# Patient Record
Sex: MALE | Race: WHITE | Employment: OTHER | URBAN - NONMETROPOLITAN AREA
[De-identification: names, ages, dates, MRNs, and addresses within clinical notes are randomized per-mention and may not be internally consistent; named-entity substitution may affect disease eponyms.]

---

## 2017-01-12 ENCOUNTER — OFFICE VISIT (OUTPATIENT)
Dept: FAMILY MEDICINE CLINIC | Age: 67
End: 2017-01-12

## 2017-01-12 VITALS
SYSTOLIC BLOOD PRESSURE: 134 MMHG | HEART RATE: 68 BPM | WEIGHT: 250 LBS | OXYGEN SATURATION: 95 % | DIASTOLIC BLOOD PRESSURE: 86 MMHG | BODY MASS INDEX: 35.79 KG/M2 | HEIGHT: 70 IN

## 2017-01-12 DIAGNOSIS — G47.33 OSA (OBSTRUCTIVE SLEEP APNEA): ICD-10-CM

## 2017-01-12 DIAGNOSIS — I10 ESSENTIAL HYPERTENSION: ICD-10-CM

## 2017-01-12 DIAGNOSIS — I25.10 ASHD (ARTERIOSCLEROTIC HEART DISEASE): ICD-10-CM

## 2017-01-12 DIAGNOSIS — E78.5 HYPERLIPIDEMIA WITH TARGET LDL LESS THAN 70: ICD-10-CM

## 2017-01-12 DIAGNOSIS — M1A.9XX0 CHRONIC GOUT WITHOUT TOPHUS, UNSPECIFIED CAUSE, UNSPECIFIED SITE: ICD-10-CM

## 2017-01-12 DIAGNOSIS — I48.20 CHRONIC ATRIAL FIBRILLATION (HCC): ICD-10-CM

## 2017-01-12 LAB
INR BLD: 3.06 (ref 0.85–1.16)
PROTHROMBIN TIME: 35.7 SEC (ref 9.8–13)

## 2017-01-12 PROCEDURE — 36415 COLL VENOUS BLD VENIPUNCTURE: CPT | Performed by: FAMILY MEDICINE

## 2017-01-12 PROCEDURE — 99214 OFFICE O/P EST MOD 30 MIN: CPT | Performed by: FAMILY MEDICINE

## 2017-01-12 RX ORDER — FOLIC ACID 1 MG/1
TABLET ORAL
Qty: 30 TABLET | Refills: 5 | Status: SHIPPED | OUTPATIENT
Start: 2017-01-12 | End: 2017-06-16 | Stop reason: SDUPTHER

## 2017-01-12 RX ORDER — BUSPIRONE HYDROCHLORIDE 15 MG/1
TABLET ORAL
Qty: 40 TABLET | Refills: 5 | Status: SHIPPED | OUTPATIENT
Start: 2017-01-12 | End: 2017-06-16 | Stop reason: SDUPTHER

## 2017-01-12 RX ORDER — ROSUVASTATIN CALCIUM 10 MG/1
TABLET, COATED ORAL
Qty: 30 TABLET | Refills: 5 | Status: SHIPPED | OUTPATIENT
Start: 2017-01-12 | End: 2017-06-16 | Stop reason: SDUPTHER

## 2017-01-12 RX ORDER — LISINOPRIL 40 MG/1
40 TABLET ORAL DAILY
Qty: 30 TABLET | Refills: 5 | Status: SHIPPED | OUTPATIENT
Start: 2017-01-12 | End: 2017-06-16 | Stop reason: SDUPTHER

## 2017-01-12 RX ORDER — CLONAZEPAM 2 MG/1
TABLET ORAL
Qty: 30 TABLET | Refills: 5 | Status: SHIPPED | OUTPATIENT
Start: 2017-01-12 | End: 2017-06-16 | Stop reason: SDUPTHER

## 2017-01-12 RX ORDER — EZETIMIBE 10 MG/1
TABLET ORAL
Qty: 30 TABLET | Refills: 5 | Status: SHIPPED | OUTPATIENT
Start: 2017-01-12 | End: 2017-01-23

## 2017-01-12 RX ORDER — METOPROLOL TARTRATE 100 MG/1
100 TABLET ORAL 2 TIMES DAILY
Qty: 60 TABLET | Refills: 5 | Status: SHIPPED | OUTPATIENT
Start: 2017-01-12 | End: 2017-06-16 | Stop reason: SDUPTHER

## 2017-01-12 RX ORDER — LAMOTRIGINE 100 MG/1
TABLET ORAL
Qty: 30 TABLET | Refills: 5 | Status: SHIPPED | OUTPATIENT
Start: 2017-01-12 | End: 2017-06-16 | Stop reason: SDUPTHER

## 2017-01-12 ASSESSMENT — ENCOUNTER SYMPTOMS
APNEA: 0
SHORTNESS OF BREATH: 0
ANAL BLEEDING: 0
BLOOD IN STOOL: 0

## 2017-01-20 ENCOUNTER — TELEPHONE (OUTPATIENT)
Dept: FAMILY MEDICINE CLINIC | Age: 67
End: 2017-01-20

## 2017-02-19 DIAGNOSIS — M1A.9XX0 CHRONIC GOUT WITHOUT TOPHUS, UNSPECIFIED CAUSE, UNSPECIFIED SITE: ICD-10-CM

## 2017-02-20 RX ORDER — ALLOPURINOL 300 MG/1
TABLET ORAL
Qty: 30 TABLET | Refills: 5 | Status: SHIPPED | OUTPATIENT
Start: 2017-02-20 | End: 2017-06-16 | Stop reason: SDUPTHER

## 2017-02-27 ENCOUNTER — OFFICE VISIT (OUTPATIENT)
Dept: DERMATOLOGY | Age: 67
End: 2017-02-27

## 2017-02-27 DIAGNOSIS — L85.3 XEROSIS CUTIS: ICD-10-CM

## 2017-02-27 DIAGNOSIS — Z12.83 SCREENING EXAM FOR SKIN CANCER: ICD-10-CM

## 2017-02-27 DIAGNOSIS — L57.0 ACTINIC KERATOSES: Primary | ICD-10-CM

## 2017-02-27 PROCEDURE — 99203 OFFICE O/P NEW LOW 30 MIN: CPT | Performed by: DERMATOLOGY

## 2017-02-27 PROCEDURE — G8598 ASA/ANTIPLAT THER USED: HCPCS | Performed by: DERMATOLOGY

## 2017-02-27 PROCEDURE — 3017F COLORECTAL CA SCREEN DOC REV: CPT | Performed by: DERMATOLOGY

## 2017-02-27 PROCEDURE — G8417 CALC BMI ABV UP PARAM F/U: HCPCS | Performed by: DERMATOLOGY

## 2017-02-27 PROCEDURE — 4004F PT TOBACCO SCREEN RCVD TLK: CPT | Performed by: DERMATOLOGY

## 2017-02-27 PROCEDURE — G8427 DOCREV CUR MEDS BY ELIG CLIN: HCPCS | Performed by: DERMATOLOGY

## 2017-02-27 PROCEDURE — 1123F ACP DISCUSS/DSCN MKR DOCD: CPT | Performed by: DERMATOLOGY

## 2017-02-27 PROCEDURE — G8484 FLU IMMUNIZE NO ADMIN: HCPCS | Performed by: DERMATOLOGY

## 2017-02-27 PROCEDURE — 4040F PNEUMOC VAC/ADMIN/RCVD: CPT | Performed by: DERMATOLOGY

## 2017-02-27 PROCEDURE — 17000 DESTRUCT PREMALG LESION: CPT | Performed by: DERMATOLOGY

## 2017-02-27 PROCEDURE — 17003 DESTRUCT PREMALG LES 2-14: CPT | Performed by: DERMATOLOGY

## 2017-02-27 RX ORDER — EZETIMIBE 10 MG/1
10 TABLET ORAL DAILY
COMMUNITY
End: 2017-06-16 | Stop reason: SDUPTHER

## 2017-06-16 ENCOUNTER — OFFICE VISIT (OUTPATIENT)
Dept: FAMILY MEDICINE CLINIC | Age: 67
End: 2017-06-16

## 2017-06-16 VITALS
SYSTOLIC BLOOD PRESSURE: 118 MMHG | DIASTOLIC BLOOD PRESSURE: 66 MMHG | OXYGEN SATURATION: 97 % | HEART RATE: 71 BPM | WEIGHT: 250 LBS | HEIGHT: 70 IN | BODY MASS INDEX: 35.79 KG/M2

## 2017-06-16 DIAGNOSIS — E78.5 HYPERLIPIDEMIA WITH TARGET LDL LESS THAN 70: ICD-10-CM

## 2017-06-16 DIAGNOSIS — F41.9 ANXIETY: ICD-10-CM

## 2017-06-16 DIAGNOSIS — I10 ESSENTIAL HYPERTENSION: Primary | ICD-10-CM

## 2017-06-16 DIAGNOSIS — G40.909 SEIZURE DISORDER (HCC): ICD-10-CM

## 2017-06-16 DIAGNOSIS — M1A.9XX0 CHRONIC GOUT WITHOUT TOPHUS, UNSPECIFIED CAUSE, UNSPECIFIED SITE: ICD-10-CM

## 2017-06-16 LAB
A/G RATIO: 1.3 (ref 1.1–2.2)
ALBUMIN SERPL-MCNC: 4.2 G/DL (ref 3.4–5)
ALP BLD-CCNC: 84 U/L (ref 40–129)
ALT SERPL-CCNC: 23 U/L (ref 10–40)
ANION GAP SERPL CALCULATED.3IONS-SCNC: 13 MMOL/L (ref 3–16)
AST SERPL-CCNC: 40 U/L (ref 15–37)
BILIRUB SERPL-MCNC: 1.1 MG/DL (ref 0–1)
BUN BLDV-MCNC: 13 MG/DL (ref 7–20)
CALCIUM SERPL-MCNC: 9.5 MG/DL (ref 8.3–10.6)
CHLORIDE BLD-SCNC: 99 MMOL/L (ref 99–110)
CHOLESTEROL, TOTAL: 128 MG/DL (ref 0–199)
CO2: 29 MMOL/L (ref 21–32)
CREAT SERPL-MCNC: 1 MG/DL (ref 0.8–1.3)
GFR AFRICAN AMERICAN: >60
GFR NON-AFRICAN AMERICAN: >60
GLOBULIN: 3.3 G/DL
GLUCOSE BLD-MCNC: 133 MG/DL (ref 70–99)
HCT VFR BLD CALC: 46.1 % (ref 40.5–52.5)
HDLC SERPL-MCNC: 54 MG/DL (ref 40–60)
HEMOGLOBIN: 14.9 G/DL (ref 13.5–17.5)
LDL CHOLESTEROL CALCULATED: 56 MG/DL
MCH RBC QN AUTO: 30.5 PG (ref 26–34)
MCHC RBC AUTO-ENTMCNC: 32.3 G/DL (ref 31–36)
MCV RBC AUTO: 94.3 FL (ref 80–100)
PDW BLD-RTO: 14.8 % (ref 12.4–15.4)
PLATELET # BLD: 129 K/UL (ref 135–450)
PMV BLD AUTO: 9 FL (ref 5–10.5)
POTASSIUM SERPL-SCNC: 4.2 MMOL/L (ref 3.5–5.1)
RBC # BLD: 4.89 M/UL (ref 4.2–5.9)
SODIUM BLD-SCNC: 141 MMOL/L (ref 136–145)
TOTAL PROTEIN: 7.5 G/DL (ref 6.4–8.2)
TRIGL SERPL-MCNC: 88 MG/DL (ref 0–150)
URIC ACID, SERUM: 3.4 MG/DL (ref 3.5–7.2)
VLDLC SERPL CALC-MCNC: 18 MG/DL
WBC # BLD: 5 K/UL (ref 4–11)

## 2017-06-16 PROCEDURE — 3017F COLORECTAL CA SCREEN DOC REV: CPT | Performed by: FAMILY MEDICINE

## 2017-06-16 PROCEDURE — G8417 CALC BMI ABV UP PARAM F/U: HCPCS | Performed by: FAMILY MEDICINE

## 2017-06-16 PROCEDURE — 4040F PNEUMOC VAC/ADMIN/RCVD: CPT | Performed by: FAMILY MEDICINE

## 2017-06-16 PROCEDURE — 4004F PT TOBACCO SCREEN RCVD TLK: CPT | Performed by: FAMILY MEDICINE

## 2017-06-16 PROCEDURE — 99214 OFFICE O/P EST MOD 30 MIN: CPT | Performed by: FAMILY MEDICINE

## 2017-06-16 PROCEDURE — G8427 DOCREV CUR MEDS BY ELIG CLIN: HCPCS | Performed by: FAMILY MEDICINE

## 2017-06-16 PROCEDURE — 36415 COLL VENOUS BLD VENIPUNCTURE: CPT | Performed by: FAMILY MEDICINE

## 2017-06-16 PROCEDURE — 1123F ACP DISCUSS/DSCN MKR DOCD: CPT | Performed by: FAMILY MEDICINE

## 2017-06-16 PROCEDURE — G8598 ASA/ANTIPLAT THER USED: HCPCS | Performed by: FAMILY MEDICINE

## 2017-06-16 RX ORDER — ALLOPURINOL 300 MG/1
TABLET ORAL
Qty: 30 TABLET | Refills: 5 | Status: SHIPPED | OUTPATIENT
Start: 2017-06-16 | End: 2018-10-04 | Stop reason: SDUPTHER

## 2017-06-16 RX ORDER — LAMOTRIGINE 100 MG/1
TABLET ORAL
Qty: 30 TABLET | Refills: 5 | Status: SHIPPED | OUTPATIENT
Start: 2017-06-16 | End: 2018-01-15 | Stop reason: ALTCHOICE

## 2017-06-16 RX ORDER — ROSUVASTATIN CALCIUM 10 MG/1
TABLET, COATED ORAL
Qty: 30 TABLET | Refills: 5 | Status: SHIPPED | OUTPATIENT
Start: 2017-06-16 | End: 2018-03-19 | Stop reason: SDUPTHER

## 2017-06-16 RX ORDER — FOLIC ACID 1 MG/1
TABLET ORAL
Qty: 30 TABLET | Refills: 5 | Status: SHIPPED | OUTPATIENT
Start: 2017-06-16 | End: 2018-01-17 | Stop reason: SDUPTHER

## 2017-06-16 RX ORDER — METOPROLOL TARTRATE 100 MG/1
100 TABLET ORAL 2 TIMES DAILY
Qty: 60 TABLET | Refills: 5 | Status: SHIPPED | OUTPATIENT
Start: 2017-06-16 | End: 2018-01-23 | Stop reason: SDUPTHER

## 2017-06-16 RX ORDER — CLONAZEPAM 2 MG/1
TABLET ORAL
Qty: 30 TABLET | Refills: 5 | Status: SHIPPED | OUTPATIENT
Start: 2017-06-16 | End: 2017-12-04 | Stop reason: SDUPTHER

## 2017-06-16 RX ORDER — LISINOPRIL 40 MG/1
40 TABLET ORAL DAILY
Qty: 30 TABLET | Refills: 5 | Status: SHIPPED | OUTPATIENT
Start: 2017-06-16 | End: 2017-12-21

## 2017-06-16 RX ORDER — EZETIMIBE 10 MG/1
10 TABLET ORAL DAILY
Qty: 30 TABLET | Refills: 5 | Status: SHIPPED | OUTPATIENT
Start: 2017-06-16 | End: 2018-02-03 | Stop reason: SDUPTHER

## 2017-06-16 RX ORDER — BUSPIRONE HYDROCHLORIDE 15 MG/1
TABLET ORAL
Qty: 40 TABLET | Refills: 5 | Status: SHIPPED | OUTPATIENT
Start: 2017-06-16 | End: 2017-12-21 | Stop reason: SDUPTHER

## 2017-06-16 ASSESSMENT — ENCOUNTER SYMPTOMS
BLOOD IN STOOL: 0
ANAL BLEEDING: 0

## 2017-08-28 ENCOUNTER — OFFICE VISIT (OUTPATIENT)
Dept: FAMILY MEDICINE CLINIC | Age: 67
End: 2017-08-28

## 2017-08-28 VITALS
HEART RATE: 90 BPM | HEIGHT: 70 IN | SYSTOLIC BLOOD PRESSURE: 110 MMHG | OXYGEN SATURATION: 97 % | DIASTOLIC BLOOD PRESSURE: 70 MMHG | BODY MASS INDEX: 35.93 KG/M2 | WEIGHT: 251 LBS

## 2017-08-28 DIAGNOSIS — K92.2 GASTROINTESTINAL HEMORRHAGE, UNSPECIFIED GASTROINTESTINAL HEMORRHAGE TYPE: ICD-10-CM

## 2017-08-28 DIAGNOSIS — I95.1 ORTHOSTASIS: Primary | ICD-10-CM

## 2017-08-28 LAB
A/G RATIO: 1.3 (ref 1.1–2.2)
ALBUMIN SERPL-MCNC: 4 G/DL (ref 3.4–5)
ALP BLD-CCNC: 85 U/L (ref 40–129)
ALT SERPL-CCNC: 22 U/L (ref 10–40)
ANION GAP SERPL CALCULATED.3IONS-SCNC: 10 MMOL/L (ref 3–16)
AST SERPL-CCNC: 39 U/L (ref 15–37)
BASOPHILS ABSOLUTE: 0 K/UL (ref 0–0.2)
BASOPHILS RELATIVE PERCENT: 0.7 %
BILIRUB SERPL-MCNC: 0.6 MG/DL (ref 0–1)
BUN BLDV-MCNC: 26 MG/DL (ref 7–20)
CALCIUM SERPL-MCNC: 9 MG/DL (ref 8.3–10.6)
CHLORIDE BLD-SCNC: 102 MMOL/L (ref 99–110)
CO2: 27 MMOL/L (ref 21–32)
CREAT SERPL-MCNC: 1.3 MG/DL (ref 0.8–1.3)
EOSINOPHILS ABSOLUTE: 0.2 K/UL (ref 0–0.6)
EOSINOPHILS RELATIVE PERCENT: 3.4 %
GFR AFRICAN AMERICAN: >60
GFR NON-AFRICAN AMERICAN: 55
GLOBULIN: 3.2 G/DL
GLUCOSE BLD-MCNC: 93 MG/DL (ref 70–99)
HCT VFR BLD CALC: 34.9 % (ref 40.5–52.5)
HEMOGLOBIN: 11.7 G/DL (ref 13.5–17.5)
INR BLD: 2.61 (ref 0.85–1.15)
LYMPHOCYTES ABSOLUTE: 1.8 K/UL (ref 1–5.1)
LYMPHOCYTES RELATIVE PERCENT: 28 %
MCH RBC QN AUTO: 31.3 PG (ref 26–34)
MCHC RBC AUTO-ENTMCNC: 33.6 G/DL (ref 31–36)
MCV RBC AUTO: 93.3 FL (ref 80–100)
MONOCYTES ABSOLUTE: 1.1 K/UL (ref 0–1.3)
MONOCYTES RELATIVE PERCENT: 16.7 %
NEUTROPHILS ABSOLUTE: 3.3 K/UL (ref 1.7–7.7)
NEUTROPHILS RELATIVE PERCENT: 51.2 %
PDW BLD-RTO: 16.2 % (ref 12.4–15.4)
PLATELET # BLD: 148 K/UL (ref 135–450)
PMV BLD AUTO: 9.9 FL (ref 5–10.5)
POTASSIUM SERPL-SCNC: 5.3 MMOL/L (ref 3.5–5.1)
PROTHROMBIN TIME: 29.5 SEC (ref 9.6–13)
RBC # BLD: 3.75 M/UL (ref 4.2–5.9)
SODIUM BLD-SCNC: 139 MMOL/L (ref 136–145)
TOTAL PROTEIN: 7.2 G/DL (ref 6.4–8.2)
WBC # BLD: 6.4 K/UL (ref 4–11)

## 2017-08-28 PROCEDURE — 3017F COLORECTAL CA SCREEN DOC REV: CPT | Performed by: FAMILY MEDICINE

## 2017-08-28 PROCEDURE — 99215 OFFICE O/P EST HI 40 MIN: CPT | Performed by: FAMILY MEDICINE

## 2017-08-28 PROCEDURE — G8417 CALC BMI ABV UP PARAM F/U: HCPCS | Performed by: FAMILY MEDICINE

## 2017-08-28 PROCEDURE — 36415 COLL VENOUS BLD VENIPUNCTURE: CPT | Performed by: FAMILY MEDICINE

## 2017-08-28 PROCEDURE — G8598 ASA/ANTIPLAT THER USED: HCPCS | Performed by: FAMILY MEDICINE

## 2017-08-28 PROCEDURE — 4040F PNEUMOC VAC/ADMIN/RCVD: CPT | Performed by: FAMILY MEDICINE

## 2017-08-28 PROCEDURE — 1123F ACP DISCUSS/DSCN MKR DOCD: CPT | Performed by: FAMILY MEDICINE

## 2017-08-28 PROCEDURE — 4004F PT TOBACCO SCREEN RCVD TLK: CPT | Performed by: FAMILY MEDICINE

## 2017-08-28 PROCEDURE — G8427 DOCREV CUR MEDS BY ELIG CLIN: HCPCS | Performed by: FAMILY MEDICINE

## 2017-08-28 ASSESSMENT — ENCOUNTER SYMPTOMS
CONSTIPATION: 0
DIARRHEA: 1
RESPIRATORY NEGATIVE: 1
BLOOD IN STOOL: 1
VOMITING: 0

## 2017-08-29 ENCOUNTER — TELEPHONE (OUTPATIENT)
Dept: FAMILY MEDICINE CLINIC | Age: 67
End: 2017-08-29

## 2017-08-29 DIAGNOSIS — K92.2 ACUTE GI BLEEDING: Primary | ICD-10-CM

## 2017-08-31 ENCOUNTER — OFFICE VISIT (OUTPATIENT)
Dept: FAMILY MEDICINE CLINIC | Age: 67
End: 2017-08-31

## 2017-08-31 DIAGNOSIS — I48.20 CHRONIC ATRIAL FIBRILLATION (HCC): ICD-10-CM

## 2017-08-31 DIAGNOSIS — K92.2 ACUTE GI BLEEDING: Primary | ICD-10-CM

## 2017-08-31 DIAGNOSIS — I10 ESSENTIAL HYPERTENSION: ICD-10-CM

## 2017-08-31 PROCEDURE — G8417 CALC BMI ABV UP PARAM F/U: HCPCS | Performed by: FAMILY MEDICINE

## 2017-08-31 PROCEDURE — 1123F ACP DISCUSS/DSCN MKR DOCD: CPT | Performed by: FAMILY MEDICINE

## 2017-08-31 PROCEDURE — 4040F PNEUMOC VAC/ADMIN/RCVD: CPT | Performed by: FAMILY MEDICINE

## 2017-08-31 PROCEDURE — G8598 ASA/ANTIPLAT THER USED: HCPCS | Performed by: FAMILY MEDICINE

## 2017-08-31 PROCEDURE — 99214 OFFICE O/P EST MOD 30 MIN: CPT | Performed by: FAMILY MEDICINE

## 2017-08-31 PROCEDURE — 3017F COLORECTAL CA SCREEN DOC REV: CPT | Performed by: FAMILY MEDICINE

## 2017-08-31 PROCEDURE — 4004F PT TOBACCO SCREEN RCVD TLK: CPT | Performed by: FAMILY MEDICINE

## 2017-08-31 PROCEDURE — G8427 DOCREV CUR MEDS BY ELIG CLIN: HCPCS | Performed by: FAMILY MEDICINE

## 2017-09-01 ENCOUNTER — TELEPHONE (OUTPATIENT)
Dept: FAMILY MEDICINE CLINIC | Age: 67
End: 2017-09-01

## 2017-09-01 VITALS
WEIGHT: 247 LBS | SYSTOLIC BLOOD PRESSURE: 156 MMHG | HEIGHT: 70 IN | HEART RATE: 109 BPM | OXYGEN SATURATION: 97 % | DIASTOLIC BLOOD PRESSURE: 70 MMHG | BODY MASS INDEX: 35.36 KG/M2

## 2017-09-01 ASSESSMENT — ENCOUNTER SYMPTOMS
RESPIRATORY NEGATIVE: 1
BLOOD IN STOOL: 0
ANAL BLEEDING: 0

## 2017-09-06 ENCOUNTER — TELEPHONE (OUTPATIENT)
Dept: FAMILY MEDICINE CLINIC | Age: 67
End: 2017-09-06

## 2017-09-13 ENCOUNTER — OFFICE VISIT (OUTPATIENT)
Dept: FAMILY MEDICINE CLINIC | Age: 67
End: 2017-09-13

## 2017-09-13 VITALS
TEMPERATURE: 99 F | DIASTOLIC BLOOD PRESSURE: 86 MMHG | WEIGHT: 249 LBS | HEART RATE: 82 BPM | SYSTOLIC BLOOD PRESSURE: 126 MMHG | OXYGEN SATURATION: 96 % | HEIGHT: 70 IN | BODY MASS INDEX: 35.65 KG/M2

## 2017-09-13 DIAGNOSIS — D50.0 IRON DEFICIENCY ANEMIA DUE TO CHRONIC BLOOD LOSS: Primary | ICD-10-CM

## 2017-09-13 DIAGNOSIS — Z23 NEED FOR INFLUENZA VACCINATION: ICD-10-CM

## 2017-09-13 LAB
HCT VFR BLD CALC: 34.2 % (ref 40.5–52.5)
HEMOGLOBIN: 11.2 G/DL (ref 13.5–17.5)
MCH RBC QN AUTO: 29.8 PG (ref 26–34)
MCHC RBC AUTO-ENTMCNC: 32.9 G/DL (ref 31–36)
MCV RBC AUTO: 90.6 FL (ref 80–100)
PDW BLD-RTO: 15.7 % (ref 12.4–15.4)
PLATELET # BLD: 132 K/UL (ref 135–450)
PMV BLD AUTO: 8.8 FL (ref 5–10.5)
RBC # BLD: 3.78 M/UL (ref 4.2–5.9)
WBC # BLD: 5.9 K/UL (ref 4–11)

## 2017-09-13 PROCEDURE — 4040F PNEUMOC VAC/ADMIN/RCVD: CPT | Performed by: FAMILY MEDICINE

## 2017-09-13 PROCEDURE — 99214 OFFICE O/P EST MOD 30 MIN: CPT | Performed by: FAMILY MEDICINE

## 2017-09-13 PROCEDURE — 3017F COLORECTAL CA SCREEN DOC REV: CPT | Performed by: FAMILY MEDICINE

## 2017-09-13 PROCEDURE — 1123F ACP DISCUSS/DSCN MKR DOCD: CPT | Performed by: FAMILY MEDICINE

## 2017-09-13 PROCEDURE — G0008 ADMIN INFLUENZA VIRUS VAC: HCPCS | Performed by: FAMILY MEDICINE

## 2017-09-13 PROCEDURE — G8598 ASA/ANTIPLAT THER USED: HCPCS | Performed by: FAMILY MEDICINE

## 2017-09-13 PROCEDURE — 36415 COLL VENOUS BLD VENIPUNCTURE: CPT | Performed by: FAMILY MEDICINE

## 2017-09-13 PROCEDURE — G8427 DOCREV CUR MEDS BY ELIG CLIN: HCPCS | Performed by: FAMILY MEDICINE

## 2017-09-13 PROCEDURE — 4004F PT TOBACCO SCREEN RCVD TLK: CPT | Performed by: FAMILY MEDICINE

## 2017-09-13 PROCEDURE — 90688 IIV4 VACCINE SPLT 0.5 ML IM: CPT | Performed by: FAMILY MEDICINE

## 2017-09-13 PROCEDURE — G8417 CALC BMI ABV UP PARAM F/U: HCPCS | Performed by: FAMILY MEDICINE

## 2017-09-13 RX ORDER — DABIGATRAN ETEXILATE 150 MG/1
150 CAPSULE, COATED PELLETS ORAL 2 TIMES DAILY
COMMUNITY
End: 2017-10-18 | Stop reason: SINTOL

## 2017-09-13 ASSESSMENT — ENCOUNTER SYMPTOMS
BLOOD IN STOOL: 0
SHORTNESS OF BREATH: 0
ANAL BLEEDING: 0

## 2017-10-18 ENCOUNTER — OFFICE VISIT (OUTPATIENT)
Dept: FAMILY MEDICINE CLINIC | Age: 67
End: 2017-10-18

## 2017-10-18 VITALS
HEART RATE: 84 BPM | SYSTOLIC BLOOD PRESSURE: 122 MMHG | BODY MASS INDEX: 36.36 KG/M2 | HEIGHT: 70 IN | DIASTOLIC BLOOD PRESSURE: 78 MMHG | OXYGEN SATURATION: 97 % | WEIGHT: 254 LBS

## 2017-10-18 DIAGNOSIS — D50.8 OTHER IRON DEFICIENCY ANEMIA: Primary | ICD-10-CM

## 2017-10-18 LAB
HCT VFR BLD CALC: 34.8 % (ref 40.5–52.5)
HEMOGLOBIN: 11.3 G/DL (ref 13.5–17.5)
MCH RBC QN AUTO: 27.9 PG (ref 26–34)
MCHC RBC AUTO-ENTMCNC: 32.3 G/DL (ref 31–36)
MCV RBC AUTO: 86.4 FL (ref 80–100)
PDW BLD-RTO: 18.2 % (ref 12.4–15.4)
PLATELET # BLD: 114 K/UL (ref 135–450)
PMV BLD AUTO: 9.2 FL (ref 5–10.5)
RBC # BLD: 4.03 M/UL (ref 4.2–5.9)
WBC # BLD: 4.9 K/UL (ref 4–11)

## 2017-10-18 PROCEDURE — 1123F ACP DISCUSS/DSCN MKR DOCD: CPT | Performed by: FAMILY MEDICINE

## 2017-10-18 PROCEDURE — 4040F PNEUMOC VAC/ADMIN/RCVD: CPT | Performed by: FAMILY MEDICINE

## 2017-10-18 PROCEDURE — G8427 DOCREV CUR MEDS BY ELIG CLIN: HCPCS | Performed by: FAMILY MEDICINE

## 2017-10-18 PROCEDURE — 36415 COLL VENOUS BLD VENIPUNCTURE: CPT | Performed by: FAMILY MEDICINE

## 2017-10-18 PROCEDURE — 99213 OFFICE O/P EST LOW 20 MIN: CPT | Performed by: FAMILY MEDICINE

## 2017-10-18 PROCEDURE — G8417 CALC BMI ABV UP PARAM F/U: HCPCS | Performed by: FAMILY MEDICINE

## 2017-10-18 PROCEDURE — 3017F COLORECTAL CA SCREEN DOC REV: CPT | Performed by: FAMILY MEDICINE

## 2017-10-18 PROCEDURE — G8484 FLU IMMUNIZE NO ADMIN: HCPCS | Performed by: FAMILY MEDICINE

## 2017-10-18 PROCEDURE — 4004F PT TOBACCO SCREEN RCVD TLK: CPT | Performed by: FAMILY MEDICINE

## 2017-10-18 PROCEDURE — G8598 ASA/ANTIPLAT THER USED: HCPCS | Performed by: FAMILY MEDICINE

## 2017-10-18 RX ORDER — ALBUTEROL SULFATE 90 UG/1
2 AEROSOL, METERED RESPIRATORY (INHALATION) EVERY 6 HOURS PRN
COMMUNITY
End: 2017-10-18 | Stop reason: SDUPTHER

## 2017-10-18 RX ORDER — ALBUTEROL SULFATE 90 UG/1
2 AEROSOL, METERED RESPIRATORY (INHALATION) EVERY 6 HOURS PRN
Qty: 1 INHALER | Refills: 5 | Status: SHIPPED | OUTPATIENT
Start: 2017-10-18 | End: 2018-11-23 | Stop reason: SDUPTHER

## 2017-10-18 NOTE — PROGRESS NOTES
Subjective:      Patient ID: Karlene Mixon is a 79 y.o. male. HPI  Chief Complaint   Patient presents with    Anemia     1 mo fu     Dizziness     states he has been having these again they started about 4 days ago ;less lightheadedness;    Shortness of Breath     whenever he does anything physical this happens ;spouse feels he is less sob; Chief complaint present illness: Patient presents in follow-up regarding anemia. On no iron. No black stool. Wife is here with him and corroborates. Not exactly clear to this examiner whether he is any worse or any better or whether he is return to baseline status before his GI bleed. Cardiologist is said not a candidate currently for watchman procedure. This examiner not completely in agreement with this given the recent rather large bleed. Review of Systems  neg  Objective:   Physical Exam  /78   Pulse 84   Ht 5' 10\" (1.778 m)   Wt 254 lb (115.2 kg)   SpO2 97%   BMI 36.45 kg/m²   Skin: No pallor  Assessment:      Opal Phillips was seen today for anemia, dizziness and shortness of breath. Diagnoses and all orders for this visit:    Other iron deficiency anemia  -     CBC    Other orders  -     albuterol sulfate HFA (PROAIR HFA) 108 (90 Base) MCG/ACT inhaler; Inhale 2 puffs into the lungs every 6 hours as needed for Wheezing           Plan:      No Follow-up on file. Patient Instructions   Will call with blood count. may need iron.

## 2017-10-19 DIAGNOSIS — N91.2 AMENIA: Primary | ICD-10-CM

## 2017-10-25 ENCOUNTER — NURSE ONLY (OUTPATIENT)
Dept: FAMILY MEDICINE CLINIC | Age: 67
End: 2017-10-25

## 2017-10-25 DIAGNOSIS — N91.2 AMENIA: ICD-10-CM

## 2017-10-25 LAB
FOLATE: >20 NG/ML (ref 4.78–24.2)
IRON SATURATION: 7 % (ref 20–50)
IRON: 31 UG/DL (ref 59–158)
TOTAL IRON BINDING CAPACITY: 414 UG/DL (ref 260–445)
VITAMIN B-12: 250 PG/ML (ref 211–911)

## 2017-10-25 PROCEDURE — 36415 COLL VENOUS BLD VENIPUNCTURE: CPT | Performed by: FAMILY MEDICINE

## 2017-10-25 RX ORDER — FERROUS SULFATE 325(65) MG
325 TABLET ORAL 2 TIMES DAILY
Qty: 60 TABLET | Refills: 2 | Status: SHIPPED | OUTPATIENT
Start: 2017-10-25 | End: 2018-01-29 | Stop reason: SDUPTHER

## 2017-10-25 NOTE — PROGRESS NOTES
Lab preformed in R arm and sent number of tubes below to be processed. 1 attempt made and stopped bleeding by applying band aide and guaze.      2 Red    0 purple    0 blue    0 Urine

## 2017-10-26 LAB
KAPPA, FREE LIGHT CHAINS, SERUM: 42.22 MG/L (ref 3.3–19.4)
KAPPA/LAMBDA RATIO: 1.88 (ref 0.26–1.65)
KAPPA/LAMBDA TEST COMMENT: ABNORMAL
LAMBDA, FREE LIGHT CHAINS, SERUM: 22.48 MG/L (ref 5.71–26.3)

## 2017-10-27 LAB
ALBUMIN SERPL-MCNC: 3.5 G/DL (ref 3.1–4.9)
ALPHA-1-GLOBULIN: 0.2 G/DL (ref 0.2–0.4)
ALPHA-2-GLOBULIN: 0.7 G/DL (ref 0.4–1.1)
BETA GLOBULIN: 1.1 G/DL (ref 0.9–1.6)
GAMMA GLOBULIN: 1.5 G/DL (ref 0.6–1.8)
SPE/IFE INTERPRETATION: NORMAL
TOTAL PROTEIN: 6.9 G/DL (ref 6.4–8.2)

## 2017-10-31 DIAGNOSIS — D50.8 OTHER IRON DEFICIENCY ANEMIA: Primary | ICD-10-CM

## 2017-10-31 PROBLEM — E78.5 HYPERLIPIDEMIA: Status: ACTIVE | Noted: 2017-10-31

## 2017-10-31 PROBLEM — I48.92 ATRIAL FLUTTER (HCC): Status: ACTIVE | Noted: 2017-10-05

## 2017-10-31 PROBLEM — I25.10 CORONARY ARTERY DISEASE: Status: ACTIVE | Noted: 2017-10-31

## 2017-10-31 PROBLEM — K21.9 GERD (GASTROESOPHAGEAL REFLUX DISEASE): Status: ACTIVE | Noted: 2017-10-31

## 2017-10-31 PROBLEM — J44.9 COPD (CHRONIC OBSTRUCTIVE PULMONARY DISEASE) (HCC): Status: ACTIVE | Noted: 2017-10-31

## 2017-10-31 PROBLEM — Z95.1 S/P CABG (CORONARY ARTERY BYPASS GRAFT): Status: ACTIVE | Noted: 2017-10-05

## 2017-10-31 PROBLEM — Z72.0 TOBACCO ABUSE: Status: ACTIVE | Noted: 2017-10-31

## 2017-10-31 RX ORDER — LANOLIN ALCOHOL/MO/W.PET/CERES
1000 CREAM (GRAM) TOPICAL DAILY
Qty: 90 TABLET | Refills: 1
Start: 2017-10-31

## 2017-11-17 ENCOUNTER — HOSPITAL ENCOUNTER (OUTPATIENT)
Dept: OTHER | Age: 67
Discharge: HOME | End: 2017-11-17
Attending: FAMILY MEDICINE | Admitting: FAMILY MEDICINE

## 2017-11-17 ENCOUNTER — OFFICE VISIT (OUTPATIENT)
Dept: FAMILY MEDICINE CLINIC | Age: 67
End: 2017-11-17

## 2017-11-17 DIAGNOSIS — I50.9 ACUTE CONGESTIVE HEART FAILURE, UNSPECIFIED CONGESTIVE HEART FAILURE TYPE: ICD-10-CM

## 2017-11-17 DIAGNOSIS — R06.01 ORTHOPNEA: ICD-10-CM

## 2017-11-17 DIAGNOSIS — I50.9 ACUTE CONGESTIVE HEART FAILURE, UNSPECIFIED CONGESTIVE HEART FAILURE TYPE: Primary | ICD-10-CM

## 2017-11-17 DIAGNOSIS — D50.8 OTHER IRON DEFICIENCY ANEMIA: ICD-10-CM

## 2017-11-17 DIAGNOSIS — J43.8 OTHER EMPHYSEMA (HCC): ICD-10-CM

## 2017-11-17 LAB
ALBUMIN SERPL-MCNC: 3.9 G/DL (ref 3.4–5)
ANION GAP SERPL CALCULATED.3IONS-SCNC: 13 MMOL/L (ref 3–16)
BUN BLDV-MCNC: 12 MG/DL (ref 7–20)
CALCIUM SERPL-MCNC: 9 MG/DL (ref 8.3–10.6)
CHLORIDE BLD-SCNC: 101 MMOL/L (ref 99–110)
CO2: 30 MMOL/L (ref 21–32)
CREAT SERPL-MCNC: 1.1 MG/DL (ref 0.8–1.3)
GFR AFRICAN AMERICAN: >60
GFR NON-AFRICAN AMERICAN: >60
GLUCOSE BLD-MCNC: 109 MG/DL (ref 70–99)
HCT VFR BLD CALC: 38.4 % (ref 40.5–52.5)
HEMOGLOBIN: 12.3 G/DL (ref 13.5–17.5)
MCH RBC QN AUTO: 28.1 PG (ref 26–34)
MCHC RBC AUTO-ENTMCNC: 32 G/DL (ref 31–36)
MCV RBC AUTO: 87.8 FL (ref 80–100)
PDW BLD-RTO: 23.6 % (ref 12.4–15.4)
PHOSPHORUS: 3.6 MG/DL (ref 2.5–4.9)
PLATELET # BLD: 118 K/UL (ref 135–450)
PLATELET SLIDE REVIEW: ABNORMAL
PMV BLD AUTO: 7.9 FL (ref 5–10.5)
POTASSIUM SERPL-SCNC: 4.4 MMOL/L (ref 3.5–5.1)
PRO-BNP: 512 PG/ML (ref 0–124)
RBC # BLD: 4.37 M/UL (ref 4.2–5.9)
SLIDE REVIEW: ABNORMAL
SODIUM BLD-SCNC: 144 MMOL/L (ref 136–145)
WBC # BLD: 6.3 K/UL (ref 4–11)

## 2017-11-17 PROCEDURE — G8926 SPIRO NO PERF OR DOC: HCPCS | Performed by: FAMILY MEDICINE

## 2017-11-17 PROCEDURE — G8417 CALC BMI ABV UP PARAM F/U: HCPCS | Performed by: FAMILY MEDICINE

## 2017-11-17 PROCEDURE — 1123F ACP DISCUSS/DSCN MKR DOCD: CPT | Performed by: FAMILY MEDICINE

## 2017-11-17 PROCEDURE — 3017F COLORECTAL CA SCREEN DOC REV: CPT | Performed by: FAMILY MEDICINE

## 2017-11-17 PROCEDURE — 99215 OFFICE O/P EST HI 40 MIN: CPT | Performed by: FAMILY MEDICINE

## 2017-11-17 PROCEDURE — 4040F PNEUMOC VAC/ADMIN/RCVD: CPT | Performed by: FAMILY MEDICINE

## 2017-11-17 PROCEDURE — G8484 FLU IMMUNIZE NO ADMIN: HCPCS | Performed by: FAMILY MEDICINE

## 2017-11-17 PROCEDURE — 3023F SPIROM DOC REV: CPT | Performed by: FAMILY MEDICINE

## 2017-11-17 PROCEDURE — G8598 ASA/ANTIPLAT THER USED: HCPCS | Performed by: FAMILY MEDICINE

## 2017-11-17 PROCEDURE — G8427 DOCREV CUR MEDS BY ELIG CLIN: HCPCS | Performed by: FAMILY MEDICINE

## 2017-11-17 PROCEDURE — 4004F PT TOBACCO SCREEN RCVD TLK: CPT | Performed by: FAMILY MEDICINE

## 2017-11-17 RX ORDER — FUROSEMIDE 20 MG/1
20 TABLET ORAL DAILY
Qty: 10 TABLET | Refills: 0 | Status: SHIPPED | OUTPATIENT
Start: 2017-11-17 | End: 2017-11-17 | Stop reason: SDUPTHER

## 2017-11-17 RX ORDER — FUROSEMIDE 20 MG/1
20 TABLET ORAL DAILY
Qty: 10 TABLET | Refills: 0 | Status: ON HOLD | OUTPATIENT
Start: 2017-11-17 | End: 2017-11-21 | Stop reason: HOSPADM

## 2017-11-17 RX ORDER — FUROSEMIDE 20 MG/1
20 TABLET ORAL DAILY
Qty: 10 TABLET | Refills: 0 | Status: ON HOLD | OUTPATIENT
Start: 2017-11-17 | End: 2017-11-21

## 2017-11-17 RX ORDER — FUROSEMIDE 20 MG/1
20 TABLET ORAL DAILY
COMMUNITY
End: 2017-11-17 | Stop reason: SDUPTHER

## 2017-11-17 NOTE — PROGRESS NOTES
Subjective:      Patient ID: Jesús Kirkland is a 79 y.o. male. HPI  Chief Complaint   Patient presents with    Cough     going on since Monday X 4 days    Shortness of Breath    COPD     hisotry of copd    Anemia     on fe bid;     Chief complaint and present illness: 59-year-old white male with chronic atrial fib on a BX of an with sudden onset of iron deficiency anemia presumed secondary to an undocumented GI bleed presents with a 4-5 day history of difficulty sleeping because when he lies down he feels like he can't breathe, has a coughing spasm of nonproductively and has to sit up in a recliner in order to sleep. If he lies down again, he then gets short of breath. There is some suggestion that he has had this before but it is much worse than usual.  Patient is on lisinopril. Patient denies any chest pain. He is never aware of palpitations though he is in atrial fibrillation. He does complain of dyspnea on exertion but he says it's not new. Has not noticed much in the way of dependent edema. Denies reflux symptoms. Denies sinus congestion with postnasal drip. Review of Systems   Constitutional: Positive for activity change. Negative for fatigue and fever. HENT: Negative. Negative for nosebleeds. Respiratory: Positive for cough and shortness of breath. Cardiovascular: Negative for chest pain, palpitations and leg swelling. Positive paroxysmal nocturnal dyspnea   Gastrointestinal: Negative. Negative for abdominal pain, anal bleeding and blood in stool (stool is dark because he is on iron but it is not bloody he says). Continues to drink to excess   Endocrine: Negative for polydipsia and polyuria. Genitourinary: Negative for hematuria. Hematological: Bruises/bleeds easily. Objective:   Physical Exam   Constitutional: He appears well-developed and well-nourished. No distress. HENT:   Head: Normocephalic.    Mouth/Throat: Oropharynx is clear and moist.   Eyes: Conjunctivae are normal. No scleral icterus. Neck: Neck supple. Hepatojugular reflux and JVD present. Carotid bruit is not present. No thyromegaly present. Cardiovascular: An irregularly irregular rhythm present. Tachycardia present. rate is roughly 100 apically; fairly regular but atrial fib   Pulmonary/Chest: Effort normal. He has no wheezes. He has no rales. Diminished breath sounds in the right base   Abdominal: Soft. There is hepatosplenomegaly and hepatomegaly (liver down about 2 finger breaths). There is no tenderness. Musculoskeletal: He exhibits edema (1-2+ pretibial edema). Lymphadenopathy:     He has no cervical adenopathy. Skin: Skin is warm. No bruising and no ecchymosis noted. No erythema. No pallor. Nursing note and vitals reviewed. BP (!) 98/50   Pulse 71   Temp 97.6 °F (36.4 °C) (Oral)   Ht 5' 10\" (1.778 m)   Wt 254 lb 12.8 oz (115.6 kg)   SpO2 95%   BMI 36.56 kg/m²     Assessment:      Candice Paget was seen today for cough, shortness of breath, copd and anemia. Diagnoses and all orders for this visit:    Acute congestive heart failure, unspecified congestive heart failure type ([facility] [address]  -     BRAIN NATRIURETIC PEPTIDE (BNP); Future  -     XR CHEST STANDARD (2 VW); Future  -     Renal Function Panel; Future    Other iron deficiency anemia  -     CBC; Future    Orthopnea    Other orders  -     apixaban (ELIQUIS) 5 MG TABS tablet; Take 1 tablet by mouth 2 times daily Lot [id number]  EXPIRES 1/2020  -     Discontinue: furosemide (LASIX) 20 MG tablet; Take 1 tablet by mouth daily Once a day for 10 days. Make an appointment next week           Plan:      Patient Instructions   You may need a diuretic   Return if symptoms worsen or fail to improve. Chest x-ray and blood work obtained from [facility]. O Rabquestionable pulmonary edema, BNP slightly elevated in the 500s. Anemia actually better.   Patient and wife called instructions given re: Lasix and follow-up appointment

## 2017-11-19 VITALS
HEART RATE: 71 BPM | OXYGEN SATURATION: 95 % | SYSTOLIC BLOOD PRESSURE: 98 MMHG | TEMPERATURE: 97.6 F | BODY MASS INDEX: 36.48 KG/M2 | DIASTOLIC BLOOD PRESSURE: 50 MMHG | WEIGHT: 254.8 LBS | HEIGHT: 70 IN

## 2017-11-19 ASSESSMENT — ENCOUNTER SYMPTOMS
COUGH: 1
SHORTNESS OF BREATH: 1
BLOOD IN STOOL: 0
GASTROINTESTINAL NEGATIVE: 1
ABDOMINAL PAIN: 0
ANAL BLEEDING: 0

## 2017-11-20 PROBLEM — Z87.891 FORMER SMOKER: Chronic | Status: ACTIVE | Noted: 2017-10-31

## 2017-11-20 PROBLEM — I48.92 ATRIAL FLUTTER (HCC): Status: RESOLVED | Noted: 2017-10-05 | Resolved: 2017-11-20

## 2017-11-20 PROBLEM — I25.10 CORONARY ARTERY DISEASE: Status: RESOLVED | Noted: 2017-10-31 | Resolved: 2017-11-20

## 2017-11-20 PROBLEM — R79.89 ELEVATED TROPONIN: Status: ACTIVE | Noted: 2017-11-20

## 2017-11-20 PROBLEM — E66.9 OBESITY (BMI 30-39.9): Chronic | Status: ACTIVE | Noted: 2017-11-20

## 2017-11-20 PROBLEM — J18.9 LLL PNEUMONIA: Status: ACTIVE | Noted: 2017-11-20

## 2017-11-20 PROBLEM — J44.1 COPD EXACERBATION (HCC): Chronic | Status: ACTIVE | Noted: 2017-10-31

## 2017-11-20 PROBLEM — K21.9 GERD (GASTROESOPHAGEAL REFLUX DISEASE): Chronic | Status: ACTIVE | Noted: 2017-10-31

## 2017-11-20 PROBLEM — F10.20 ALCOHOLISM (HCC): Chronic | Status: ACTIVE | Noted: 2017-11-20

## 2017-11-20 PROBLEM — Z95.1 S/P CABG (CORONARY ARTERY BYPASS GRAFT): Status: RESOLVED | Noted: 2017-10-05 | Resolved: 2017-11-20

## 2017-11-20 PROBLEM — E87.20 LACTIC ACIDOSIS: Status: ACTIVE | Noted: 2017-11-20

## 2017-11-20 PROBLEM — E78.5 HLD (HYPERLIPIDEMIA): Chronic | Status: ACTIVE | Noted: 2017-10-31

## 2017-11-20 PROBLEM — E83.42 HYPOMAGNESEMIA: Status: ACTIVE | Noted: 2017-11-20

## 2017-11-20 PROBLEM — Z79.01 ANTICOAGULATED: Chronic | Status: ACTIVE | Noted: 2017-11-20

## 2017-11-20 PROBLEM — J96.01 ACUTE RESPIRATORY FAILURE WITH HYPOXEMIA (HCC): Status: ACTIVE | Noted: 2017-11-20

## 2017-11-20 PROBLEM — F10.929 ACUTE ALCOHOL INTOXICATION (HCC): Status: ACTIVE | Noted: 2017-11-20

## 2017-11-20 PROBLEM — R77.8 ELEVATED TROPONIN: Status: ACTIVE | Noted: 2017-11-20

## 2017-11-20 PROBLEM — J44.9 COPD (CHRONIC OBSTRUCTIVE PULMONARY DISEASE) (HCC): Chronic | Status: ACTIVE | Noted: 2017-10-31

## 2017-11-21 ENCOUNTER — TELEPHONE (OUTPATIENT)
Dept: PULMONOLOGY | Age: 67
End: 2017-11-21

## 2017-11-21 DIAGNOSIS — J44.9 CHRONIC OBSTRUCTIVE PULMONARY DISEASE, UNSPECIFIED COPD TYPE (HCC): Primary | ICD-10-CM

## 2017-11-21 DIAGNOSIS — R06.02 SOB (SHORTNESS OF BREATH): ICD-10-CM

## 2017-11-22 ENCOUNTER — TELEPHONE (OUTPATIENT)
Dept: PHARMACY | Facility: CLINIC | Age: 67
End: 2017-11-22

## 2017-11-22 ENCOUNTER — CARE COORDINATION (OUTPATIENT)
Dept: CASE MANAGEMENT | Age: 67
End: 2017-11-22

## 2017-11-22 RX ORDER — SOLIFENACIN SUCCINATE 10 MG/1
10 TABLET, FILM COATED ORAL DAILY
COMMUNITY
End: 2019-08-20 | Stop reason: ALTCHOICE

## 2017-11-22 NOTE — TELEPHONE ENCOUNTER
Spoke with patient states would like office to call wife called # provided #[phone] was wrong # will try calling patient back on another day.

## 2017-11-22 NOTE — CARE COORDINATION
[facility] Transitions Initial Follow Up Call    Call within 2 business days of discharge: Yes    Patient: Rosie Mixon Patient : 1950   MRN: [id number]  Reason for Admission: COPD, resp fail, CHF  Discharge Date: 17 RARS: [facility] Risk Score: 14.75     Spoke with: Isi Alanis (patient's wife)    Facility: [facility]    Non-face-to-face services provided:  Obtained and reviewed discharge summary and/or continuity of care documents  Education of patient/family/caregiver/guardian to support self-management-CHF ed  Assessment and support for treatment adherence and medication management-Duonebs      Care Transitions 24 Hour Call    Schedule Follow Up Appointment with PCP:  Completed  Do you have any ongoing symptoms?:  No  Do you have a copy of your discharge instructions?:  Yes  Do you have all of your prescriptions and are they filled?:  No (Comment: did not get Duonebs yet but has requested add'l documentation needed for rx to be paid by [facility] from hospitalist and plans to get today and start)  Have you been contacted by a [facility] Pharmacist?:  No  Have you scheduled your follow up appointment?:  Yes  How are you going to get to your appointment?:  Car - drive self (Comment: 17 with PCP)  Were you discharged with any Home Care or Post Acute Services:  No  Patient DME:  Straight cane  Patient Home Equipment:  CPAP (Comment: doesn't use)  Do you have support at home?:  Partner/Spouse/SO  Do you feel like you have everything you need to keep you well at home?:  Yes  Are you an active caregiver in your home?:  No  Care Transitions Interventions  No Identified Needs       Unable to reach patient by phone. Message left stating purpose of call with contact information requesting return call. Called and spoke with wife on her mobile, she was at work and very brief. Reports patient had good night sleep.  Unable to get duonebs because additional documentation from prescriber was needed for Medicare to pay, but after several calls this morning she was able to get that information and plans to  after work today and have him start right away. States he knows how to use the nebulizer. She called PCP office and they told her 12/4 for follow up was okay. Educated her on same day appts if patient is feeling unwell at any time prior to that appt. Declines HC again. Very brief on phone since she was at work. Agreeable to f/up next week.      Future Appointments  Date Time Provider Talia Berry   12/4/2017 8:15 AM MD MARJ Meza [facility]   3/7/2018 8:00 AM MD MARJ Meza [facility]   3/12/2018 9:15 AM Ryan Coulter MD And Derm TEVIN Ramirez RN

## 2017-11-22 NOTE — TELEPHONE ENCOUNTER
CLINICAL PHARMACY NOTE  Post-Discharge Transitions of Care (MAGDI)    Non-face-to-face services provided:  Assessment and support for treatment adherence and medication management-medication reconciliation    - There are no outstanding medication issues at this time. Subjective/Objective:  Mason Madison is a 79 y.o. male. Patient was discharged from [facility] on 11/21/17 with a diagnosis of respiratory failure. Patient outreach to review discharge medications and provide medication review and management. Spoke with patient    Allergies   Allergen Reactions    Penicillins Hives    Sulfa Antibiotics Hives    Tetanus Toxoids Hives       Discharge Medications (as per discharging medication list found): There are NEW medications for you. START taking them after you leave the hospital   ipratropium-albuterol (DUONEB) 0.5-2.5 (3) MG/3ML SOLN nebulizer solution  · Pt will  today - received call from [employer] that it is ready. Pt confirms that he was provided a neb machine prior to discharge. Inhale 3 mLs into the lungs every 4 hours      predniSONE (DELTASONE) 10 MG tablet  · Taking, no issues to report. 4 tabs for 3 days 3 tabs for 3 days 2 tabs for 3 days 1 tabs for 3 days      levofloxacin (LEVAQUIN) 500 MG tablet  · Taking, no issues to report. Dose appropriate. Take 1 tablet by mouth daily for 7 days     You told us you were taking these medications at home, but the amount or how often you take this medication has CHANGED   solifenacin (VESICARE) 10 MG tablet  · Pt taking 10 mg daily PTA - no change in dose. Take 10 mg by mouth daily      furosemide (LASIX) 40 MG tablet  · Dose increased from 20 mg daily to 40 mg daily. Take 1 tablet by mouth daily One day for 10 days schedule an appointment for next week with Dr Nick Healy.      These are medications you told us you were taking at home, CONTINUE taking them after you leave the hospital   Medication Sig    vitamin B-12 (CYANOCOBALAMIN) 1000 MCG tablet Take 1 tablet by mouth daily    ferrous sulfate (JEFFERSON-NICHO) 325 (65 Fe) MG tablet Take 1 tablet by mouth 2 times daily    apixaban (ELIQUIS) 5 MG TABS tablet Take 5 mg by mouth 2 times daily    albuterol sulfate HFA (PROAIR HFA) 108 (90 Base) MCG/ACT inhaler Inhale 2 puffs into the lungs every 6 hours as needed for Wheezing    allopurinol (ZYLOPRIM) 300 MG tablet TAKE ONE TABLET BY MOUTH ONCE DAILY    busPIRone (BUSPAR) 15 MG tablet TAKE TWO-THIRDS TABLET BY MOUTH TWICE DAILY    clonazePAM (KLONOPIN) 2 MG tablet TAKE ONE TABLET BY MOUTH AT BEDTIME AS NEEDED FOR ANXIETY    ezetimibe (ZETIA) 10 MG tablet Take 1 tablet by mouth daily    folic acid (FOLVITE) 1 MG tablet TAKE ONE TABLET BY MOUTH ONCE DAILY    lamoTRIgine (LAMICTAL) 100 MG tablet TAKE ONE TABLET BY MOUTH ONCE DAILY    lisinopril (PRINIVIL;ZESTRIL) 40 MG tablet Take 1 tablet by mouth daily    metoprolol (LOPRESSOR) 100 MG tablet Take 1 tablet by mouth 2 times daily    rosuvastatin (CRESTOR) 10 MG tablet TAKE ONE TABLET BY MOUTH ONCE DAILY    omeprazole (PRILOSEC OTC) 20 MG tablet Take 20 mg by mouth daily. Meds marked as reviewed. Meds to Beds:No    Estimated Creatinine Clearance: 83 mL/min (based on SCr of 1.1 mg/dL). Assessment/Plan:  - Medication reconciliation completed. Number of medications reviewed: 23    - Pt is taking medications as directed by discharging physician. Number of discrepancies: 1. Instructions per discharge list provided except per below documentation. Identified medication discrepancies/issues:   · Category 1 (0)  · Category 2 (0)  · Category 3 (0)  · Category 4 (1):  1. Vesicare - patient states that he is prescribed and taking 10 mg daily. Confirmed with claims data. Updated home medication list to reflect the correct dose he is taking at home.      - [facility] active medication list updated:  · Medications Added (0)  · Medications Removed (0)  · Medications Changed (1): Vesicare    - Identified Potential Medication Interactions: No clinically significant interactions identified via Lexicomp Interaction Analysis as category D or higher.    - Renal Dosing: No renal adjustments necessary.    - Follow up appointment date (7 days for more severe illness, 14 days for others):    · Patient reminded of upcoming appointment with PCP on 12/4/17. Encouraged patient to call and see if this appointment could get moved up.      Thank you,    Jose Arthur, PharmD  [address]  Phone: [phone]    CLINICAL PHARMACY NOTE   POST-DISCHARGE TELEPHONE FOLLOW-UP ADDENDUM    For Pharmacy Admin Tracking Only    TCM Call Made?: Yes  [facility] Patient?: Yes  Total # of Interventions Recommended: 1 -   - Updated Order #: 1  Total # Interventions Accepted: 1  Intervention Severity:   - Level 1 Intervention Present?: No   - Level 2 #: 0   - Level 3 #: 0  Outreach Status: Review Complete  Care Coordinator Outreach to Patient?: No  Provider Contacted?: No  Time Spent (min): 20     Additional Documentation:

## 2017-11-28 ENCOUNTER — CARE COORDINATION (OUTPATIENT)
Dept: CASE MANAGEMENT | Age: 67
End: 2017-11-28

## 2017-11-28 NOTE — CARE COORDINATION
[facility] Transitions Follow Up Call    2017    Patient: Maxi Grullon  Patient : 1950   MRN: [id number]  Reason for Admission: resp failure, acute dCHF, COPD  Discharge Date: 17 RARS: Risk Score: 14.75       Spoke with: Star Person (the patient)    Non-face-to-face services provided:  Education of patient/family/caregiver/guardian to support self-management-CHF education      Care Transitions Subsequent and Final Call    Subsequent and Final Calls  Do you have any ongoing symptoms?:  No  Have your medications changed?:  No  Do you have any questions related to your medications?:  No  Do you currently have any active services?:  No  Do you have any needs or concerns that I can assist you with?:  No  Identified Barriers:  Impairment  Care Transitions Interventions     Other Services:  Declined (Comment: [facility])   Other Interventions:          Patient reports his breathing is 100% better. He no longer has cp/palpitations/dizziness/cough. His only c/o weakness and voice changes with increased HHN. His edema is almost gone, weight stable at 249#. Patient educated on CHF signs/symptoms, causes, daily weights protocol, low sodium diet, activity, and follow-up. Instructed patient to call the doctor if patient experiences shortness of breath, chest pain, swelling, cough, or weight gain of three pounds in a day/five pounds in a week. Also instructed patient to call the doctor with dizziness, increased fatigue, decreased or difficulty urinating. He declines therapy (home care or OP). Wants to f/up with Dr Shiela Hooks and see what his plan is. He is very complimentary to the hospital follow up calls and concern for him. Denies needs/concerns today. Plans to attend PCP appt on Monday and agreeable to f/up call 1-2 days after.      Future Appointments  Date Time Provider Talia Berry   2017 8:15 AM MD MARJ Urbano   3/7/2018 8:00 AM MD MARJ Urbano

## 2017-11-29 NOTE — TELEPHONE ENCOUNTER
Patient wife informed of the following appointments:     1/18/18 @ 11am - PFT w/ 6MWT  11/18/18 -@ 1pm Appointment.

## 2017-12-04 ENCOUNTER — HOSPITAL ENCOUNTER (OUTPATIENT)
Dept: OTHER | Age: 67
Discharge: HOME | End: 2017-12-04
Attending: FAMILY MEDICINE | Admitting: FAMILY MEDICINE

## 2017-12-04 ENCOUNTER — OFFICE VISIT (OUTPATIENT)
Dept: FAMILY MEDICINE CLINIC | Age: 67
End: 2017-12-04

## 2017-12-04 VITALS
DIASTOLIC BLOOD PRESSURE: 76 MMHG | OXYGEN SATURATION: 95 % | BODY MASS INDEX: 35.57 KG/M2 | SYSTOLIC BLOOD PRESSURE: 116 MMHG | HEART RATE: 104 BPM | WEIGHT: 255 LBS | TEMPERATURE: 98.5 F

## 2017-12-04 DIAGNOSIS — F41.9 ANXIETY: ICD-10-CM

## 2017-12-04 DIAGNOSIS — I50.9 CONGESTIVE HEART FAILURE, UNSPECIFIED CONGESTIVE HEART FAILURE CHRONICITY, UNSPECIFIED CONGESTIVE HEART FAILURE TYPE: ICD-10-CM

## 2017-12-04 DIAGNOSIS — F10.20 ALCOHOLISM (HCC): Chronic | ICD-10-CM

## 2017-12-04 DIAGNOSIS — G47.33 OSA (OBSTRUCTIVE SLEEP APNEA): Primary | Chronic | ICD-10-CM

## 2017-12-04 DIAGNOSIS — J43.8 OTHER EMPHYSEMA (HCC): ICD-10-CM

## 2017-12-04 LAB — PRO-BNP: 579 PG/ML (ref 0–124)

## 2017-12-04 PROCEDURE — G8510 SCR DEP NEG, NO PLAN REQD: HCPCS | Performed by: FAMILY MEDICINE

## 2017-12-04 PROCEDURE — G8926 SPIRO NO PERF OR DOC: HCPCS | Performed by: FAMILY MEDICINE

## 2017-12-04 PROCEDURE — 4040F PNEUMOC VAC/ADMIN/RCVD: CPT | Performed by: FAMILY MEDICINE

## 2017-12-04 PROCEDURE — 1111F DSCHRG MED/CURRENT MED MERGE: CPT | Performed by: FAMILY MEDICINE

## 2017-12-04 PROCEDURE — 3017F COLORECTAL CA SCREEN DOC REV: CPT | Performed by: FAMILY MEDICINE

## 2017-12-04 PROCEDURE — 99215 OFFICE O/P EST HI 40 MIN: CPT | Performed by: FAMILY MEDICINE

## 2017-12-04 PROCEDURE — 3023F SPIROM DOC REV: CPT | Performed by: FAMILY MEDICINE

## 2017-12-04 PROCEDURE — G8598 ASA/ANTIPLAT THER USED: HCPCS | Performed by: FAMILY MEDICINE

## 2017-12-04 PROCEDURE — G8484 FLU IMMUNIZE NO ADMIN: HCPCS | Performed by: FAMILY MEDICINE

## 2017-12-04 PROCEDURE — 4004F PT TOBACCO SCREEN RCVD TLK: CPT | Performed by: FAMILY MEDICINE

## 2017-12-04 PROCEDURE — G8417 CALC BMI ABV UP PARAM F/U: HCPCS | Performed by: FAMILY MEDICINE

## 2017-12-04 PROCEDURE — 1123F ACP DISCUSS/DSCN MKR DOCD: CPT | Performed by: FAMILY MEDICINE

## 2017-12-04 PROCEDURE — G8427 DOCREV CUR MEDS BY ELIG CLIN: HCPCS | Performed by: FAMILY MEDICINE

## 2017-12-04 RX ORDER — CLONAZEPAM 2 MG/1
TABLET ORAL
Qty: 30 TABLET | Refills: 0 | Status: SHIPPED | OUTPATIENT
Start: 2017-12-04 | End: 2018-01-01 | Stop reason: SDUPTHER

## 2017-12-04 ASSESSMENT — PATIENT HEALTH QUESTIONNAIRE - PHQ9
2. FEELING DOWN, DEPRESSED OR HOPELESS: 0
SUM OF ALL RESPONSES TO PHQ9 QUESTIONS 1 & 2: 0
SUM OF ALL RESPONSES TO PHQ QUESTIONS 1-9: 0
1. LITTLE INTEREST OR PLEASURE IN DOING THINGS: 0

## 2017-12-04 NOTE — PROGRESS NOTES
Subjective:      Patient ID: Karlene Mixon is a 79 y.o. male. HPI  Chief Complaint   Patient presents with    Discuss Labs-Taking iron without any difficulty. [facility] We'll keep him on the iron and just recheck his counts occasionally. Still on anticoagulant. 1 month follow up for Iron and B12    Follow-Up from [facility] hospital admission/er visit and all available records from that admission/encounter reviewed in detail with patient;  Patient and spouse relate some confusion about the diagnosis. Apparently pulmonologist and infectious disease and cardiologydid not see his own usual cardiologistargued about the issue. At least as the patient relates. What is on arguable is his chronic alcoholism which makes everything difficult. Patient and spouse and this examiner discussed at length the ramifications of his continued alcohol severity. Patient as usual fails to confront the issue. Was once in rehab 1990 quit for 2 months. He feels it is useless again to try. kartik Winchester admitted for SOB     Anxiety     10/16/17 OAARS      Chief complaint and present illness: 55-year-old white male with cardiac disease for which she sees Dr. Mala Maciel, and other medical problems as treated here presents in follow-up both to the emergency room and hospitalization on November 20 and for his chronic problems. Issues discussed at length. Patient's spouse states there was one drop of blood in his urine. He hasn't appointment to see Dr. Michelle Stuart on December 22. He can discussed that problem with the Dr. Temitope Starks at that time. Discussion re: obstructive sleep apnea. Has it. Poor adherence. Was able to adhere to it in the hospital.  Refer Dr. Inocente Billingsley. Discharge diagnosis COPD with acute flare. Has been referred to pulmonology. This examiner feels that a superfluous and can be managed in this office.   Patient and spouse so advised but of course free to follow their own preferences. Review of Systems   Constitutional: Positive for activity change (\"feels 100% better). Negative for unexpected weight change. HENT: Negative for nosebleeds. Respiratory: Negative for cough, chest tightness and shortness of breath. Cardiovascular: Negative for chest pain. Gastrointestinal: Negative. Negative for blood in stool. Genitourinary: Positive for hematuria. Hematological: Does not bruise/bleed easily. Psychiatric/Behavioral:        Self-destructive alcoholic       Objective:   Physical Exam   Constitutional: He appears well-developed and well-nourished. No distress. HENT:   Head: Normocephalic. Eyes: No scleral icterus. Neck: Neck supple. Cardiovascular: Normal rate and normal heart sounds. An irregularly irregular rhythm present. Pulmonary/Chest: Effort normal. He has no wheezes. He has no rales. Neurological: He is alert. Skin: Skin is warm. No pallor. Psychiatric: He has a normal mood and affect. Nursing note and vitals reviewed. /76 (Site: Right Arm, Position: Sitting, Cuff Size: Large Adult)   Pulse 104   Temp 98.5 °F (36.9 °C) (Oral)   Wt 255 lb (115.7 kg)   SpO2 95%   BMI 35.57 kg/m²     Assessment:      Olive Richards was seen today for discuss labs, follow-up from hospital and anxiety. Diagnoses and all orders for this visit:    LINSEY (non-compliant with home CPAP/BiPAP)  -     [address]    Anxiety  -     clonazePAM (KLONOPIN) 2 MG tablet; TAKE ONE TABLET BY MOUTH AT BEDTIME AS NEEDED FOR ANXIETY. Alcoholism ([facility] [address]    Other emphysema ([facility] [address]  -     PFT-lab; Future  -     6 MIN WALK TEST; Future    Congestive heart failure, unspecified congestive heart failure chronicity, unspecified congestive heart failure type (HCC)  -     BRAIN NATRIURETIC PEPTIDE (BNP); Future           Plan:      There are no Patient Instructions on file for this visit. Return in about 2 weeks (around 12/18/2017).              Post-Discharge  omeprazole (PRILOSEC OTC) 20 MG tablet Take 20 mg by mouth daily. Vitals:    12/04/17 0819   BP: 116/76   Site: Right Arm   Position: Sitting   Cuff Size: Large Adult   Pulse: 104   Temp: 98.5 °F (36.9 °C)   TempSrc: Oral   SpO2: 95%   Weight: 255 lb (115.7 kg)     Body mass index is 35.57 kg/m². Wt Readings from Last 3 Encounters:   12/04/17 255 lb (115.7 kg)   11/21/17 248 lb 14.4 oz (112.9 kg)   11/17/17 254 lb 12.8 oz (115.6 kg)     BP Readings from Last 3 Encounters:   12/04/17 116/76   11/21/17 128/70   11/19/17 (!) 98/50        Patient was [place] [facility]2 for acute shortness of breath. Inpatient course: Discharge summary reviewed- see chart. Current status: Improved    Review of Systems:  See above see above  Physical Exam:  See above    Initial post-discharge communication occurred between nurse care coordinator and patient on 11/22- see documentation in chart: telephone encounter. Assessment/Plan:  Caryn Leyden was seen today for discuss labs, follow-up from hospital and anxiety.     Diagnoses and all orders for this visit:    Anxiety          Diagnostic test results reviewed: inpatient labs and Discharge summary and consultations    Patient risk of morbidity and mortality: moderate    Medical Decision Making: moderate complexity

## 2017-12-05 ENCOUNTER — CARE COORDINATION (OUTPATIENT)
Dept: CASE MANAGEMENT | Age: 67
End: 2017-12-05

## 2017-12-05 ASSESSMENT — ENCOUNTER SYMPTOMS
SHORTNESS OF BREATH: 0
COUGH: 0
CHEST TIGHTNESS: 0
BLOOD IN STOOL: 0
GASTROINTESTINAL NEGATIVE: 1

## 2017-12-05 NOTE — CARE COORDINATION
[facility] Transitions Follow Up Call    2017    Patient: Carrol Rutherford  Patient : 1950   MRN: [id number]  Reason for Admission: resp failure, COPD, acute dCHF  Discharge Date: 17 RARS: Risk Score: 14.75       Spoke with: Samy Long (the patient)    Non-face-to-face services provided:  Patient educated on CHF signs/symptoms, causes, daily weights protocol, low sodium diet, activity, and follow-up. Instructed patient to call the doctor if patient experiences shortness of breath, chest pain, swelling, cough, or weight gain of three pounds in a day/five pounds in a week. Also instructed patient to call the doctor with dizziness, increased fatigue, decreased or difficulty urinating. Care Transitions Subsequent and Final Call    Subsequent and Final Calls  Do you have any ongoing symptoms?:  No  Have your medications changed?:  No  Do you have any questions related to your medications?:  No  Do you currently have any active services?:  No  Do you have any needs or concerns that I can assist you with?:  No  Identified Barriers:  None  Care Transitions Interventions  No Identified Needs     Other Services:  Declined (Comment: [facility])   Other Interventions:          Patient continues on diuretic for slight continued edema per PCP at [address] yesterday. Taking hhn q4h and \"I feel 110% better\". Reports weight has stabilized at 249-250#. Encouraged continued daily weights and s/s to report to MD. He was appreciative of follow up calls and declines needs today. Care transition complete.     Future Appointments  Date Time Provider Talia Berry   2017 8:00 AM MD MARJ Jang [facility]   2018 11:00 AM [facility] PULMONARY FUNCTION TESTING [facility] PFT None   2018 1:00 PM [facility]   2018 11:30 AM [facility]od Shirley [facility]   3/7/2018 8:00 AM MD MARJ Jang [facility]   3/12/2018 9:15 AM Hilario Tomlin MD [facility]       Cintia aCrter RN

## 2017-12-07 ENCOUNTER — HOSPITAL ENCOUNTER (OUTPATIENT)
Dept: OTHER | Age: 67
Discharge: HOME | End: 2017-12-07

## 2017-12-07 PROBLEM — J96.01 ACUTE RESPIRATORY FAILURE WITH HYPOXEMIA (HCC): Status: RESOLVED | Noted: 2017-11-20 | Resolved: 2017-12-07

## 2017-12-07 PROBLEM — F10.929 ACUTE ALCOHOL INTOXICATION (HCC): Status: RESOLVED | Noted: 2017-11-20 | Resolved: 2017-12-07

## 2017-12-07 LAB
ANION GAP SERPL CALCULATED.3IONS-SCNC: 14 MMOL/L (ref 3–16)
BUN BLDV-MCNC: 20 MG/DL (ref 7–20)
CALCIUM SERPL-MCNC: 10 MG/DL (ref 8.3–10.6)
CHLORIDE BLD-SCNC: 95 MMOL/L (ref 99–110)
CO2: 32 MMOL/L (ref 21–32)
CREAT SERPL-MCNC: 1.5 MG/DL (ref 0.8–1.3)
GFR AFRICAN AMERICAN: 56
GFR NON-AFRICAN AMERICAN: 47
GLUCOSE BLD-MCNC: 111 MG/DL (ref 70–99)
POTASSIUM SERPL-SCNC: 4.4 MMOL/L (ref 3.5–5.1)
SODIUM BLD-SCNC: 141 MMOL/L (ref 136–145)

## 2017-12-12 ENCOUNTER — CARE COORDINATION (OUTPATIENT)
Dept: CASE MANAGEMENT | Age: 67
End: 2017-12-12

## 2017-12-21 ENCOUNTER — OFFICE VISIT (OUTPATIENT)
Dept: FAMILY MEDICINE CLINIC | Age: 67
End: 2017-12-21

## 2017-12-21 VITALS
BODY MASS INDEX: 34.59 KG/M2 | HEART RATE: 94 BPM | OXYGEN SATURATION: 92 % | DIASTOLIC BLOOD PRESSURE: 63 MMHG | WEIGHT: 248 LBS | TEMPERATURE: 96 F | SYSTOLIC BLOOD PRESSURE: 97 MMHG

## 2017-12-21 DIAGNOSIS — F41.9 ANXIETY: ICD-10-CM

## 2017-12-21 DIAGNOSIS — M1A.9XX0 CHRONIC GOUT WITHOUT TOPHUS, UNSPECIFIED CAUSE, UNSPECIFIED SITE: ICD-10-CM

## 2017-12-21 PROCEDURE — 1123F ACP DISCUSS/DSCN MKR DOCD: CPT | Performed by: FAMILY MEDICINE

## 2017-12-21 PROCEDURE — 4040F PNEUMOC VAC/ADMIN/RCVD: CPT | Performed by: FAMILY MEDICINE

## 2017-12-21 PROCEDURE — G8484 FLU IMMUNIZE NO ADMIN: HCPCS | Performed by: FAMILY MEDICINE

## 2017-12-21 PROCEDURE — G8598 ASA/ANTIPLAT THER USED: HCPCS | Performed by: FAMILY MEDICINE

## 2017-12-21 PROCEDURE — 1111F DSCHRG MED/CURRENT MED MERGE: CPT | Performed by: FAMILY MEDICINE

## 2017-12-21 PROCEDURE — G8417 CALC BMI ABV UP PARAM F/U: HCPCS | Performed by: FAMILY MEDICINE

## 2017-12-21 PROCEDURE — 4004F PT TOBACCO SCREEN RCVD TLK: CPT | Performed by: FAMILY MEDICINE

## 2017-12-21 PROCEDURE — G8427 DOCREV CUR MEDS BY ELIG CLIN: HCPCS | Performed by: FAMILY MEDICINE

## 2017-12-21 PROCEDURE — 99213 OFFICE O/P EST LOW 20 MIN: CPT | Performed by: FAMILY MEDICINE

## 2017-12-21 PROCEDURE — 3017F COLORECTAL CA SCREEN DOC REV: CPT | Performed by: FAMILY MEDICINE

## 2017-12-21 RX ORDER — FAMOTIDINE 20 MG/1
20 TABLET, FILM COATED ORAL 2 TIMES DAILY
COMMUNITY
End: 2018-10-16

## 2017-12-21 RX ORDER — ALLOPURINOL 300 MG/1
TABLET ORAL
Qty: 30 TABLET | Refills: 5 | Status: CANCELLED | OUTPATIENT
Start: 2017-12-21

## 2017-12-21 NOTE — PROGRESS NOTES
Subjective:      Patient ID: Yoon Birmingham is a 79 y.o. male. HPI   Chief Complaint   Patient presents with    Follow-Up from [facility] hospital admission/er visit and all available records from that admission/encounter reviewed in detail with patient;     -  [facility] ED was transferred to [facility]    Allergic Reaction    Oral Swelling    Other     Alcoholism; sincerely believes he has quit; occasional beer? ?     Chief complaint and present illness: 59-year-old white male presents accompanied by spouse primarily in follow-up to ER visits first to  to [facility] and then down to [facility] for angioedemarelated lisinoprilnow off lisinopril. Reports no problems. From ERs at both [facility] and  accessed. He is grateful for having been told and forceful terms that his alcoholism was can kill him. Review of Systems   All other systems reviewed and are negative. Objective:   Physical Exam   Constitutional: He appears well-developed and well-nourished. HENT:   Mouth/Throat: Oropharynx is clear and moist.   Cardiovascular: Normal rate and regular rhythm. Pulmonary/Chest: Effort normal and breath sounds normal.   Nursing note and vitals reviewed. BP 97/63 (Site: Right Arm, Position: Sitting, Cuff Size: Large Adult)   Pulse 94   Temp 96 °F (35.6 °C) (Oral)   Wt 248 lb (112.5 kg)   SpO2 92% Comment: room air  BMI 34.59 kg/m²       Assessment:      Noel Briggs was seen today for follow-up from hospital, allergic reaction, oral swelling and other. Diagnoses and all orders for this visit:    Chronic gout without tophus, unspecified cause, unspecified site  -     allopurinol (ZYLOPRIM) 300 MG tablet; TAKE ONE TABLET BY MOUTH ONCE DAILY    Anxiety  -     busPIRone (BUSPAR) 15 MG tablet; TAKE TWO-THIRDS TABLET BY MOUTH TWICE DAILY           Plan:      No Follow-up on file. There are no Patient Instructions on file for this visit.

## 2017-12-24 RX ORDER — BUSPIRONE HYDROCHLORIDE 15 MG/1
TABLET ORAL
Qty: 40 TABLET | Refills: 5 | Status: SHIPPED | OUTPATIENT
Start: 2017-12-24 | End: 2018-02-22 | Stop reason: ALTCHOICE

## 2017-12-26 ENCOUNTER — HOSPITAL ENCOUNTER (OUTPATIENT)
Dept: OTHER | Age: 67
Discharge: HOME | End: 2017-12-26
Attending: FAMILY MEDICINE | Admitting: FAMILY MEDICINE

## 2017-12-26 ENCOUNTER — OFFICE VISIT (OUTPATIENT)
Dept: FAMILY MEDICINE CLINIC | Age: 67
End: 2017-12-26

## 2017-12-26 VITALS
SYSTOLIC BLOOD PRESSURE: 110 MMHG | TEMPERATURE: 98.8 F | BODY MASS INDEX: 34.87 KG/M2 | WEIGHT: 250 LBS | OXYGEN SATURATION: 95 % | HEART RATE: 92 BPM | DIASTOLIC BLOOD PRESSURE: 67 MMHG

## 2017-12-26 DIAGNOSIS — R06.01 ORTHOPNEA: ICD-10-CM

## 2017-12-26 DIAGNOSIS — J44.1 COPD EXACERBATION (HCC): Chronic | ICD-10-CM

## 2017-12-26 DIAGNOSIS — I50.21 ACUTE SYSTOLIC CONGESTIVE HEART FAILURE (HCC): Primary | ICD-10-CM

## 2017-12-26 LAB
HCT VFR BLD CALC: 38.7 % (ref 40.5–52.5)
HEMOGLOBIN: 12.8 G/DL (ref 13.5–17.5)
MCH RBC QN AUTO: 29.7 PG (ref 26–34)
MCHC RBC AUTO-ENTMCNC: 33.1 G/DL (ref 31–36)
MCV RBC AUTO: 89.7 FL (ref 80–100)
PDW BLD-RTO: 22.8 % (ref 12.4–15.4)
PLATELET # BLD: 164 K/UL (ref 135–450)
PMV BLD AUTO: 7.5 FL (ref 5–10.5)
PRO-BNP: 463 PG/ML (ref 0–124)
RBC # BLD: 4.31 M/UL (ref 4.2–5.9)
WBC # BLD: 8.9 K/UL (ref 4–11)

## 2017-12-26 PROCEDURE — 99214 OFFICE O/P EST MOD 30 MIN: CPT | Performed by: FAMILY MEDICINE

## 2017-12-26 PROCEDURE — G8926 SPIRO NO PERF OR DOC: HCPCS | Performed by: FAMILY MEDICINE

## 2017-12-26 PROCEDURE — 1123F ACP DISCUSS/DSCN MKR DOCD: CPT | Performed by: FAMILY MEDICINE

## 2017-12-26 PROCEDURE — G8417 CALC BMI ABV UP PARAM F/U: HCPCS | Performed by: FAMILY MEDICINE

## 2017-12-26 PROCEDURE — G8598 ASA/ANTIPLAT THER USED: HCPCS | Performed by: FAMILY MEDICINE

## 2017-12-26 PROCEDURE — 3023F SPIROM DOC REV: CPT | Performed by: FAMILY MEDICINE

## 2017-12-26 PROCEDURE — 4040F PNEUMOC VAC/ADMIN/RCVD: CPT | Performed by: FAMILY MEDICINE

## 2017-12-26 PROCEDURE — 4004F PT TOBACCO SCREEN RCVD TLK: CPT | Performed by: FAMILY MEDICINE

## 2017-12-26 PROCEDURE — G8427 DOCREV CUR MEDS BY ELIG CLIN: HCPCS | Performed by: FAMILY MEDICINE

## 2017-12-26 PROCEDURE — G8484 FLU IMMUNIZE NO ADMIN: HCPCS | Performed by: FAMILY MEDICINE

## 2017-12-26 PROCEDURE — 3017F COLORECTAL CA SCREEN DOC REV: CPT | Performed by: FAMILY MEDICINE

## 2017-12-26 RX ORDER — BUDESONIDE AND FORMOTEROL FUMARATE DIHYDRATE 160; 4.5 UG/1; UG/1
1 AEROSOL RESPIRATORY (INHALATION) 2 TIMES DAILY
Qty: 1 INHALER | Refills: 0 | COMMUNITY
Start: 2017-12-26 | End: 2017-12-26 | Stop reason: CLARIF

## 2017-12-26 RX ORDER — PREDNISONE 10 MG/1
TABLET ORAL
Qty: 20 TABLET | Refills: 0 | Status: SHIPPED
Start: 2017-12-26 | End: 2017-12-27 | Stop reason: SDUPTHER

## 2017-12-26 RX ORDER — PREDNISONE 10 MG/1
TABLET ORAL
Qty: 10 TABLET | Refills: 0 | Status: SHIPPED | OUTPATIENT
Start: 2017-12-26 | End: 2017-12-26 | Stop reason: DRUGHIGH

## 2017-12-26 RX ORDER — BUDESONIDE AND FORMOTEROL FUMARATE DIHYDRATE 160; 4.5 UG/1; UG/1
1 AEROSOL RESPIRATORY (INHALATION) 2 TIMES DAILY
Qty: 1 INHALER | Refills: 0 | COMMUNITY
Start: 2017-12-26 | End: 2018-01-15 | Stop reason: SDUPTHER

## 2017-12-27 DIAGNOSIS — J44.1 COPD EXACERBATION (HCC): Chronic | ICD-10-CM

## 2017-12-27 RX ORDER — PREDNISONE 10 MG/1
TABLET ORAL
Qty: 20 TABLET | Refills: 0 | Status: SHIPPED | OUTPATIENT
Start: 2017-12-27 | End: 2018-01-15 | Stop reason: ALTCHOICE

## 2017-12-27 ASSESSMENT — ENCOUNTER SYMPTOMS
GASTROINTESTINAL NEGATIVE: 1
SHORTNESS OF BREATH: 1
CHEST TIGHTNESS: 1
WHEEZING: 1

## 2018-01-01 DIAGNOSIS — F41.9 ANXIETY: ICD-10-CM

## 2018-01-03 RX ORDER — CLONAZEPAM 2 MG/1
TABLET ORAL
Qty: 30 TABLET | Refills: 2 | Status: SHIPPED | OUTPATIENT
Start: 2018-01-03 | End: 2018-06-15 | Stop reason: SDUPTHER

## 2018-01-15 ENCOUNTER — OFFICE VISIT (OUTPATIENT)
Dept: FAMILY MEDICINE CLINIC | Age: 68
End: 2018-01-15

## 2018-01-15 VITALS
TEMPERATURE: 97.3 F | HEART RATE: 77 BPM | OXYGEN SATURATION: 95 % | BODY MASS INDEX: 34.73 KG/M2 | WEIGHT: 249 LBS | SYSTOLIC BLOOD PRESSURE: 119 MMHG | DIASTOLIC BLOOD PRESSURE: 72 MMHG

## 2018-01-15 DIAGNOSIS — J44.9 CHRONIC OBSTRUCTIVE PULMONARY DISEASE, UNSPECIFIED COPD TYPE (HCC): ICD-10-CM

## 2018-01-15 DIAGNOSIS — I50.31 ACUTE DIASTOLIC CONGESTIVE HEART FAILURE (HCC): Primary | ICD-10-CM

## 2018-01-15 LAB
ALBUMIN SERPL-MCNC: 3.9 G/DL (ref 3.4–5)
ANION GAP SERPL CALCULATED.3IONS-SCNC: 13 MMOL/L (ref 3–16)
BUN BLDV-MCNC: 11 MG/DL (ref 7–20)
CALCIUM SERPL-MCNC: 9.6 MG/DL (ref 8.3–10.6)
CHLORIDE BLD-SCNC: 99 MMOL/L (ref 99–110)
CO2: 31 MMOL/L (ref 21–32)
CREAT SERPL-MCNC: 0.9 MG/DL (ref 0.8–1.3)
GFR AFRICAN AMERICAN: >60
GFR NON-AFRICAN AMERICAN: >60
GLUCOSE BLD-MCNC: 85 MG/DL (ref 70–99)
MAGNESIUM: 2 MG/DL (ref 1.8–2.4)
PHOSPHORUS: 3.8 MG/DL (ref 2.5–4.9)
POTASSIUM SERPL-SCNC: 4.1 MMOL/L (ref 3.5–5.1)
SODIUM BLD-SCNC: 143 MMOL/L (ref 136–145)

## 2018-01-15 PROCEDURE — 3017F COLORECTAL CA SCREEN DOC REV: CPT | Performed by: FAMILY MEDICINE

## 2018-01-15 PROCEDURE — G8598 ASA/ANTIPLAT THER USED: HCPCS | Performed by: FAMILY MEDICINE

## 2018-01-15 PROCEDURE — 99213 OFFICE O/P EST LOW 20 MIN: CPT | Performed by: FAMILY MEDICINE

## 2018-01-15 PROCEDURE — G8427 DOCREV CUR MEDS BY ELIG CLIN: HCPCS | Performed by: FAMILY MEDICINE

## 2018-01-15 PROCEDURE — 1123F ACP DISCUSS/DSCN MKR DOCD: CPT | Performed by: FAMILY MEDICINE

## 2018-01-15 PROCEDURE — 4040F PNEUMOC VAC/ADMIN/RCVD: CPT | Performed by: FAMILY MEDICINE

## 2018-01-15 PROCEDURE — 4004F PT TOBACCO SCREEN RCVD TLK: CPT | Performed by: FAMILY MEDICINE

## 2018-01-15 PROCEDURE — 3023F SPIROM DOC REV: CPT | Performed by: FAMILY MEDICINE

## 2018-01-15 PROCEDURE — G8484 FLU IMMUNIZE NO ADMIN: HCPCS | Performed by: FAMILY MEDICINE

## 2018-01-15 PROCEDURE — G8417 CALC BMI ABV UP PARAM F/U: HCPCS | Performed by: FAMILY MEDICINE

## 2018-01-15 PROCEDURE — G8926 SPIRO NO PERF OR DOC: HCPCS | Performed by: FAMILY MEDICINE

## 2018-01-15 PROCEDURE — 36415 COLL VENOUS BLD VENIPUNCTURE: CPT | Performed by: FAMILY MEDICINE

## 2018-01-15 RX ORDER — IPRATROPIUM BROMIDE AND ALBUTEROL SULFATE 2.5; .5 MG/3ML; MG/3ML
1 SOLUTION RESPIRATORY (INHALATION) EVERY 4 HOURS
Qty: 360 ML | Refills: 2 | Status: SHIPPED | OUTPATIENT
Start: 2018-01-15 | End: 2019-10-02 | Stop reason: SDUPTHER

## 2018-01-15 RX ORDER — FUROSEMIDE 40 MG/1
20 TABLET ORAL DAILY
Qty: 60 TABLET | Refills: 2 | Status: CANCELLED | OUTPATIENT
Start: 2018-01-15

## 2018-01-15 RX ORDER — BUDESONIDE AND FORMOTEROL FUMARATE DIHYDRATE 160; 4.5 UG/1; UG/1
1 AEROSOL RESPIRATORY (INHALATION) 2 TIMES DAILY
Qty: 1 INHALER | Refills: 2 | Status: SHIPPED | OUTPATIENT
Start: 2018-01-15 | End: 2019-01-12 | Stop reason: SDUPTHER

## 2018-01-15 RX ORDER — FUROSEMIDE 40 MG/1
40 TABLET ORAL DAILY
Qty: 30 TABLET | Refills: 3 | Status: SHIPPED | OUTPATIENT
Start: 2018-01-15 | End: 2018-06-13 | Stop reason: SDUPTHER

## 2018-01-15 NOTE — PROGRESS NOTES
Subjective:      Patient ID: Tran Lowe is a 79 y.o. male. HPI   Chief Complaint   Patient presents with    Congestive Heart Failure     follow up    Medication Problem     Lasix twice a day, but has been out of medicine for 3 days     Chief complaint and present illness: 80-year-old white male presents accompanied by spouse for follow-up on his shortness of breath on last visit. Luzmaria Acharya see above note. Patient says he feels better and is breathing more easily. He's been out of furosemide for a few days because the pharmacy was rather rigid about refilling his medicine and did not contact anybody to see if maybe the directions been changed. Also, maybe they didn't explain to the pharmacist that there was a change. In any event no furosemide she was taking twice a day for the last 3 days. Review of Systems   Constitutional: Positive for activity change (better). Negative for unexpected weight change. Respiratory: Negative for cough and shortness of breath. Cardiovascular: Negative for palpitations. neg  Objective:   Physical Exam   Cardiovascular: Normal rate, regular rhythm and normal heart sounds. Regular rhythmdoes not appear to be in atrial fib by auscultation   Pulmonary/Chest: Effort normal and breath sounds normal. He has no wheezes. He has no rales. Musculoskeletal: He exhibits edema (minimal trace edema bilaterally). Nursing note and vitals reviewed. /72 (Site: Left Arm, Position: Sitting, Cuff Size: Large Adult)   Pulse 77   Temp 97.3 °F (36.3 °C) (Oral)   Wt 249 lb (112.9 kg)   SpO2 95% Comment: room air  BMI 34.73 kg/m²       Assessment:      Shantanu Montgomery was seen today for congestive heart failure and medication problem. Diagnoses and all orders for this visit:    Acute diastolic congestive heart failure (HCC)  -     furosemide (LASIX) 40 MG tablet;  Take 1 tablet by mouth daily  -     Renal Function Panel  -     Magnesium    Chronic obstructive pulmonary disease,

## 2018-01-16 DIAGNOSIS — I50.31 ACUTE DIASTOLIC CONGESTIVE HEART FAILURE (HCC): Primary | ICD-10-CM

## 2018-01-16 ASSESSMENT — ENCOUNTER SYMPTOMS
SHORTNESS OF BREATH: 0
COUGH: 0

## 2018-01-18 ENCOUNTER — HOSPITAL ENCOUNTER (OUTPATIENT)
Dept: PULMONOLOGY | Age: 68
Discharge: HOME | End: 2018-01-18
Attending: FAMILY MEDICINE | Admitting: FAMILY MEDICINE

## 2018-01-18 ENCOUNTER — HOSPITAL ENCOUNTER (OUTPATIENT)
Dept: OTHER | Age: 68
Discharge: HOME | End: 2018-01-18
Attending: INTERNAL MEDICINE | Admitting: INTERNAL MEDICINE

## 2018-01-18 ENCOUNTER — OFFICE VISIT (OUTPATIENT)
Dept: PULMONOLOGY | Age: 68
End: 2018-01-18

## 2018-01-18 VITALS — OXYGEN SATURATION: 96 %

## 2018-01-18 VITALS
RESPIRATION RATE: 18 BRPM | OXYGEN SATURATION: 97 % | SYSTOLIC BLOOD PRESSURE: 128 MMHG | TEMPERATURE: 97.5 F | HEIGHT: 71 IN | WEIGHT: 251 LBS | DIASTOLIC BLOOD PRESSURE: 76 MMHG | BODY MASS INDEX: 35.14 KG/M2 | HEART RATE: 87 BPM

## 2018-01-18 DIAGNOSIS — J44.1 COPD EXACERBATION (HCC): Primary | ICD-10-CM

## 2018-01-18 DIAGNOSIS — J44.1 CHRONIC OBSTRUCTIVE PULMONARY DISEASE WITH ACUTE EXACERBATION (HCC): ICD-10-CM

## 2018-01-18 DIAGNOSIS — G47.33 OSA (OBSTRUCTIVE SLEEP APNEA): ICD-10-CM

## 2018-01-18 DIAGNOSIS — J98.4 RESTRICTIVE LUNG DISEASE: ICD-10-CM

## 2018-01-18 PROCEDURE — 4004F PT TOBACCO SCREEN RCVD TLK: CPT | Performed by: INTERNAL MEDICINE

## 2018-01-18 PROCEDURE — G8598 ASA/ANTIPLAT THER USED: HCPCS | Performed by: INTERNAL MEDICINE

## 2018-01-18 PROCEDURE — 4040F PNEUMOC VAC/ADMIN/RCVD: CPT | Performed by: INTERNAL MEDICINE

## 2018-01-18 PROCEDURE — G8484 FLU IMMUNIZE NO ADMIN: HCPCS | Performed by: INTERNAL MEDICINE

## 2018-01-18 PROCEDURE — G8417 CALC BMI ABV UP PARAM F/U: HCPCS | Performed by: INTERNAL MEDICINE

## 2018-01-18 PROCEDURE — 3023F SPIROM DOC REV: CPT | Performed by: INTERNAL MEDICINE

## 2018-01-18 PROCEDURE — 1123F ACP DISCUSS/DSCN MKR DOCD: CPT | Performed by: INTERNAL MEDICINE

## 2018-01-18 PROCEDURE — G8427 DOCREV CUR MEDS BY ELIG CLIN: HCPCS | Performed by: INTERNAL MEDICINE

## 2018-01-18 PROCEDURE — G8926 SPIRO NO PERF OR DOC: HCPCS | Performed by: INTERNAL MEDICINE

## 2018-01-18 PROCEDURE — 3017F COLORECTAL CA SCREEN DOC REV: CPT | Performed by: INTERNAL MEDICINE

## 2018-01-18 PROCEDURE — 99214 OFFICE O/P EST MOD 30 MIN: CPT | Performed by: INTERNAL MEDICINE

## 2018-01-18 RX ORDER — ALBUTEROL SULFATE 2.5 MG/3ML
2.5 SOLUTION RESPIRATORY (INHALATION) ONCE
Status: COMPLETED | OUTPATIENT
Start: 2018-01-18 | End: 2018-01-18

## 2018-01-18 RX ORDER — BUSPIRONE HYDROCHLORIDE 15 MG/1
TABLET ORAL
Qty: 40 TABLET | Refills: 5 | OUTPATIENT
Start: 2018-01-18

## 2018-01-18 RX ORDER — FOLIC ACID 1 MG/1
TABLET ORAL
Qty: 30 TABLET | Refills: 5 | Status: SHIPPED | OUTPATIENT
Start: 2018-01-18 | End: 2018-07-20 | Stop reason: SDUPTHER

## 2018-01-18 RX ADMIN — ALBUTEROL SULFATE 2.5 MG: 2.5 SOLUTION RESPIRATORY (INHALATION) at 10:32

## 2018-01-18 NOTE — PROGRESS NOTES
[facility] Pulmonary, Critical Care, and Sleep    Outpatient Follow Up Note    Chief Complaint: hospital follow up for COPD  Consulting provider: Billy Spencer MD    Interval History: 79 y.o. male Breathing improved since admission. Lasix and duonebs have been helpful. SOB most days on exertion. Wheezes intermittently. Still drinking. Initial HPI: from 11/2017 hospital admission:  h/o COPD, etoh abuse, CAD, LINSEY, CHF Afutter. He presented with etoh intoxication and was found to be hypoxic. His O2 was weaned to 4lpm O2. Elevated lactate. He has a c/o SOB and wheezing.        Current Outpatient Prescriptions:     folic acid (FOLVITE) 1 MG tablet, TAKE ONE TABLET BY MOUTH ONCE DAILY, Disp: 30 tablet, Rfl: 5    budesonide-formoterol (SYMBICORT) 160-4.5 MCG/ACT AERO, Inhale 1 puff into the lungs 2 times daily Lot # [id number] Expiration: 11/2018, Disp: 1 Inhaler, Rfl: 2    ipratropium-albuterol (DUONEB) 0.5-2.5 (3) MG/3ML SOLN nebulizer solution, Inhale 3 mLs into the lungs every 4 hours, Disp: 360 mL, Rfl: 2    furosemide (LASIX) 40 MG tablet, Take 1 tablet by mouth daily, Disp: 30 tablet, Rfl: 3    clonazePAM (KLONOPIN) 2 MG tablet, TAKE ONE TABLET BY MOUTH AT BEDTIME AS NEEDED FOR ANXIETY, Disp: 30 tablet, Rfl: 2    busPIRone (BUSPAR) 15 MG tablet, TAKE TWO-THIRDS TABLET BY MOUTH TWICE DAILY, Disp: 40 tablet, Rfl: 5    famotidine (PEPCID) 20 MG tablet, Take 20 mg by mouth 2 times daily OTC, Disp: , Rfl:     solifenacin (VESICARE) 10 MG tablet, Take 10 mg by mouth daily, Disp: , Rfl:     vitamin B-12 (CYANOCOBALAMIN) 1000 MCG tablet, Take 1 tablet by mouth daily, Disp: 90 tablet, Rfl: 1    ferrous sulfate (JEFFERSON-NICHO) 325 (65 Fe) MG tablet, Take 1 tablet by mouth 2 times daily, Disp: 60 tablet, Rfl: 2    apixaban (ELIQUIS) 5 MG TABS tablet, Take 5 mg by mouth 2 times daily, Disp: , Rfl:     albuterol sulfate HFA (PROAIR HFA) 108 (90 Base) MCG/ACT inhaler, Inhale 2 puffs into the lungs every 6 hours as

## 2018-01-19 NOTE — PROCEDURES
[address]                  [address] [address]                                PULMONARY FUNCTION    PATIENT NAME: Warren Prajapati                   :        1950  MED REC NO:   [id number]                          ROOM:  ACCOUNT NO:   [de-identified]                          ADMIT DATE: 2018  PROVIDER:     Virgie Lovett MD    DATE OF PROCEDURE:  2018    INDICATION:  Shortness of breath. FINDINGS:  1. Spirometry: The FEV1 is 1.96 L, which is 56% predicted. Forced vital  capacity is 2.74 L, which is 58% predicted. The FEV1/FVC ratio is normal.   Inhaled bronchodilators given. There is no significant improvement. 2.  Lung volumes: Total lung capacity is slightly reduced at 5.71 L or 78%  predicted. ERV is 25%. 3.  Diffusion capacity:  DLCO is slightly reduced at 20.94 mL/min/mmHg,  which is 68% predicted. 4.  Flow volume loop does not show obstruction. 5.  Six-minute walk test per [place]. Baseline oxygen saturation  is 96%. Lowest oxygen saturation is 94%. The patient ambulated 1000 feet. IMPRESSION:  1. No obstructive lung disease. 2.  Mild restrictive lung defect. 3.  Mild reduction in diffusion capacity. 4.  No significant oxyhemoglobin desaturation on six-minute walk testing.         Oswald Ayers MD    D: 2018 9:16:53       T: 2018 9:42:26     [email or web address]  Job#: [id number]     Doc#: [id number]    CC:

## 2018-01-22 RX ORDER — BUSPIRONE HYDROCHLORIDE 15 MG/1
TABLET ORAL
Qty: 40 TABLET | Refills: 5 | Status: SHIPPED | OUTPATIENT
Start: 2018-01-22 | End: 2019-08-20

## 2018-01-29 RX ORDER — FERROUS SULFATE 325(65) MG
TABLET ORAL
Qty: 60 TABLET | Refills: 2 | Status: SHIPPED | OUTPATIENT
Start: 2018-01-29 | End: 2018-04-17 | Stop reason: ALTCHOICE

## 2018-01-29 NOTE — TELEPHONE ENCOUNTER
Refill Request for ferrous sulfate (JEFFERSON-NICHO) 325 (65 Fe) MG tablet     Last visit- 1/15/18    Told to follow up- 3 months    Pending appointment- 4/16/18

## 2018-02-03 DIAGNOSIS — E78.5 HYPERLIPIDEMIA WITH TARGET LDL LESS THAN 70: ICD-10-CM

## 2018-02-05 RX ORDER — EZETIMIBE 10 MG/1
TABLET ORAL
Qty: 30 TABLET | Refills: 3 | Status: SHIPPED | OUTPATIENT
Start: 2018-02-05 | End: 2018-06-04 | Stop reason: SDUPTHER

## 2018-02-12 ENCOUNTER — TELEPHONE (OUTPATIENT)
Dept: PULMONOLOGY | Age: 68
End: 2018-02-12

## 2018-02-19 DIAGNOSIS — M1A.9XX0 CHRONIC GOUT WITHOUT TOPHUS, UNSPECIFIED CAUSE, UNSPECIFIED SITE: ICD-10-CM

## 2018-02-19 RX ORDER — ALLOPURINOL 300 MG/1
TABLET ORAL
Qty: 30 TABLET | Refills: 5 | Status: SHIPPED | OUTPATIENT
Start: 2018-02-19 | End: 2018-02-22 | Stop reason: SDUPTHER

## 2018-02-26 ENCOUNTER — NURSE ONLY (OUTPATIENT)
Dept: FAMILY MEDICINE CLINIC | Age: 68
End: 2018-02-26

## 2018-02-26 DIAGNOSIS — I50.9 ACUTE CONGESTIVE HEART FAILURE, UNSPECIFIED CONGESTIVE HEART FAILURE TYPE: ICD-10-CM

## 2018-02-26 LAB — MAGNESIUM: 1.6 MG/DL (ref 1.8–2.4)

## 2018-02-26 PROCEDURE — 36415 COLL VENOUS BLD VENIPUNCTURE: CPT | Performed by: FAMILY MEDICINE

## 2018-02-26 NOTE — PROGRESS NOTES
Blood drawn per order. Needle size: 21g  Site: R Antecubital.  First attempt successful Yes    Pressure applied until bleeding stopped. Cotton ball/bandaid. applied. Patient informed to call office or return if bleeding reoccurs and unable to stop.     Tubes drawn: 0 purple     2 red  Blue top tube drawn

## 2018-02-28 ENCOUNTER — TELEPHONE (OUTPATIENT)
Dept: FAMILY MEDICINE CLINIC | Age: 68
End: 2018-02-28

## 2018-02-28 DIAGNOSIS — R79.0 LOW MAGNESIUM LEVELS: Primary | ICD-10-CM

## 2018-02-28 LAB
ALBUMIN SERPL-MCNC: 4 G/DL (ref 3.4–5)
ANION GAP SERPL CALCULATED.3IONS-SCNC: 21 MMOL/L (ref 3–16)
BUN BLDV-MCNC: 8 MG/DL (ref 7–20)
CALCIUM SERPL-MCNC: 8.5 MG/DL (ref 8.3–10.6)
CHLORIDE BLD-SCNC: 98 MMOL/L (ref 99–110)
CO2: 25 MMOL/L (ref 21–32)
CREAT SERPL-MCNC: 0.9 MG/DL (ref 0.8–1.3)
GFR AFRICAN AMERICAN: >60
GFR NON-AFRICAN AMERICAN: >60
GLUCOSE BLD-MCNC: 83 MG/DL (ref 70–99)
PHOSPHORUS: 3.7 MG/DL (ref 2.5–4.9)
POTASSIUM SERPL-SCNC: 3.8 MMOL/L (ref 3.5–5.1)
SODIUM BLD-SCNC: 144 MMOL/L (ref 136–145)

## 2018-02-28 RX ORDER — CALCIUM CARBONATE 300MG(750)
TABLET,CHEWABLE ORAL
Qty: 30 TABLET | Refills: 2 | Status: SHIPPED | OUTPATIENT
Start: 2018-02-28 | End: 2018-05-08 | Stop reason: SDUPTHER

## 2018-02-28 NOTE — TELEPHONE ENCOUNTER
I faxed the request for this to be added with his magnesium. It appears this \"in process\" so the lab is doing it right now.

## 2018-03-05 ENCOUNTER — TELEPHONE (OUTPATIENT)
Dept: FAMILY MEDICINE CLINIC | Age: 68
End: 2018-03-05

## 2018-03-12 ENCOUNTER — OFFICE VISIT (OUTPATIENT)
Dept: DERMATOLOGY | Age: 68
End: 2018-03-12

## 2018-03-12 DIAGNOSIS — L57.0 ACTINIC KERATOSES: Primary | ICD-10-CM

## 2018-03-12 DIAGNOSIS — L29.9 PRURITUS: ICD-10-CM

## 2018-03-12 DIAGNOSIS — L85.3 XEROSIS CUTIS: ICD-10-CM

## 2018-03-12 DIAGNOSIS — Z12.83 SCREENING EXAM FOR SKIN CANCER: ICD-10-CM

## 2018-03-12 PROCEDURE — 4004F PT TOBACCO SCREEN RCVD TLK: CPT | Performed by: DERMATOLOGY

## 2018-03-12 PROCEDURE — 99213 OFFICE O/P EST LOW 20 MIN: CPT | Performed by: DERMATOLOGY

## 2018-03-12 PROCEDURE — G8427 DOCREV CUR MEDS BY ELIG CLIN: HCPCS | Performed by: DERMATOLOGY

## 2018-03-12 PROCEDURE — 4040F PNEUMOC VAC/ADMIN/RCVD: CPT | Performed by: DERMATOLOGY

## 2018-03-12 PROCEDURE — 17000 DESTRUCT PREMALG LESION: CPT | Performed by: DERMATOLOGY

## 2018-03-12 PROCEDURE — 3017F COLORECTAL CA SCREEN DOC REV: CPT | Performed by: DERMATOLOGY

## 2018-03-12 PROCEDURE — G8417 CALC BMI ABV UP PARAM F/U: HCPCS | Performed by: DERMATOLOGY

## 2018-03-12 PROCEDURE — 17003 DESTRUCT PREMALG LES 2-14: CPT | Performed by: DERMATOLOGY

## 2018-03-12 PROCEDURE — 1123F ACP DISCUSS/DSCN MKR DOCD: CPT | Performed by: DERMATOLOGY

## 2018-03-12 PROCEDURE — G8482 FLU IMMUNIZE ORDER/ADMIN: HCPCS | Performed by: DERMATOLOGY

## 2018-03-12 PROCEDURE — G8598 ASA/ANTIPLAT THER USED: HCPCS | Performed by: DERMATOLOGY

## 2018-03-12 RX ORDER — NICOTINE POLACRILEX 4 MG/1
20 GUM, CHEWING ORAL 2 TIMES DAILY
COMMUNITY
End: 2022-04-20

## 2018-03-12 NOTE — PATIENT INSTRUCTIONS
rubbing alcohol or hydrogen peroxide.  Continue this regimen until the area is pink and healed. Depending on the size and location of your cryosurgery site, healing may take 2 to 4 weeks.  The area may continue to be pink for several weeks, and over the next few months may become darker or lighter than the surrounding skin. This may be a permanent change. DRY SKIN CARE    1. Do not take more than 1 shower or bath each day. Try to spend 10 minutes or less in the shower/bath. 2. Use a moisturizing soap such as Dove, Oil of Olay or Cetaphil. Antibacterial soaps can be too drying. 3. Use soap only on limited areas (face, underarms, groin). Try to avoid using soap on the arms, legs, trunk and back. 4. After showering, pat dry with a towel and generously apply a thick moisturizer all over. 5. If you are able, apply the moisturizer a second time during the day as well. 6. If a prescription cream or ointment has been ordered for you, apply the prescription medication to affected areas after your bath/shower while the skin is still damp, then apply the moisturizer as above. 7. When it comes to moisturizers, the thicker the better. Ointments (such as vaseline) are thicker than creams, and creams are thicker than lotions.  Suggested over-the-counter moisturizers include:    · CeraVe Cream  · Cetaphil Cream  · Lubriderm Cream  · Vaseline (petroleum jelly)  · Aquaphor  · Eucerin Cream  · Neutrogena Moisturizing Cream  · Neutrogena Hand Cream  · Aveeno Moisturizing Cream or Lotion  · Curel Cream       Sarna lotion for cooling effect

## 2018-03-12 NOTE — PROGRESS NOTES
[facility]) Dermatology  Camila Holder MD  [phone]      Patric Benitez  1950    79 y.o. male     Date of Visit: 3/12/2018    Last Visit: 1yr    Chief Complaint: Skin check    History of Present Illness:  1. Here for skin check. History of actinic keratoses s/p cryotherapy, efudex (scalp 2014). Reports rough lesions on cheeks. -Does not spend much time in sun. Wears hat and T shirt regularly. 2. Complains of severe pruritus of back. No associated skin eruption.   -Showers daily w/ Dove to limited areas. Curel lotion nearly daily. Review of Systems:  Constitutional: Reports general sense of well-being. Skin: No interval severe sunburns. Allergies: Reviewed and updated. Past Medical History, Surgical History, Medications and Allergies reviewed. Past Medical History:   Diagnosis Date    Acid reflux     Acute alcohol intoxication ([facility] [address] 11/20/2017    Acute respiratory failure with hypoxemia ([facility]) 11/20/2017    Alcohol withdrawal ([facility] [address] 11/14/2009    Anxiety     CAD (coronary artery disease)     COPD (chronic obstructive pulmonary disease) ([facility])     Hyperlipidemia     Hypertension     LINSEY (obstructive sleep apnea)     PTSD (post-traumatic stress disorder)      Past Surgical History:   Procedure Laterality Date    CARDIAC SURGERY      cabg    CATARACT REMOVAL WITH IMPLANT Left 08/02/2016    PHACO EMULSIFICATION OF CATARACT WITH INTRAOCULAR LENS    CATARACT REMOVAL WITH IMPLANT Right 08/22/2016    COLONOSCOPY  2011    normal    COLONOSCOPY  12/15/2016    EYE SURGERY      cataract bilateral    HERNIA REPAIR      umbilical    KNEE ARTHROSCOPY         Allergies   Allergen Reactions    Lisinopril Other (See Comments)     angioedema is what he had.   See 12/ 2017 ER visit and [facility] hospitalization    Penicillins Hives    Sulfa Antibiotics Hives    Tetanus Toxoids Hives     Outpatient Prescriptions Marked as Taking for the 3/12/18 encounter (Office Visit) with

## 2018-03-19 DIAGNOSIS — E78.5 HYPERLIPIDEMIA WITH TARGET LDL LESS THAN 70: ICD-10-CM

## 2018-03-20 RX ORDER — ROSUVASTATIN CALCIUM 10 MG/1
TABLET, COATED ORAL
Qty: 90 TABLET | Refills: 0 | Status: SHIPPED | OUTPATIENT
Start: 2018-03-20 | End: 2018-10-04 | Stop reason: CLARIF

## 2018-04-12 PROBLEM — R79.89 ELEVATED TROPONIN: Status: RESOLVED | Noted: 2017-11-20 | Resolved: 2018-04-12

## 2018-04-12 PROBLEM — R77.8 ELEVATED TROPONIN: Status: RESOLVED | Noted: 2017-11-20 | Resolved: 2018-04-12

## 2018-04-16 ENCOUNTER — OFFICE VISIT (OUTPATIENT)
Dept: FAMILY MEDICINE CLINIC | Age: 68
End: 2018-04-16

## 2018-04-16 VITALS
DIASTOLIC BLOOD PRESSURE: 82 MMHG | BODY MASS INDEX: 34.58 KG/M2 | OXYGEN SATURATION: 96 % | SYSTOLIC BLOOD PRESSURE: 124 MMHG | HEART RATE: 83 BPM | WEIGHT: 247 LBS | HEIGHT: 71 IN

## 2018-04-16 DIAGNOSIS — I10 ESSENTIAL HYPERTENSION: Primary | ICD-10-CM

## 2018-04-16 DIAGNOSIS — M1A.9XX0 CHRONIC GOUT WITHOUT TOPHUS, UNSPECIFIED CAUSE, UNSPECIFIED SITE: ICD-10-CM

## 2018-04-16 DIAGNOSIS — J44.9 CHRONIC OBSTRUCTIVE PULMONARY DISEASE, UNSPECIFIED COPD TYPE (HCC): ICD-10-CM

## 2018-04-16 DIAGNOSIS — E78.5 HYPERLIPIDEMIA WITH TARGET LDL LESS THAN 70: ICD-10-CM

## 2018-04-16 DIAGNOSIS — F41.9 ANXIETY: ICD-10-CM

## 2018-04-16 DIAGNOSIS — I50.31 ACUTE DIASTOLIC CONGESTIVE HEART FAILURE (HCC): ICD-10-CM

## 2018-04-16 DIAGNOSIS — D50.0 IRON DEFICIENCY ANEMIA DUE TO CHRONIC BLOOD LOSS: ICD-10-CM

## 2018-04-16 LAB
ALBUMIN SERPL-MCNC: 4.4 G/DL (ref 3.4–5)
ANION GAP SERPL CALCULATED.3IONS-SCNC: 17 MMOL/L (ref 3–16)
BUN BLDV-MCNC: 11 MG/DL (ref 7–20)
CALCIUM SERPL-MCNC: 9 MG/DL (ref 8.3–10.6)
CHLORIDE BLD-SCNC: 94 MMOL/L (ref 99–110)
CO2: 31 MMOL/L (ref 21–32)
CREAT SERPL-MCNC: 0.9 MG/DL (ref 0.8–1.3)
GFR AFRICAN AMERICAN: >60
GFR NON-AFRICAN AMERICAN: >60
GLUCOSE BLD-MCNC: 96 MG/DL (ref 70–99)
HCT VFR BLD CALC: 48.6 % (ref 40.5–52.5)
HEMOGLOBIN: 16.6 G/DL (ref 13.5–17.5)
MAGNESIUM: 1.6 MG/DL (ref 1.8–2.4)
MCH RBC QN AUTO: 31.9 PG (ref 26–34)
MCHC RBC AUTO-ENTMCNC: 34.2 G/DL (ref 31–36)
MCV RBC AUTO: 93.3 FL (ref 80–100)
PDW BLD-RTO: 14.6 % (ref 12.4–15.4)
PHOSPHORUS: 3.3 MG/DL (ref 2.5–4.9)
PLATELET # BLD: 109 K/UL (ref 135–450)
PMV BLD AUTO: 9.5 FL (ref 5–10.5)
POTASSIUM SERPL-SCNC: 3.6 MMOL/L (ref 3.5–5.1)
RBC # BLD: 5.21 M/UL (ref 4.2–5.9)
SODIUM BLD-SCNC: 142 MMOL/L (ref 136–145)
WBC # BLD: 5.8 K/UL (ref 4–11)

## 2018-04-16 PROCEDURE — 36415 COLL VENOUS BLD VENIPUNCTURE: CPT | Performed by: FAMILY MEDICINE

## 2018-04-16 PROCEDURE — G8598 ASA/ANTIPLAT THER USED: HCPCS | Performed by: FAMILY MEDICINE

## 2018-04-16 PROCEDURE — G8417 CALC BMI ABV UP PARAM F/U: HCPCS | Performed by: FAMILY MEDICINE

## 2018-04-16 PROCEDURE — 3023F SPIROM DOC REV: CPT | Performed by: FAMILY MEDICINE

## 2018-04-16 PROCEDURE — G8926 SPIRO NO PERF OR DOC: HCPCS | Performed by: FAMILY MEDICINE

## 2018-04-16 PROCEDURE — 4040F PNEUMOC VAC/ADMIN/RCVD: CPT | Performed by: FAMILY MEDICINE

## 2018-04-16 PROCEDURE — 99214 OFFICE O/P EST MOD 30 MIN: CPT | Performed by: FAMILY MEDICINE

## 2018-04-16 PROCEDURE — 3017F COLORECTAL CA SCREEN DOC REV: CPT | Performed by: FAMILY MEDICINE

## 2018-04-16 PROCEDURE — 4004F PT TOBACCO SCREEN RCVD TLK: CPT | Performed by: FAMILY MEDICINE

## 2018-04-16 PROCEDURE — G8427 DOCREV CUR MEDS BY ELIG CLIN: HCPCS | Performed by: FAMILY MEDICINE

## 2018-04-16 PROCEDURE — 1123F ACP DISCUSS/DSCN MKR DOCD: CPT | Performed by: FAMILY MEDICINE

## 2018-04-16 RX ORDER — ALLOPURINOL 300 MG/1
TABLET ORAL
Qty: 30 TABLET | Refills: 5 | Status: CANCELLED | OUTPATIENT
Start: 2018-04-16

## 2018-04-16 RX ORDER — FUROSEMIDE 40 MG/1
40 TABLET ORAL DAILY
Qty: 30 TABLET | Refills: 3 | Status: CANCELLED | OUTPATIENT
Start: 2018-04-16

## 2018-04-16 RX ORDER — ALBUTEROL SULFATE 90 UG/1
2 AEROSOL, METERED RESPIRATORY (INHALATION) EVERY 6 HOURS PRN
Qty: 1 INHALER | Refills: 5 | Status: CANCELLED | OUTPATIENT
Start: 2018-04-16

## 2018-04-16 RX ORDER — EZETIMIBE 10 MG/1
TABLET ORAL
Qty: 30 TABLET | Refills: 3 | Status: CANCELLED | OUTPATIENT
Start: 2018-04-16

## 2018-04-16 RX ORDER — ROSUVASTATIN CALCIUM 10 MG/1
TABLET, COATED ORAL
Qty: 90 TABLET | Refills: 0 | Status: CANCELLED | OUTPATIENT
Start: 2018-04-16

## 2018-04-16 RX ORDER — SOLIFENACIN SUCCINATE 10 MG/1
10 TABLET, FILM COATED ORAL DAILY
Qty: 30 TABLET | Refills: 3 | Status: CANCELLED | OUTPATIENT
Start: 2018-04-16

## 2018-04-16 RX ORDER — NICOTINE POLACRILEX 4 MG/1
20 GUM, CHEWING ORAL DAILY
Qty: 30 TABLET | Refills: 3 | Status: CANCELLED | OUTPATIENT
Start: 2018-04-16

## 2018-04-16 RX ORDER — METOPROLOL TARTRATE 100 MG/1
TABLET ORAL
Qty: 60 TABLET | Refills: 3 | Status: CANCELLED | OUTPATIENT
Start: 2018-04-16

## 2018-04-16 ASSESSMENT — ENCOUNTER SYMPTOMS
ANAL BLEEDING: 0
RESPIRATORY NEGATIVE: 1
BLOOD IN STOOL: 0

## 2018-05-08 DIAGNOSIS — R79.0 LOW MAGNESIUM LEVELS: ICD-10-CM

## 2018-05-08 RX ORDER — CALCIUM CARBONATE 300MG(750)
TABLET,CHEWABLE ORAL
Qty: 30 TABLET | Refills: 2 | Status: SHIPPED | OUTPATIENT
Start: 2018-05-08 | End: 2018-10-04

## 2018-05-18 DIAGNOSIS — Z12.11 SCREENING FOR COLON CANCER: Primary | ICD-10-CM

## 2018-05-26 DIAGNOSIS — I10 ESSENTIAL HYPERTENSION: ICD-10-CM

## 2018-05-29 RX ORDER — METOPROLOL TARTRATE 100 MG/1
TABLET ORAL
Qty: 60 TABLET | Refills: 3 | Status: SHIPPED | OUTPATIENT
Start: 2018-05-29 | End: 2018-09-25 | Stop reason: SDUPTHER

## 2018-06-04 DIAGNOSIS — E78.5 HYPERLIPIDEMIA WITH TARGET LDL LESS THAN 70: ICD-10-CM

## 2018-06-05 RX ORDER — EZETIMIBE 10 MG/1
TABLET ORAL
Qty: 30 TABLET | Refills: 3 | Status: SHIPPED | OUTPATIENT
Start: 2018-06-05 | End: 2018-10-04 | Stop reason: SDUPTHER

## 2018-06-15 DIAGNOSIS — F41.9 ANXIETY: ICD-10-CM

## 2018-06-15 RX ORDER — CLONAZEPAM 2 MG/1
TABLET ORAL
Qty: 30 TABLET | Refills: 3 | Status: SHIPPED | OUTPATIENT
Start: 2018-06-15 | End: 2018-10-04 | Stop reason: SDUPTHER

## 2018-07-10 ENCOUNTER — NURSE ONLY (OUTPATIENT)
Dept: FAMILY MEDICINE CLINIC | Age: 68
End: 2018-07-10

## 2018-07-10 DIAGNOSIS — I50.31 ACUTE DIASTOLIC CONGESTIVE HEART FAILURE (HCC): ICD-10-CM

## 2018-07-10 DIAGNOSIS — D50.8 OTHER IRON DEFICIENCY ANEMIA: ICD-10-CM

## 2018-07-10 LAB
BASOPHILS ABSOLUTE: 0 K/UL (ref 0–0.2)
BASOPHILS RELATIVE PERCENT: 0.9 %
EOSINOPHILS ABSOLUTE: 0.2 K/UL (ref 0–0.6)
EOSINOPHILS RELATIVE PERCENT: 3.6 %
HCT VFR BLD CALC: 44.3 % (ref 40.5–52.5)
HEMOGLOBIN: 15.2 G/DL (ref 13.5–17.5)
LYMPHOCYTES ABSOLUTE: 1.4 K/UL (ref 1–5.1)
LYMPHOCYTES RELATIVE PERCENT: 28 %
MAGNESIUM: 1.6 MG/DL (ref 1.8–2.4)
MCH RBC QN AUTO: 32.8 PG (ref 26–34)
MCHC RBC AUTO-ENTMCNC: 34.2 G/DL (ref 31–36)
MCV RBC AUTO: 95.9 FL (ref 80–100)
MONOCYTES ABSOLUTE: 0.6 K/UL (ref 0–1.3)
MONOCYTES RELATIVE PERCENT: 12.6 %
NEUTROPHILS ABSOLUTE: 2.7 K/UL (ref 1.7–7.7)
NEUTROPHILS RELATIVE PERCENT: 54.9 %
PDW BLD-RTO: 15.4 % (ref 12.4–15.4)
PLATELET # BLD: 105 K/UL (ref 135–450)
PMV BLD AUTO: 9.5 FL (ref 5–10.5)
RBC # BLD: 4.62 M/UL (ref 4.2–5.9)
WBC # BLD: 5 K/UL (ref 4–11)

## 2018-07-10 PROCEDURE — 36415 COLL VENOUS BLD VENIPUNCTURE: CPT | Performed by: FAMILY MEDICINE

## 2018-07-10 NOTE — PROGRESS NOTES
Blood drawn per order. Needle size: 21 g  Site: R Antecubital.  First attempt successful Yes    Second attempt no    Pressure applied until bleeding stopped. Cotton ball and bandaid applied. Patient informed to call office or return if bleeding reoccurs and unable to stop.     Tubes drawn: 1 purple     1 red

## 2018-07-19 DIAGNOSIS — E78.5 HYPERLIPIDEMIA WITH TARGET LDL LESS THAN 70: ICD-10-CM

## 2018-07-19 RX ORDER — ROSUVASTATIN CALCIUM 10 MG/1
TABLET, COATED ORAL
Qty: 30 TABLET | Refills: 3 | Status: SHIPPED | OUTPATIENT
Start: 2018-07-19 | End: 2018-11-16 | Stop reason: SDUPTHER

## 2018-07-19 NOTE — TELEPHONE ENCOUNTER
Refill Request for rosuvastatin (CRESTOR) 10 MG tablet     Last visit- 4/16/18    Told to follow up- 6 months    Pending appointment- 10/4/18    Additional Comments -

## 2018-07-20 RX ORDER — FOLIC ACID 1 MG/1
TABLET ORAL
Qty: 30 TABLET | Refills: 5 | Status: SHIPPED | OUTPATIENT
Start: 2018-07-20 | End: 2019-01-21 | Stop reason: SDUPTHER

## 2018-08-20 DIAGNOSIS — M1A.9XX0 CHRONIC GOUT WITHOUT TOPHUS, UNSPECIFIED CAUSE, UNSPECIFIED SITE: ICD-10-CM

## 2018-08-20 RX ORDER — ALLOPURINOL 300 MG/1
TABLET ORAL
Qty: 90 TABLET | Refills: 0 | Status: SHIPPED | OUTPATIENT
Start: 2018-08-20 | End: 2018-10-04

## 2018-09-25 DIAGNOSIS — I10 ESSENTIAL HYPERTENSION: ICD-10-CM

## 2018-09-26 RX ORDER — METOPROLOL TARTRATE 100 MG/1
TABLET ORAL
Qty: 60 TABLET | Refills: 0 | Status: SHIPPED | OUTPATIENT
Start: 2018-09-26 | End: 2018-10-04 | Stop reason: SDUPTHER

## 2018-10-04 ENCOUNTER — OFFICE VISIT (OUTPATIENT)
Dept: FAMILY MEDICINE CLINIC | Age: 68
End: 2018-10-04
Payer: MEDICARE

## 2018-10-04 VITALS
HEART RATE: 81 BPM | HEIGHT: 71 IN | BODY MASS INDEX: 33.6 KG/M2 | OXYGEN SATURATION: 97 % | DIASTOLIC BLOOD PRESSURE: 60 MMHG | SYSTOLIC BLOOD PRESSURE: 120 MMHG | TEMPERATURE: 97.3 F | WEIGHT: 240 LBS

## 2018-10-04 DIAGNOSIS — Z23 NEED FOR INFLUENZA VACCINATION: ICD-10-CM

## 2018-10-04 DIAGNOSIS — L29.9 PRURITUS: ICD-10-CM

## 2018-10-04 DIAGNOSIS — D64.9 ANEMIA, UNSPECIFIED TYPE: ICD-10-CM

## 2018-10-04 DIAGNOSIS — F41.9 ANXIETY: ICD-10-CM

## 2018-10-04 DIAGNOSIS — E78.5 HYPERLIPIDEMIA WITH TARGET LDL LESS THAN 70: ICD-10-CM

## 2018-10-04 DIAGNOSIS — I10 ESSENTIAL HYPERTENSION: Primary | ICD-10-CM

## 2018-10-04 DIAGNOSIS — Z23 NEED FOR PROPHYLACTIC VACCINATION AGAINST STREPTOCOCCUS PNEUMONIAE (PNEUMOCOCCUS): ICD-10-CM

## 2018-10-04 DIAGNOSIS — M1A.9XX0 CHRONIC GOUT WITHOUT TOPHUS, UNSPECIFIED CAUSE, UNSPECIFIED SITE: ICD-10-CM

## 2018-10-04 LAB
A/G RATIO: 1.3 (ref 1.1–2.2)
ALBUMIN SERPL-MCNC: 4.1 G/DL (ref 3.4–5)
ALP BLD-CCNC: 90 U/L (ref 40–129)
ALT SERPL-CCNC: 24 U/L (ref 10–40)
ANION GAP SERPL CALCULATED.3IONS-SCNC: 13 MMOL/L (ref 3–16)
AST SERPL-CCNC: 48 U/L (ref 15–37)
BILIRUB SERPL-MCNC: 1.3 MG/DL (ref 0–1)
BUN BLDV-MCNC: 10 MG/DL (ref 7–20)
CALCIUM SERPL-MCNC: 9.7 MG/DL (ref 8.3–10.6)
CHLORIDE BLD-SCNC: 101 MMOL/L (ref 99–110)
CHOLESTEROL, TOTAL: 125 MG/DL (ref 0–199)
CO2: 27 MMOL/L (ref 21–32)
CREAT SERPL-MCNC: 1 MG/DL (ref 0.8–1.3)
GFR AFRICAN AMERICAN: >60
GFR NON-AFRICAN AMERICAN: >60
GLOBULIN: 3.1 G/DL
GLUCOSE BLD-MCNC: 102 MG/DL (ref 70–99)
HCT VFR BLD CALC: 46.6 % (ref 40.5–52.5)
HDLC SERPL-MCNC: 61 MG/DL (ref 40–60)
HEMOGLOBIN: 15.3 G/DL (ref 13.5–17.5)
LDL CHOLESTEROL CALCULATED: 50 MG/DL
MAGNESIUM: 1.8 MG/DL (ref 1.8–2.4)
MCH RBC QN AUTO: 30.6 PG (ref 26–34)
MCHC RBC AUTO-ENTMCNC: 32.8 G/DL (ref 31–36)
MCV RBC AUTO: 93.4 FL (ref 80–100)
PDW BLD-RTO: 14.3 % (ref 12.4–15.4)
PLATELET # BLD: 140 K/UL (ref 135–450)
PMV BLD AUTO: 10.1 FL (ref 5–10.5)
POTASSIUM SERPL-SCNC: 4.8 MMOL/L (ref 3.5–5.1)
RBC # BLD: 4.99 M/UL (ref 4.2–5.9)
SODIUM BLD-SCNC: 141 MMOL/L (ref 136–145)
TOTAL PROTEIN: 7.2 G/DL (ref 6.4–8.2)
TRIGL SERPL-MCNC: 71 MG/DL (ref 0–150)
URIC ACID, SERUM: 3.7 MG/DL (ref 3.5–7.2)
VLDLC SERPL CALC-MCNC: 14 MG/DL
WBC # BLD: 5.4 K/UL (ref 4–11)

## 2018-10-04 PROCEDURE — 3017F COLORECTAL CA SCREEN DOC REV: CPT | Performed by: FAMILY MEDICINE

## 2018-10-04 PROCEDURE — G8510 SCR DEP NEG, NO PLAN REQD: HCPCS | Performed by: FAMILY MEDICINE

## 2018-10-04 PROCEDURE — 1101F PT FALLS ASSESS-DOCD LE1/YR: CPT | Performed by: FAMILY MEDICINE

## 2018-10-04 PROCEDURE — G8417 CALC BMI ABV UP PARAM F/U: HCPCS | Performed by: FAMILY MEDICINE

## 2018-10-04 PROCEDURE — 36415 COLL VENOUS BLD VENIPUNCTURE: CPT | Performed by: FAMILY MEDICINE

## 2018-10-04 PROCEDURE — G8427 DOCREV CUR MEDS BY ELIG CLIN: HCPCS | Performed by: FAMILY MEDICINE

## 2018-10-04 PROCEDURE — 4040F PNEUMOC VAC/ADMIN/RCVD: CPT | Performed by: FAMILY MEDICINE

## 2018-10-04 PROCEDURE — 99214 OFFICE O/P EST MOD 30 MIN: CPT | Performed by: FAMILY MEDICINE

## 2018-10-04 PROCEDURE — G8482 FLU IMMUNIZE ORDER/ADMIN: HCPCS | Performed by: FAMILY MEDICINE

## 2018-10-04 PROCEDURE — G0008 ADMIN INFLUENZA VIRUS VAC: HCPCS | Performed by: FAMILY MEDICINE

## 2018-10-04 PROCEDURE — 4004F PT TOBACCO SCREEN RCVD TLK: CPT | Performed by: FAMILY MEDICINE

## 2018-10-04 PROCEDURE — 1123F ACP DISCUSS/DSCN MKR DOCD: CPT | Performed by: FAMILY MEDICINE

## 2018-10-04 PROCEDURE — 90662 IIV NO PRSV INCREASED AG IM: CPT | Performed by: FAMILY MEDICINE

## 2018-10-04 PROCEDURE — 3288F FALL RISK ASSESSMENT DOCD: CPT | Performed by: FAMILY MEDICINE

## 2018-10-04 PROCEDURE — G8598 ASA/ANTIPLAT THER USED: HCPCS | Performed by: FAMILY MEDICINE

## 2018-10-04 RX ORDER — ALLOPURINOL 300 MG/1
TABLET ORAL
Qty: 30 TABLET | Refills: 5 | Status: SHIPPED | OUTPATIENT
Start: 2018-10-04 | End: 2019-05-27 | Stop reason: SDUPTHER

## 2018-10-04 RX ORDER — METOPROLOL TARTRATE 100 MG/1
TABLET ORAL
Qty: 60 TABLET | Refills: 5 | Status: SHIPPED | OUTPATIENT
Start: 2018-10-04 | End: 2019-04-28 | Stop reason: SDUPTHER

## 2018-10-04 RX ORDER — EZETIMIBE 10 MG/1
TABLET ORAL
Qty: 30 TABLET | Refills: 5 | Status: SHIPPED | OUTPATIENT
Start: 2018-10-04 | End: 2018-10-07 | Stop reason: ALTCHOICE

## 2018-10-04 RX ORDER — CLONAZEPAM 2 MG/1
TABLET ORAL
Qty: 30 TABLET | Refills: 5 | Status: SHIPPED | OUTPATIENT
Start: 2018-10-04 | End: 2019-04-08 | Stop reason: SDUPTHER

## 2018-10-04 ASSESSMENT — PATIENT HEALTH QUESTIONNAIRE - PHQ9
SUM OF ALL RESPONSES TO PHQ QUESTIONS 1-9: 0
2. FEELING DOWN, DEPRESSED OR HOPELESS: 0
1. LITTLE INTEREST OR PLEASURE IN DOING THINGS: 0
SUM OF ALL RESPONSES TO PHQ9 QUESTIONS 1 & 2: 0
SUM OF ALL RESPONSES TO PHQ QUESTIONS 1-9: 0

## 2018-10-04 ASSESSMENT — ENCOUNTER SYMPTOMS: SHORTNESS OF BREATH: 0

## 2018-10-04 NOTE — PROGRESS NOTES
Subjective:      Patient ID: Junior Hudson is a 76 y.o. male. HPI  Chief Complaint   Patient presents with    Hyperlipidemia     fasting     Hypertension     Without cardiovascular, CNS or peripheral vascular complaints;    Anxiety     Controlled    COPD     Symbicort 1 puff once a day; no HHN;    Gastroesophageal Reflux     No progressive symptoms; no Tevin or dysphagia    Coronary Artery Disease     Cc Joselito; note reviewedtaken off furosemide    Flu Vaccine     No record of PPS 50;    Alcohol Problem     Drinking but patient believes he is drinking less. Cold total abstinence. Unfortunately my goal not his    Other     Goutcontrolled; serum magnesium low last timetaking 3 once a day; anemiano increased bleedingL a course. Chief complaint present was: 55-year-old white male presents unaccompanied but wife called because his memory is bad no some questions about his medicines. Review of Systems   Constitutional: Positive for activity change (walking regularly and feels great; less short of breath). Respiratory: Negative for shortness of breath (improved). All other systems reviewed and are negative. background/entire past medical,social and family history obtained and reviewed/updated today   Objective:   Physical Exam   Constitutional: He appears well-developed and well-nourished. Neck: Neck supple. Cardiovascular: Normal rate, regular rhythm and normal heart sounds. Pulse is 70 and regularsinus rhythm? Pulmonary/Chest: Effort normal and breath sounds normal. He has no wheezes. He has no rales. Musculoskeletal: He exhibits no edema. Lymphadenopathy:     He has no cervical adenopathy. Neurological: He is alert. Skin: Skin is warm. No pallor. Psychiatric: He has a normal mood and affect. Nursing note and vitals reviewed.     /62   Pulse 81   Temp 97.3 °F (36.3 °C) (Oral)   Ht 5' 11\" (1.803 m)   Wt 240 lb (108.9 kg)   SpO2 97%   BMI 33.47 kg/m² Assessment:      Aracely was seen today for hyperlipidemia, hypertension, anxiety, copd, gastroesophageal reflux, coronary artery disease, flu vaccine, alcohol problem and other. Diagnoses and all orders for this visit:    Essential hypertension  -     metoprolol (LOPRESSOR) 100 MG tablet; TAKE 1 TABLET BY MOUTH TWICE DAILY  -     Comprehensive Metabolic Panel  -     Magnesium    Need for influenza vaccination    Hyperlipidemia with target LDL less than 70  -     ezetimibe (ZETIA) 10 MG tablet; TAKE ONE TABLET BY MOUTH ONCE DAILY  -     Comprehensive Metabolic Panel  -     Lipid Panel    Anxiety  -     clonazePAM (KLONOPIN) 2 MG tablet; TAKE ONE TABLET BY MOUTH AT BEDTIME AS NEEDED FOR ANXIETY. Chronic gout without tophus, unspecified cause, unspecified site  -     allopurinol (ZYLOPRIM) 300 MG tablet; TAKE ONE TABLET BY MOUTH ONCE DAILY  -     Uric Acid    Need for prophylactic vaccination against Streptococcus pneumoniae (pneumococcus)  -     pneumococcal polyvalent (PNEUMOVAX 23) 25 MCG/0.5ML inj; Inject 0.5 mLs into the muscle once for 1 dose    Pruritus  -     Comprehensive Metabolic Panel    Anemia, unspecified type  -     CBC    Other orders  -     INFLUENZA, HIGH DOSE, 65 YRS +, IM, PF, PREFILL SYR, 0.5ML (FLUZONE HD)  -     Cancel: magnesium oxide (MAG-OX) 400 (241.3 Mg) MG TABS tablet; TAKE 1 TABLET BY MOUTH ONCE DAILY           Plan:      Return in about 6 months (around 4/4/2019).    Patient Instructions   We will call about the magnesiumwhether you need to continue it and how much           Figueroa Monroy MA

## 2018-10-05 ENCOUNTER — TELEPHONE (OUTPATIENT)
Dept: FAMILY MEDICINE CLINIC | Age: 68
End: 2018-10-05

## 2018-12-05 NOTE — OP NOTE
Arsenio Schwab    OPERATIVE NOTE    Preoperative Diagnosis: Posterior Capsular Opacification the left eye    Postoperative Diagnosis: Posterior Capsular Opacification the left eye    Procedure: YAG posterior capsulotomy the left eye    Surgeon: Abad Campbell M.D., Ph.D. Anesthesia: Topical    Complications: none    Specimens: none    Indications for procedure: The patient is a 76y.o. year old who is status post cataract extraction with intraocular lens implantation who complained of increasing glare and blurry vision. Visually significant posterior capsular opacification was noted on examination. Risks, benefits, and alternatives to surgery were discussed with the patient and the patient elected to proceed with YAG laser posterior capsulotomy of the the left eye. Details of the procedure: The patient was brought the laser room. The patient had topical proparacaine drops instilled. The patient was positioned at the YAG laser where 48 shots were administered at 3.5 millijoules for a total power of 160 millijoules in a cruciate pattern. The patient tolerated the procedure well. One drop of prednisolone acetate 1% was placed on the eye. The patient will follow up as an outpatient as scheduled.     Abad Campbell MD, PhD

## 2018-12-11 ENCOUNTER — HOSPITAL ENCOUNTER (OUTPATIENT)
Dept: OPERATING ROOM | Age: 68
Setting detail: OUTPATIENT SURGERY
Discharge: HOME | End: 2018-12-11
Payer: MEDICARE

## 2018-12-11 VITALS
OXYGEN SATURATION: 96 % | HEART RATE: 74 BPM | RESPIRATION RATE: 14 BRPM | SYSTOLIC BLOOD PRESSURE: 128 MMHG | DIASTOLIC BLOOD PRESSURE: 82 MMHG

## 2018-12-11 PROCEDURE — 6370000000 HC RX 637 (ALT 250 FOR IP): Performed by: OPHTHALMOLOGY

## 2018-12-11 PROCEDURE — 66821 AFTER CATARACT LASER SURGERY: CPT

## 2018-12-11 RX ORDER — TROPICAMIDE 10 MG/ML
1 SOLUTION/ DROPS OPHTHALMIC
Status: COMPLETED | OUTPATIENT
Start: 2018-12-11 | End: 2018-12-11

## 2018-12-11 RX ORDER — PROPARACAINE HYDROCHLORIDE 5 MG/ML
1 SOLUTION/ DROPS OPHTHALMIC
Status: ACTIVE | OUTPATIENT
Start: 2018-12-11 | End: 2018-12-11

## 2018-12-11 RX ADMIN — TROPICAMIDE 1 DROP: 10 SOLUTION/ DROPS OPHTHALMIC at 07:18

## 2018-12-11 ASSESSMENT — PAIN - FUNCTIONAL ASSESSMENT: PAIN_FUNCTIONAL_ASSESSMENT: 0-10

## 2019-01-08 ENCOUNTER — HOSPITAL ENCOUNTER (OUTPATIENT)
Dept: OPERATING ROOM | Age: 69
Setting detail: OUTPATIENT SURGERY
Discharge: HOME | End: 2019-01-08
Payer: MEDICARE

## 2019-01-08 VITALS — DIASTOLIC BLOOD PRESSURE: 65 MMHG | SYSTOLIC BLOOD PRESSURE: 126 MMHG | HEART RATE: 79 BPM

## 2019-01-08 PROCEDURE — 66821 AFTER CATARACT LASER SURGERY: CPT

## 2019-01-08 PROCEDURE — 6370000000 HC RX 637 (ALT 250 FOR IP): Performed by: OPHTHALMOLOGY

## 2019-01-08 RX ORDER — TROPICAMIDE 10 MG/ML
1 SOLUTION/ DROPS OPHTHALMIC ONCE
Status: COMPLETED | OUTPATIENT
Start: 2019-01-08 | End: 2019-01-08

## 2019-01-08 RX ORDER — PROPARACAINE HYDROCHLORIDE 5 MG/ML
1 SOLUTION/ DROPS OPHTHALMIC
Status: ACTIVE | OUTPATIENT
Start: 2019-01-08 | End: 2019-01-08

## 2019-01-08 RX ORDER — FUROSEMIDE 40 MG/1
40 TABLET ORAL DAILY
COMMUNITY
End: 2019-03-15 | Stop reason: SDUPTHER

## 2019-01-08 RX ADMIN — TROPICAMIDE 1 DROP: 10 SOLUTION/ DROPS OPHTHALMIC at 07:46

## 2019-01-12 DIAGNOSIS — J44.9 CHRONIC OBSTRUCTIVE PULMONARY DISEASE, UNSPECIFIED COPD TYPE (HCC): ICD-10-CM

## 2019-01-14 RX ORDER — BUDESONIDE AND FORMOTEROL FUMARATE DIHYDRATE 160; 4.5 UG/1; UG/1
AEROSOL RESPIRATORY (INHALATION)
Qty: 1 INHALER | Refills: 2 | Status: SHIPPED | OUTPATIENT
Start: 2019-01-14 | End: 2019-08-08 | Stop reason: SDUPTHER

## 2019-01-15 DIAGNOSIS — F41.9 ANXIETY: ICD-10-CM

## 2019-01-15 RX ORDER — BUSPIRONE HYDROCHLORIDE 15 MG/1
TABLET ORAL
Qty: 40 TABLET | Refills: 1 | Status: SHIPPED | OUTPATIENT
Start: 2019-01-15 | End: 2019-04-01 | Stop reason: SDUPTHER

## 2019-01-21 RX ORDER — FOLIC ACID 1 MG/1
TABLET ORAL
Qty: 30 TABLET | Refills: 5 | Status: SHIPPED | OUTPATIENT
Start: 2019-01-21 | End: 2019-07-31 | Stop reason: SDUPTHER

## 2019-03-14 ENCOUNTER — OFFICE VISIT (OUTPATIENT)
Dept: FAMILY MEDICINE CLINIC | Age: 69
End: 2019-03-14
Payer: MEDICARE

## 2019-03-14 VITALS
HEIGHT: 71 IN | WEIGHT: 251 LBS | DIASTOLIC BLOOD PRESSURE: 78 MMHG | SYSTOLIC BLOOD PRESSURE: 122 MMHG | HEART RATE: 70 BPM | OXYGEN SATURATION: 97 % | BODY MASS INDEX: 35.14 KG/M2

## 2019-03-14 DIAGNOSIS — R06.02 SHORTNESS OF BREATH: Primary | ICD-10-CM

## 2019-03-14 DIAGNOSIS — J43.8 OTHER EMPHYSEMA (HCC): ICD-10-CM

## 2019-03-14 DIAGNOSIS — R16.0 HEPATOMEGALY: ICD-10-CM

## 2019-03-14 DIAGNOSIS — I48.20 CHRONIC A-FIB (HCC): ICD-10-CM

## 2019-03-14 DIAGNOSIS — F10.20 ALCOHOLISM (HCC): ICD-10-CM

## 2019-03-14 DIAGNOSIS — I50.9 CONGESTIVE HEART FAILURE, UNSPECIFIED HF CHRONICITY, UNSPECIFIED HEART FAILURE TYPE (HCC): ICD-10-CM

## 2019-03-14 DIAGNOSIS — I48.19 PERSISTENT ATRIAL FIBRILLATION (HCC): ICD-10-CM

## 2019-03-14 LAB
A/G RATIO: 1.3 (ref 1.1–2.2)
ALBUMIN SERPL-MCNC: 4.5 G/DL (ref 3.4–5)
ALP BLD-CCNC: 95 U/L (ref 40–129)
ALT SERPL-CCNC: 35 U/L (ref 10–40)
ANION GAP SERPL CALCULATED.3IONS-SCNC: 16 MMOL/L (ref 3–16)
AST SERPL-CCNC: 76 U/L (ref 15–37)
BILIRUB SERPL-MCNC: 1.5 MG/DL (ref 0–1)
BUN BLDV-MCNC: 11 MG/DL (ref 7–20)
CALCIUM SERPL-MCNC: 9.2 MG/DL (ref 8.3–10.6)
CHLORIDE BLD-SCNC: 92 MMOL/L (ref 99–110)
CO2: 33 MMOL/L (ref 21–32)
CREAT SERPL-MCNC: 0.9 MG/DL (ref 0.8–1.3)
GFR AFRICAN AMERICAN: >60
GFR NON-AFRICAN AMERICAN: >60
GLOBULIN: 3.4 G/DL
GLUCOSE BLD-MCNC: 171 MG/DL (ref 70–99)
POTASSIUM SERPL-SCNC: 3.3 MMOL/L (ref 3.5–5.1)
SODIUM BLD-SCNC: 141 MMOL/L (ref 136–145)
TOTAL PROTEIN: 7.9 G/DL (ref 6.4–8.2)

## 2019-03-14 PROCEDURE — G8598 ASA/ANTIPLAT THER USED: HCPCS | Performed by: FAMILY MEDICINE

## 2019-03-14 PROCEDURE — G8482 FLU IMMUNIZE ORDER/ADMIN: HCPCS | Performed by: FAMILY MEDICINE

## 2019-03-14 PROCEDURE — 99214 OFFICE O/P EST MOD 30 MIN: CPT | Performed by: FAMILY MEDICINE

## 2019-03-14 PROCEDURE — 1123F ACP DISCUSS/DSCN MKR DOCD: CPT | Performed by: FAMILY MEDICINE

## 2019-03-14 PROCEDURE — 4004F PT TOBACCO SCREEN RCVD TLK: CPT | Performed by: FAMILY MEDICINE

## 2019-03-14 PROCEDURE — 36415 COLL VENOUS BLD VENIPUNCTURE: CPT | Performed by: FAMILY MEDICINE

## 2019-03-14 PROCEDURE — 1101F PT FALLS ASSESS-DOCD LE1/YR: CPT | Performed by: FAMILY MEDICINE

## 2019-03-14 PROCEDURE — G8417 CALC BMI ABV UP PARAM F/U: HCPCS | Performed by: FAMILY MEDICINE

## 2019-03-14 PROCEDURE — 4040F PNEUMOC VAC/ADMIN/RCVD: CPT | Performed by: FAMILY MEDICINE

## 2019-03-14 PROCEDURE — 3017F COLORECTAL CA SCREEN DOC REV: CPT | Performed by: FAMILY MEDICINE

## 2019-03-14 PROCEDURE — G8427 DOCREV CUR MEDS BY ELIG CLIN: HCPCS | Performed by: FAMILY MEDICINE

## 2019-03-14 PROCEDURE — G8926 SPIRO NO PERF OR DOC: HCPCS | Performed by: FAMILY MEDICINE

## 2019-03-14 PROCEDURE — 3023F SPIROM DOC REV: CPT | Performed by: FAMILY MEDICINE

## 2019-03-15 RX ORDER — POTASSIUM CHLORIDE 20 MEQ/1
20 TABLET, EXTENDED RELEASE ORAL 2 TIMES DAILY
Qty: 60 TABLET | Refills: 1 | Status: SHIPPED | OUTPATIENT
Start: 2019-03-15 | End: 2019-05-14 | Stop reason: SDUPTHER

## 2019-03-15 RX ORDER — FUROSEMIDE 40 MG/1
40 TABLET ORAL 2 TIMES DAILY
Qty: 60 TABLET | Refills: 0 | Status: SHIPPED | OUTPATIENT
Start: 2019-03-15 | End: 2019-04-14 | Stop reason: SDUPTHER

## 2019-03-15 ASSESSMENT — ENCOUNTER SYMPTOMS
APNEA: 1
ABDOMINAL DISTENTION: 1
CHEST TIGHTNESS: 0
COUGH: 0
SHORTNESS OF BREATH: 1

## 2019-03-18 ENCOUNTER — TELEPHONE (OUTPATIENT)
Dept: FAMILY MEDICINE CLINIC | Age: 69
End: 2019-03-18

## 2019-03-19 ENCOUNTER — OFFICE VISIT (OUTPATIENT)
Dept: FAMILY MEDICINE CLINIC | Age: 69
End: 2019-03-19
Payer: MEDICARE

## 2019-03-19 VITALS
DIASTOLIC BLOOD PRESSURE: 60 MMHG | BODY MASS INDEX: 34.58 KG/M2 | HEIGHT: 71 IN | WEIGHT: 247 LBS | OXYGEN SATURATION: 97 % | SYSTOLIC BLOOD PRESSURE: 108 MMHG | HEART RATE: 75 BPM

## 2019-03-19 DIAGNOSIS — I50.9 CONGESTIVE HEART FAILURE, UNSPECIFIED HF CHRONICITY, UNSPECIFIED HEART FAILURE TYPE (HCC): Primary | ICD-10-CM

## 2019-03-19 PROCEDURE — 1101F PT FALLS ASSESS-DOCD LE1/YR: CPT | Performed by: FAMILY MEDICINE

## 2019-03-19 PROCEDURE — G8427 DOCREV CUR MEDS BY ELIG CLIN: HCPCS | Performed by: FAMILY MEDICINE

## 2019-03-19 PROCEDURE — 1123F ACP DISCUSS/DSCN MKR DOCD: CPT | Performed by: FAMILY MEDICINE

## 2019-03-19 PROCEDURE — 4004F PT TOBACCO SCREEN RCVD TLK: CPT | Performed by: FAMILY MEDICINE

## 2019-03-19 PROCEDURE — 4040F PNEUMOC VAC/ADMIN/RCVD: CPT | Performed by: FAMILY MEDICINE

## 2019-03-19 PROCEDURE — G8598 ASA/ANTIPLAT THER USED: HCPCS | Performed by: FAMILY MEDICINE

## 2019-03-19 PROCEDURE — 3017F COLORECTAL CA SCREEN DOC REV: CPT | Performed by: FAMILY MEDICINE

## 2019-03-19 PROCEDURE — 99213 OFFICE O/P EST LOW 20 MIN: CPT | Performed by: FAMILY MEDICINE

## 2019-03-19 PROCEDURE — G8417 CALC BMI ABV UP PARAM F/U: HCPCS | Performed by: FAMILY MEDICINE

## 2019-03-19 PROCEDURE — G8482 FLU IMMUNIZE ORDER/ADMIN: HCPCS | Performed by: FAMILY MEDICINE

## 2019-03-21 ASSESSMENT — ENCOUNTER SYMPTOMS: SHORTNESS OF BREATH: 1

## 2019-04-01 ENCOUNTER — OFFICE VISIT (OUTPATIENT)
Dept: DERMATOLOGY | Age: 69
End: 2019-04-01
Payer: MEDICARE

## 2019-04-01 DIAGNOSIS — L85.3 XEROSIS CUTIS: ICD-10-CM

## 2019-04-01 DIAGNOSIS — Z12.83 SCREENING EXAM FOR SKIN CANCER: ICD-10-CM

## 2019-04-01 DIAGNOSIS — L57.0 ACTINIC KERATOSES: Primary | ICD-10-CM

## 2019-04-01 DIAGNOSIS — F41.9 ANXIETY: ICD-10-CM

## 2019-04-01 DIAGNOSIS — L29.9 PRURITUS: ICD-10-CM

## 2019-04-01 PROCEDURE — G8598 ASA/ANTIPLAT THER USED: HCPCS | Performed by: DERMATOLOGY

## 2019-04-01 PROCEDURE — G8427 DOCREV CUR MEDS BY ELIG CLIN: HCPCS | Performed by: DERMATOLOGY

## 2019-04-01 PROCEDURE — 1123F ACP DISCUSS/DSCN MKR DOCD: CPT | Performed by: DERMATOLOGY

## 2019-04-01 PROCEDURE — 17000 DESTRUCT PREMALG LESION: CPT | Performed by: DERMATOLOGY

## 2019-04-01 PROCEDURE — 3017F COLORECTAL CA SCREEN DOC REV: CPT | Performed by: DERMATOLOGY

## 2019-04-01 PROCEDURE — 99213 OFFICE O/P EST LOW 20 MIN: CPT | Performed by: DERMATOLOGY

## 2019-04-01 PROCEDURE — 17003 DESTRUCT PREMALG LES 2-14: CPT | Performed by: DERMATOLOGY

## 2019-04-01 PROCEDURE — 4040F PNEUMOC VAC/ADMIN/RCVD: CPT | Performed by: DERMATOLOGY

## 2019-04-01 PROCEDURE — G8417 CALC BMI ABV UP PARAM F/U: HCPCS | Performed by: DERMATOLOGY

## 2019-04-01 PROCEDURE — 4004F PT TOBACCO SCREEN RCVD TLK: CPT | Performed by: DERMATOLOGY

## 2019-04-01 NOTE — PROGRESS NOTES
[facility]) Dermatology  Walter Mehta MD  [phone]      Irina Fisher  1950    76 y.o. male     Date of Visit: 4/1/2019    Last Visit: 1yr    Chief Complaint: Skin check    History of Present Illness:  1. Here for skin check. History of actinic keratoses s/p cryotherapy, efudex (scalp 2014). Unsure of new lesions.   -Does not spend much time in sun. Wears hat and T shirt regularly. 2. F/u pruritus 2/2 xerosis. Reports no improvement w/ Dove to limited areas, Curel lotion daily. Review of Systems:  Constitutional: Reports general sense of well-being. Skin: No interval severe sunburns. Allergies: Reviewed and updated. Past Medical History, Surgical History, Medications and Allergies reviewed. Past Medical History:   Diagnosis Date    Acid reflux     Acute alcohol intoxication ([facility] [address] 11/20/2017    Acute respiratory failure with hypoxemia ([facility]) 11/20/2017    Alcohol withdrawal ([facility] [address] 11/14/2009    Anxiety     CAD (coronary artery disease)     COPD (chronic obstructive pulmonary disease) ([facility])     Hyperlipidemia     Hypertension     LINSEY (obstructive sleep apnea)     PTSD (post-traumatic stress disorder)      Past Surgical History:   Procedure Laterality Date    CARDIAC SURGERY      cabg    CATARACT REMOVAL WITH IMPLANT Left 08/02/2016    PHACO EMULSIFICATION OF CATARACT WITH INTRAOCULAR LENS    CATARACT REMOVAL WITH IMPLANT Right 08/22/2016    COLONOSCOPY  2011    normal    COLONOSCOPY  12/15/2016    EYE SURGERY      cataract bilateral    HERNIA REPAIR      umbilical    KNEE ARTHROSCOPY         Allergies   Allergen Reactions    Lisinopril Other (See Comments)     angioedema is what he had.   See 12/ 2017 ER visit and [facility] hospitalization    Penicillins Hives    Sulfa Antibiotics Hives    Tetanus Toxoids Hives     Outpatient Medications Marked as Taking for the 4/1/19 encounter (Office Visit) with Walter Mehta MD   Medication Sig Dispense Refill   [address] [place] [address] furosemide (LASIX) 40 MG tablet Take 1 tablet by mouth 2 times daily 60 tablet 0    potassium chloride (KLOR-CON M) 20 MEQ extended release tablet Take 1 tablet by mouth 2 times daily 60 tablet 1    folic acid (FOLVITE) 1 MG tablet TAKE 1 TABLET BY MOUTH ONCE DAILY 30 tablet 5    busPIRone (BUSPAR) 15 MG tablet TAKE TWO-THIRDS TABLET BY MOUTH TWICE DAILY 40 tablet 1    SYMBICORT 160-4.5 MCG/ACT AERO INHALE ONE PUFF BY MOUTH TWICE DAILY 1 Inhaler 2    VENTOLIN  (90 Base) MCG/ACT inhaler INHALE TWO PUFFS BY MOUTH EVERY 6 HOURS AS NEEDED FOR WHEEZING 1 Inhaler 5    rosuvastatin (CRESTOR) 10 MG tablet TAKE 1 TABLET BY MOUTH ONCE DAILY 30 tablet 4    clonazePAM (KLONOPIN) 2 MG tablet TAKE ONE TABLET BY MOUTH AT BEDTIME AS NEEDED FOR ANXIETY. 30 tablet 5    allopurinol (ZYLOPRIM) 300 MG tablet TAKE ONE TABLET BY MOUTH ONCE DAILY 30 tablet 5    metoprolol (LOPRESSOR) 100 MG tablet TAKE 1 TABLET BY MOUTH TWICE DAILY 60 tablet 5    omeprazole 20 MG EC tablet Take 20 mg by mouth daily      busPIRone (BUSPAR) 15 MG tablet TAKE TWO-THIRDS OF A TABLET BY MOUTH TWICE DAILY 40 tablet 5    ipratropium-albuterol (DUONEB) 0.5-2.5 (3) MG/3ML SOLN nebulizer solution Inhale 3 mLs into the lungs every 4 hours 360 mL 2    solifenacin (VESICARE) 10 MG tablet Take 10 mg by mouth daily      vitamin B-12 (CYANOCOBALAMIN) 1000 MCG tablet Take 1 tablet by mouth daily 90 tablet 1    apixaban (ELIQUIS) 5 MG TABS tablet Take 5 mg by mouth 2 times daily       Social History: Retired electric company     Physical Examination     The following were examined and determined to be normal: Psych/Neuro, Scalp/hair, Conjunctivae/eyelids, Gums/teeth/lips, Neck, Breast/axilla/chest, Abdomen, Nails/digits and Genitalia/groin/buttocks. The following were examined and determined to be abnormal: Head/face, Back, RUE, LUE, RLE and LLE. -General: Well-appearing, NAD  1.  R 1 and L 1 helices, forehead 2, L forearm 1, R upper lip 1 -

## 2019-04-02 RX ORDER — BUSPIRONE HYDROCHLORIDE 15 MG/1
TABLET ORAL
Qty: 40 TABLET | Refills: 0 | Status: SHIPPED | OUTPATIENT
Start: 2019-04-02 | End: 2019-05-01 | Stop reason: SDUPTHER

## 2019-04-08 ENCOUNTER — OFFICE VISIT (OUTPATIENT)
Dept: FAMILY MEDICINE CLINIC | Age: 69
End: 2019-04-08
Payer: MEDICARE

## 2019-04-08 VITALS
BODY MASS INDEX: 34.59 KG/M2 | DIASTOLIC BLOOD PRESSURE: 76 MMHG | HEART RATE: 71 BPM | OXYGEN SATURATION: 96 % | WEIGHT: 248 LBS | SYSTOLIC BLOOD PRESSURE: 122 MMHG

## 2019-04-08 DIAGNOSIS — I50.9 CONGESTIVE HEART FAILURE, UNSPECIFIED HF CHRONICITY, UNSPECIFIED HEART FAILURE TYPE (HCC): Primary | ICD-10-CM

## 2019-04-08 DIAGNOSIS — F41.9 ANXIETY: ICD-10-CM

## 2019-04-08 LAB
ALBUMIN SERPL-MCNC: 4 G/DL (ref 3.4–5)
ANION GAP SERPL CALCULATED.3IONS-SCNC: 13 MMOL/L (ref 3–16)
BUN BLDV-MCNC: 10 MG/DL (ref 7–20)
CALCIUM SERPL-MCNC: 8.6 MG/DL (ref 8.3–10.6)
CHLORIDE BLD-SCNC: 97 MMOL/L (ref 99–110)
CO2: 31 MMOL/L (ref 21–32)
CREAT SERPL-MCNC: 0.8 MG/DL (ref 0.8–1.3)
GFR AFRICAN AMERICAN: >60
GFR NON-AFRICAN AMERICAN: >60
GLUCOSE BLD-MCNC: 102 MG/DL (ref 70–99)
PHOSPHORUS: 3.7 MG/DL (ref 2.5–4.9)
POTASSIUM SERPL-SCNC: 3.9 MMOL/L (ref 3.5–5.1)
SODIUM BLD-SCNC: 141 MMOL/L (ref 136–145)

## 2019-04-08 PROCEDURE — 99214 OFFICE O/P EST MOD 30 MIN: CPT | Performed by: FAMILY MEDICINE

## 2019-04-08 PROCEDURE — G8598 ASA/ANTIPLAT THER USED: HCPCS | Performed by: FAMILY MEDICINE

## 2019-04-08 PROCEDURE — G8417 CALC BMI ABV UP PARAM F/U: HCPCS | Performed by: FAMILY MEDICINE

## 2019-04-08 PROCEDURE — 4004F PT TOBACCO SCREEN RCVD TLK: CPT | Performed by: FAMILY MEDICINE

## 2019-04-08 PROCEDURE — 3017F COLORECTAL CA SCREEN DOC REV: CPT | Performed by: FAMILY MEDICINE

## 2019-04-08 PROCEDURE — G8427 DOCREV CUR MEDS BY ELIG CLIN: HCPCS | Performed by: FAMILY MEDICINE

## 2019-04-08 PROCEDURE — 1123F ACP DISCUSS/DSCN MKR DOCD: CPT | Performed by: FAMILY MEDICINE

## 2019-04-08 PROCEDURE — 36415 COLL VENOUS BLD VENIPUNCTURE: CPT | Performed by: FAMILY MEDICINE

## 2019-04-08 PROCEDURE — 4040F PNEUMOC VAC/ADMIN/RCVD: CPT | Performed by: FAMILY MEDICINE

## 2019-04-08 RX ORDER — CLONAZEPAM 2 MG/1
TABLET ORAL
Qty: 30 TABLET | Refills: 5 | Status: SHIPPED | OUTPATIENT
Start: 2019-04-08 | End: 2019-10-02 | Stop reason: SDUPTHER

## 2019-04-08 ASSESSMENT — PATIENT HEALTH QUESTIONNAIRE - PHQ9
SUM OF ALL RESPONSES TO PHQ9 QUESTIONS 1 & 2: 0
SUM OF ALL RESPONSES TO PHQ QUESTIONS 1-9: 0
2. FEELING DOWN, DEPRESSED OR HOPELESS: 0
SUM OF ALL RESPONSES TO PHQ QUESTIONS 1-9: 0
1. LITTLE INTEREST OR PLEASURE IN DOING THINGS: 0

## 2019-04-08 NOTE — PROGRESS NOTES
Blood drawn per order. Needle size: 23 g butterfly  Site: R Basilic. First attempt successful Yes    Second attempt na    Pressure applied until bleeding stopped. Cotton ball and band aid  applied. Patient informed to call office or return if bleeding reoccurs and unable to stop.     Tubes drawn: 0 purple     1 red
hypertension, hyperlipidemia, anxiety, copd, gastroesophageal reflux, gout and coronary artery disease. Diagnoses and all orders for this visit:    Congestive heart failure, unspecified HF chronicity, unspecified [facility]  -     Renal Function Panel    Anxiety  -     clonazePAM (KLONOPIN) 2 MG tablet; TAKE ONE TABLET BY MOUTH AT BEDTIME AS NEEDED FOR ANXIETY           Plan:      There are no Patient Instructions on file for this visit. Return in about 6 months (around 10/8/2019).          Guido Emanuel MA

## 2019-04-28 DIAGNOSIS — I10 ESSENTIAL HYPERTENSION: ICD-10-CM

## 2019-04-29 RX ORDER — METOPROLOL TARTRATE 100 MG/1
TABLET ORAL
Qty: 180 TABLET | Refills: 1 | Status: SHIPPED | OUTPATIENT
Start: 2019-04-29 | End: 2019-10-02 | Stop reason: SDUPTHER

## 2019-05-01 DIAGNOSIS — F41.9 ANXIETY: ICD-10-CM

## 2019-05-01 RX ORDER — BUSPIRONE HYDROCHLORIDE 15 MG/1
TABLET ORAL
Qty: 40 TABLET | Refills: 5 | Status: SHIPPED | OUTPATIENT
Start: 2019-05-01 | End: 2019-10-02 | Stop reason: SDUPTHER

## 2019-05-14 RX ORDER — POTASSIUM CHLORIDE 20 MEQ/1
TABLET, EXTENDED RELEASE ORAL
Qty: 60 TABLET | Refills: 1 | Status: SHIPPED | OUTPATIENT
Start: 2019-05-14 | End: 2019-07-16 | Stop reason: SDUPTHER

## 2019-05-27 DIAGNOSIS — M1A.9XX0 CHRONIC GOUT WITHOUT TOPHUS, UNSPECIFIED CAUSE, UNSPECIFIED SITE: ICD-10-CM

## 2019-05-28 RX ORDER — ALLOPURINOL 300 MG/1
TABLET ORAL
Qty: 90 TABLET | Refills: 1 | Status: SHIPPED | OUTPATIENT
Start: 2019-05-28 | End: 2019-10-02 | Stop reason: SDUPTHER

## 2019-05-28 NOTE — TELEPHONE ENCOUNTER
Refill Request for Allopurinol 300 MG    Last visit- 4/8/19    Told to follow up- 6 months    Pending appointment- 10/2/19    Additional Comments -

## 2019-07-16 RX ORDER — POTASSIUM CHLORIDE 20 MEQ/1
TABLET, EXTENDED RELEASE ORAL
Qty: 60 TABLET | Refills: 3 | Status: SHIPPED | OUTPATIENT
Start: 2019-07-16 | End: 2019-10-02 | Stop reason: SDUPTHER

## 2019-07-31 RX ORDER — FOLIC ACID 1 MG/1
TABLET ORAL
Qty: 90 TABLET | Refills: 1 | Status: SHIPPED | OUTPATIENT
Start: 2019-07-31 | End: 2019-10-02 | Stop reason: SDUPTHER

## 2019-07-31 NOTE — TELEPHONE ENCOUNTER
Refill Request for folic acid (FOLVITE) 1 MG tablet     Last visit- 4/8/19    Told to follow up- 6 months    Pending appointment- 10/2/19    Additional Comments -

## 2019-08-08 DIAGNOSIS — J44.9 CHRONIC OBSTRUCTIVE PULMONARY DISEASE, UNSPECIFIED COPD TYPE (HCC): ICD-10-CM

## 2019-08-08 RX ORDER — BUDESONIDE AND FORMOTEROL FUMARATE DIHYDRATE 160; 4.5 UG/1; UG/1
AEROSOL RESPIRATORY (INHALATION)
Qty: 11 INHALER | Refills: 2 | Status: SHIPPED | OUTPATIENT
Start: 2019-08-08 | End: 2019-10-02 | Stop reason: SDUPTHER

## 2019-08-20 ENCOUNTER — OFFICE VISIT (OUTPATIENT)
Dept: FAMILY MEDICINE CLINIC | Age: 69
End: 2019-08-20
Payer: MEDICARE

## 2019-08-20 ENCOUNTER — TELEPHONE (OUTPATIENT)
Dept: FAMILY MEDICINE CLINIC | Age: 69
End: 2019-08-20

## 2019-08-20 VITALS
BODY MASS INDEX: 35.29 KG/M2 | HEART RATE: 76 BPM | SYSTOLIC BLOOD PRESSURE: 116 MMHG | WEIGHT: 253 LBS | OXYGEN SATURATION: 97 % | DIASTOLIC BLOOD PRESSURE: 68 MMHG

## 2019-08-20 DIAGNOSIS — Z23 NEED FOR PNEUMOCOCCAL VACCINE: Primary | ICD-10-CM

## 2019-08-20 DIAGNOSIS — Z00.00 ROUTINE GENERAL MEDICAL EXAMINATION AT A HEALTH CARE FACILITY: ICD-10-CM

## 2019-08-20 PROCEDURE — G0009 ADMIN PNEUMOCOCCAL VACCINE: HCPCS | Performed by: NURSE PRACTITIONER

## 2019-08-20 PROCEDURE — G8598 ASA/ANTIPLAT THER USED: HCPCS | Performed by: NURSE PRACTITIONER

## 2019-08-20 PROCEDURE — G0438 PPPS, INITIAL VISIT: HCPCS | Performed by: NURSE PRACTITIONER

## 2019-08-20 PROCEDURE — 3017F COLORECTAL CA SCREEN DOC REV: CPT | Performed by: NURSE PRACTITIONER

## 2019-08-20 PROCEDURE — 90732 PPSV23 VACC 2 YRS+ SUBQ/IM: CPT | Performed by: NURSE PRACTITIONER

## 2019-08-20 PROCEDURE — 4040F PNEUMOC VAC/ADMIN/RCVD: CPT | Performed by: NURSE PRACTITIONER

## 2019-08-20 PROCEDURE — 1123F ACP DISCUSS/DSCN MKR DOCD: CPT | Performed by: NURSE PRACTITIONER

## 2019-08-20 RX ORDER — MIRABEGRON 50 MG/1
50 TABLET, FILM COATED, EXTENDED RELEASE ORAL DAILY
COMMUNITY
Start: 2019-07-31

## 2019-08-20 RX ORDER — FESOTERODINE FUMARATE 8 MG/1
1 TABLET, FILM COATED, EXTENDED RELEASE ORAL DAILY
Refills: 11 | COMMUNITY
Start: 2019-06-13 | End: 2020-03-16 | Stop reason: SDUPTHER

## 2019-08-20 ASSESSMENT — LIFESTYLE VARIABLES
HOW OFTEN DO YOU HAVE A DRINK CONTAINING ALCOHOL: 4
HOW OFTEN DURING THE LAST YEAR HAVE YOU HAD A FEELING OF GUILT OR REMORSE AFTER DRINKING: 0
HAVE YOU OR SOMEONE ELSE BEEN INJURED AS A RESULT OF YOUR DRINKING: 0
AUDIT TOTAL SCORE: 11
HAS A RELATIVE, FRIEND, DOCTOR, OR ANOTHER HEALTH PROFESSIONAL EXPRESSED CONCERN ABOUT YOUR DRINKING OR SUGGESTED YOU CUT DOWN: 2
HOW OFTEN DURING THE LAST YEAR HAVE YOU FAILED TO DO WHAT WAS NORMALLY EXPECTED FROM YOU BECAUSE OF DRINKING: 0
HOW OFTEN DURING THE LAST YEAR HAVE YOU FOUND THAT YOU WERE NOT ABLE TO STOP DRINKING ONCE YOU HAD STARTED: 0
HOW MANY STANDARD DRINKS CONTAINING ALCOHOL DO YOU HAVE ON A TYPICAL DAY: 2
HOW OFTEN DURING THE LAST YEAR HAVE YOU BEEN UNABLE TO REMEMBER WHAT HAPPENED THE NIGHT BEFORE BECAUSE YOU HAD BEEN DRINKING: 0
HOW OFTEN DO YOU HAVE SIX OR MORE DRINKS ON ONE OCCASION: 3
HOW OFTEN DURING THE LAST YEAR HAVE YOU NEEDED AN ALCOHOLIC DRINK FIRST THING IN THE MORNING TO GET YOURSELF GOING AFTER A NIGHT OF HEAVY DRINKING: 0
AUDIT-C TOTAL SCORE: 9

## 2019-08-20 ASSESSMENT — PATIENT HEALTH QUESTIONNAIRE - PHQ9: SUM OF ALL RESPONSES TO PHQ QUESTIONS 1-9: 9

## 2019-08-20 NOTE — PATIENT INSTRUCTIONS
calcium requirement with diet alone, but a vitamin D supplement is usually necessary to meet this goal.  · When exposed to the sun, use a sunscreen that protects against both UVA and UVB radiation with an SPF of 30 or greater. Reapply every 2 to 3 hours or after sweating, drying off with a towel, or swimming. · Always wear a seat belt when traveling in a car. Always wear a helmet when riding a bicycle or motorcycle. Patient Education        Well Visit, Over 72: Care Instructions  Your Care Instructions    Physical exams can help you stay healthy. Your doctor has checked your overall health and may have suggested ways to take good care of yourself. He or she also may have recommended tests. At home, you can help prevent illness with healthy eating, regular exercise, and other steps. Follow-up care is a key part of your treatment and safety. Be sure to make and go to all appointments, and call your doctor if you are having problems. It's also a good idea to know your test results and keep a list of the medicines you take. How can you care for yourself at home? Reach and stay at a healthy weight. This will lower your risk for many problems, such as obesity, diabetes, heart disease, and high blood pressure. Get at least 30 minutes of exercise on most days of the week. Walking is a good choice. You also may want to do other activities, such as running, swimming, cycling, or playing tennis or team sports. Do not smoke. Smoking can make health problems worse. If you need help quitting, talk to your doctor about stop-smoking programs and medicines. These can increase your chances of quitting for good. Protect your skin from too much sun. When you're outdoors from 10 a.m. to 4 p.m., stay in the shade or cover up with clothing and a hat with a wide brim. Wear sunglasses that block UV rays. Even when it's cloudy, put broad-spectrum sunscreen (SPF 30 or higher) on any exposed skin.   See a dentist one or two times a year

## 2019-08-22 ENCOUNTER — HOSPITAL ENCOUNTER (EMERGENCY)
Age: 69
Discharge: HOME | End: 2019-08-22
Payer: MEDICARE

## 2019-08-22 VITALS
TEMPERATURE: 98.4 F | SYSTOLIC BLOOD PRESSURE: 125 MMHG | RESPIRATION RATE: 16 BRPM | DIASTOLIC BLOOD PRESSURE: 79 MMHG | HEART RATE: 89 BPM | OXYGEN SATURATION: 95 % | WEIGHT: 250 LBS | BODY MASS INDEX: 35 KG/M2 | HEIGHT: 71 IN

## 2019-08-22 DIAGNOSIS — L03.90 CELLULITIS OF SKIN: Primary | ICD-10-CM

## 2019-08-22 PROCEDURE — 99282 EMERGENCY DEPT VISIT SF MDM: CPT

## 2019-08-22 PROCEDURE — 6370000000 HC RX 637 (ALT 250 FOR IP): Performed by: PHYSICIAN ASSISTANT

## 2019-08-22 RX ORDER — DOXYCYCLINE HYCLATE 100 MG
100 TABLET ORAL 2 TIMES DAILY
Qty: 20 TABLET | Refills: 0 | Status: SHIPPED | OUTPATIENT
Start: 2019-08-22 | End: 2019-09-01

## 2019-08-22 RX ORDER — DOXYCYCLINE HYCLATE 100 MG
100 TABLET ORAL ONCE
Status: COMPLETED | OUTPATIENT
Start: 2019-08-22 | End: 2019-08-22

## 2019-08-22 RX ADMIN — DOXYCYCLINE HYCLATE 100 MG: 100 TABLET, COATED ORAL at 13:55

## 2019-08-22 ASSESSMENT — PAIN DESCRIPTION - ORIENTATION: ORIENTATION: LEFT;UPPER

## 2019-08-22 ASSESSMENT — ENCOUNTER SYMPTOMS
VOICE CHANGE: 0
VOMITING: 0
SORE THROAT: 0
NAUSEA: 0
COLOR CHANGE: 1
TROUBLE SWALLOWING: 0
ABDOMINAL PAIN: 0

## 2019-08-22 ASSESSMENT — PAIN DESCRIPTION - LOCATION: LOCATION: ARM

## 2019-08-22 ASSESSMENT — PAIN DESCRIPTION - DESCRIPTORS: DESCRIPTORS: ACHING

## 2019-08-22 ASSESSMENT — PAIN - FUNCTIONAL ASSESSMENT: PAIN_FUNCTIONAL_ASSESSMENT: PREVENTS OR INTERFERES SOME ACTIVE ACTIVITIES AND ADLS

## 2019-08-22 ASSESSMENT — PAIN SCALES - GENERAL: PAINLEVEL_OUTOF10: 6

## 2019-08-22 ASSESSMENT — PAIN DESCRIPTION - ONSET: ONSET: SUDDEN

## 2019-08-22 ASSESSMENT — PAIN DESCRIPTION - FREQUENCY: FREQUENCY: INTERMITTENT

## 2019-08-22 ASSESSMENT — PAIN DESCRIPTION - PAIN TYPE: TYPE: ACUTE PAIN

## 2019-08-22 ASSESSMENT — PAIN DESCRIPTION - PROGRESSION: CLINICAL_PROGRESSION: NOT CHANGED

## 2019-08-22 NOTE — ED PROVIDER NOTES
[facility]        Pt Name: Estela Savage  MRN: [id number]  [place] 1950  Date of evaluation: 8/22/2019  Provider: Talib Wong PA-C  PCP: Sarita Barcenas MD    This patient was not seen and evaluated by the attending physician       [address] [place] [address]       Chief Complaint   Patient presents with    Rash     Patient has left bicep pain and redness with intermitent chills since recieving his prevnar shot on tuesday. Patient is concerned he is having a reaction to his vaccine       HISTORY OF PRESENT ILLNESS   (Location/Symptom, Timing/Onset, Context/Setting, Quality, Duration, Modifying Factors, Severity)  Note limiting factors. Estela Savage is a 71 y.o. male who presents to the emergency department for evaluation of redness at his left arm for the past 2 days. He had received pneumonia vaccine Tuesday morning at his left upper arm concern for possible reaction from the vaccine. States his arm is sore when he lays on it. Denies any fever but states has felt chilled and decreased appetite. He is eating and drinking, states he ate a piece of cake and 2 glasses of milk today so far. No nausea or vomiting. No abdominal pain. No chest pain. Denies any new trouble breathing, states he always has a little shortness of breath from his COPD, no change. Symptoms were gradual onset and worsening over the past 2 days spreading redness. Wife states they were going to go into the primary care office today but by the time they had spoke with them the doctor had already left for the day so was advised to come to the ER for evaluation. Nursing Notes were reviewed     REVIEW OF SYSTEMS    (2-9 systems for level 4, 10 or more for level 5)     Review of Systems   Constitutional: Positive for chills. Negative for fever. HENT: Negative for sore throat, trouble swallowing and voice change. Cardiovascular: Negative for chest pain. Non-plain film images such as CT, Ultrasound and MRI are read by the radiologist. Plain radiographic images are visualized andpreliminarily interpreted by the  ED Provider with the below findings:        Interpretation perthe Radiologist below, if available at the time of this note:    @[email or web address]@      PROCEDURES   Unless otherwise noted below, none     Procedures    CRITICAL CARE TIME   N/A    CONSULTS:  None      EMERGENCY DEPARTMENT COURSE and DIFFERENTIAL DIAGNOSIS/MDM:   Vitals:    Vitals:    08/22/19 1325   BP: 125/79   Pulse: 89   Resp: 16   Temp: 98.4 °F (36.9 °C)   TempSrc: Oral   SpO2: 95%   Weight: 250 lb (113.4 kg)   Height: 5' 11\" (1.803 m)       Patient was given thefollowing medications:  Medications   doxycycline hyclate (VIBRA-TABS) tablet 100 mg (has no administration in time range)       Patient presenting with redness at his left arm following an injection 2 days ago, there is no swelling or induration over the injection site and he is not having any allergic type symptoms, no rash or hives, no itching or swelling and denies trouble breathing or swallowing. He has spreading redness distally from injection site consistent with cellulitis, plan for treatment with antibiotics. He requests no IV, states he is really not feeling too bad and he only wants treated with a pill. I think this is reasonable at this point, afebrile, no vomiting, not tachycardic, is eating and drinking. He is started on doxycycline given dose here and prescription for same. Advised him to have recheck with his doctor on Monday and strict return precautions to the ER for any worsening symptoms increased pain, swelling, spreading redness or if he develops any fevers or vomiting return immediately to the ER. Patient and wife understand and agree. I estimate there is LOW risk for SEVERE CELLULITIS, SEPSIS OR NECROTIZING FASCIITIS,  thus I consider the discharge disposition reasonable. FINAL IMPRESSION      1.  Cellulitis

## 2019-10-02 ENCOUNTER — OFFICE VISIT (OUTPATIENT)
Dept: FAMILY MEDICINE CLINIC | Age: 69
End: 2019-10-02
Payer: MEDICARE

## 2019-10-02 VITALS
TEMPERATURE: 97.8 F | BODY MASS INDEX: 34.73 KG/M2 | HEART RATE: 81 BPM | OXYGEN SATURATION: 97 % | WEIGHT: 249 LBS | DIASTOLIC BLOOD PRESSURE: 78 MMHG | SYSTOLIC BLOOD PRESSURE: 128 MMHG

## 2019-10-02 DIAGNOSIS — Z23 NEED FOR INFLUENZA VACCINATION: ICD-10-CM

## 2019-10-02 DIAGNOSIS — F41.9 ANXIETY: ICD-10-CM

## 2019-10-02 DIAGNOSIS — E78.5 HYPERLIPIDEMIA WITH TARGET LDL LESS THAN 70: ICD-10-CM

## 2019-10-02 DIAGNOSIS — I10 ESSENTIAL HYPERTENSION: Primary | ICD-10-CM

## 2019-10-02 DIAGNOSIS — T88.7XXA DRUG SIDE EFFECTS: ICD-10-CM

## 2019-10-02 DIAGNOSIS — J44.9 CHRONIC OBSTRUCTIVE PULMONARY DISEASE, UNSPECIFIED COPD TYPE (HCC): ICD-10-CM

## 2019-10-02 DIAGNOSIS — M1A.9XX0 CHRONIC GOUT WITHOUT TOPHUS, UNSPECIFIED CAUSE, UNSPECIFIED SITE: ICD-10-CM

## 2019-10-02 LAB
A/G RATIO: 1.4 (ref 1.1–2.2)
ALBUMIN SERPL-MCNC: 4.3 G/DL (ref 3.4–5)
ALP BLD-CCNC: 103 U/L (ref 40–129)
ALT SERPL-CCNC: 22 U/L (ref 10–40)
ANION GAP SERPL CALCULATED.3IONS-SCNC: 16 MMOL/L (ref 3–16)
AST SERPL-CCNC: 38 U/L (ref 15–37)
BILIRUB SERPL-MCNC: 1.2 MG/DL (ref 0–1)
BUN BLDV-MCNC: 10 MG/DL (ref 7–20)
CALCIUM SERPL-MCNC: 8.7 MG/DL (ref 8.3–10.6)
CHLORIDE BLD-SCNC: 98 MMOL/L (ref 99–110)
CHOLESTEROL, TOTAL: 136 MG/DL (ref 0–199)
CO2: 28 MMOL/L (ref 21–32)
CREAT SERPL-MCNC: 0.9 MG/DL (ref 0.8–1.3)
GFR AFRICAN AMERICAN: >60
GFR NON-AFRICAN AMERICAN: >60
GLOBULIN: 3 G/DL
GLUCOSE BLD-MCNC: 99 MG/DL (ref 70–99)
HCT VFR BLD CALC: 38.6 % (ref 40.5–52.5)
HDLC SERPL-MCNC: 68 MG/DL (ref 40–60)
HEMOGLOBIN: 12.2 G/DL (ref 13.5–17.5)
LDL CHOLESTEROL CALCULATED: 57 MG/DL
MCH RBC QN AUTO: 24.9 PG (ref 26–34)
MCHC RBC AUTO-ENTMCNC: 31.5 G/DL (ref 31–36)
MCV RBC AUTO: 79.1 FL (ref 80–100)
PDW BLD-RTO: 19.8 % (ref 12.4–15.4)
PLATELET # BLD: 146 K/UL (ref 135–450)
PMV BLD AUTO: 9.2 FL (ref 5–10.5)
POTASSIUM SERPL-SCNC: 4.3 MMOL/L (ref 3.5–5.1)
RBC # BLD: 4.89 M/UL (ref 4.2–5.9)
SODIUM BLD-SCNC: 142 MMOL/L (ref 136–145)
TOTAL PROTEIN: 7.3 G/DL (ref 6.4–8.2)
TRIGL SERPL-MCNC: 57 MG/DL (ref 0–150)
URIC ACID, SERUM: 4.4 MG/DL (ref 3.5–7.2)
VLDLC SERPL CALC-MCNC: 11 MG/DL
WBC # BLD: 6.5 K/UL (ref 4–11)

## 2019-10-02 PROCEDURE — 3017F COLORECTAL CA SCREEN DOC REV: CPT | Performed by: FAMILY MEDICINE

## 2019-10-02 PROCEDURE — G8598 ASA/ANTIPLAT THER USED: HCPCS | Performed by: FAMILY MEDICINE

## 2019-10-02 PROCEDURE — 4004F PT TOBACCO SCREEN RCVD TLK: CPT | Performed by: FAMILY MEDICINE

## 2019-10-02 PROCEDURE — 1123F ACP DISCUSS/DSCN MKR DOCD: CPT | Performed by: FAMILY MEDICINE

## 2019-10-02 PROCEDURE — 99214 OFFICE O/P EST MOD 30 MIN: CPT | Performed by: FAMILY MEDICINE

## 2019-10-02 PROCEDURE — 4040F PNEUMOC VAC/ADMIN/RCVD: CPT | Performed by: FAMILY MEDICINE

## 2019-10-02 PROCEDURE — 36415 COLL VENOUS BLD VENIPUNCTURE: CPT | Performed by: FAMILY MEDICINE

## 2019-10-02 PROCEDURE — G8926 SPIRO NO PERF OR DOC: HCPCS | Performed by: FAMILY MEDICINE

## 2019-10-02 PROCEDURE — 3023F SPIROM DOC REV: CPT | Performed by: FAMILY MEDICINE

## 2019-10-02 PROCEDURE — G8417 CALC BMI ABV UP PARAM F/U: HCPCS | Performed by: FAMILY MEDICINE

## 2019-10-02 PROCEDURE — G8427 DOCREV CUR MEDS BY ELIG CLIN: HCPCS | Performed by: FAMILY MEDICINE

## 2019-10-02 PROCEDURE — G8484 FLU IMMUNIZE NO ADMIN: HCPCS | Performed by: FAMILY MEDICINE

## 2019-10-02 RX ORDER — ALLOPURINOL 300 MG/1
TABLET ORAL
Qty: 90 TABLET | Refills: 1 | Status: SHIPPED | OUTPATIENT
Start: 2019-10-02 | End: 2020-03-16 | Stop reason: SDUPTHER

## 2019-10-02 RX ORDER — ROSUVASTATIN CALCIUM 10 MG/1
TABLET, COATED ORAL
Qty: 30 TABLET | Refills: 5 | Status: SHIPPED | OUTPATIENT
Start: 2019-10-02 | End: 2020-02-07 | Stop reason: SDUPTHER

## 2019-10-02 RX ORDER — IPRATROPIUM BROMIDE AND ALBUTEROL SULFATE 2.5; .5 MG/3ML; MG/3ML
1 SOLUTION RESPIRATORY (INHALATION) EVERY 4 HOURS
Qty: 360 ML | Refills: 2 | Status: SHIPPED | OUTPATIENT
Start: 2019-10-02 | End: 2020-03-16 | Stop reason: SDUPTHER

## 2019-10-02 RX ORDER — POTASSIUM CHLORIDE 20 MEQ/1
TABLET, EXTENDED RELEASE ORAL
Qty: 60 TABLET | Refills: 3 | Status: SHIPPED | OUTPATIENT
Start: 2019-10-02 | End: 2020-01-27

## 2019-10-02 RX ORDER — METOPROLOL TARTRATE 100 MG/1
TABLET ORAL
Qty: 180 TABLET | Refills: 1 | Status: SHIPPED | OUTPATIENT
Start: 2019-10-02 | End: 2020-02-07 | Stop reason: SDUPTHER

## 2019-10-02 RX ORDER — BUSPIRONE HYDROCHLORIDE 15 MG/1
TABLET ORAL
Qty: 40 TABLET | Refills: 5 | Status: SHIPPED | OUTPATIENT
Start: 2019-10-02 | End: 2020-02-07 | Stop reason: SDUPTHER

## 2019-10-02 RX ORDER — CLONAZEPAM 2 MG/1
TABLET ORAL
Qty: 30 TABLET | Refills: 5 | Status: SHIPPED | OUTPATIENT
Start: 2019-10-02 | End: 2020-03-16 | Stop reason: SDUPTHER

## 2019-10-02 RX ORDER — FUROSEMIDE 40 MG/1
TABLET ORAL
Qty: 60 TABLET | Refills: 5 | Status: SHIPPED | OUTPATIENT
Start: 2019-10-02 | End: 2020-03-16 | Stop reason: SDUPTHER

## 2019-10-02 RX ORDER — BUDESONIDE AND FORMOTEROL FUMARATE DIHYDRATE 160; 4.5 UG/1; UG/1
AEROSOL RESPIRATORY (INHALATION)
Qty: 11 INHALER | Refills: 2 | Status: SHIPPED | OUTPATIENT
Start: 2019-10-02 | End: 2020-03-16 | Stop reason: SDUPTHER

## 2019-10-02 RX ORDER — FOLIC ACID 1 MG/1
TABLET ORAL
Qty: 90 TABLET | Refills: 1 | Status: SHIPPED | OUTPATIENT
Start: 2019-10-02 | End: 2020-02-07 | Stop reason: SDUPTHER

## 2019-10-02 RX ORDER — ALBUTEROL SULFATE 90 UG/1
AEROSOL, METERED RESPIRATORY (INHALATION)
Qty: 1 INHALER | Refills: 5 | Status: SHIPPED | OUTPATIENT
Start: 2019-10-02 | End: 2020-03-16 | Stop reason: SDUPTHER

## 2019-10-02 ASSESSMENT — ENCOUNTER SYMPTOMS: SHORTNESS OF BREATH: 1

## 2019-10-04 ENCOUNTER — HOSPITAL ENCOUNTER (OUTPATIENT)
Age: 69
Discharge: HOME | End: 2019-10-04
Payer: MEDICARE

## 2019-10-04 ENCOUNTER — TELEPHONE (OUTPATIENT)
Dept: FAMILY MEDICINE CLINIC | Age: 69
End: 2019-10-04

## 2019-10-04 ENCOUNTER — OFFICE VISIT (OUTPATIENT)
Dept: FAMILY MEDICINE CLINIC | Age: 69
End: 2019-10-04
Payer: MEDICARE

## 2019-10-04 VITALS
HEIGHT: 71 IN | WEIGHT: 250 LBS | HEART RATE: 80 BPM | OXYGEN SATURATION: 97 % | BODY MASS INDEX: 35 KG/M2 | DIASTOLIC BLOOD PRESSURE: 72 MMHG | SYSTOLIC BLOOD PRESSURE: 132 MMHG

## 2019-10-04 DIAGNOSIS — I48.0 PAROXYSMAL ATRIAL FIBRILLATION (HCC): ICD-10-CM

## 2019-10-04 DIAGNOSIS — D50.0 IRON DEFICIENCY ANEMIA DUE TO CHRONIC BLOOD LOSS: ICD-10-CM

## 2019-10-04 DIAGNOSIS — D50.0 IRON DEFICIENCY ANEMIA DUE TO CHRONIC BLOOD LOSS: Primary | ICD-10-CM

## 2019-10-04 LAB
APTT: 35.9 SEC (ref 26–36)
FERRITIN: 21.7 NG/ML (ref 30–400)
HCT VFR BLD CALC: 39.1 % (ref 40.5–52.5)
HEMOGLOBIN: 12.4 G/DL (ref 13.5–17.5)
INR BLD: 1.57 (ref 0.86–1.14)
MCH RBC QN AUTO: 24.9 PG (ref 26–34)
MCHC RBC AUTO-ENTMCNC: 31.8 G/DL (ref 31–36)
MCV RBC AUTO: 78.3 FL (ref 80–100)
PDW BLD-RTO: 19.3 % (ref 12.4–15.4)
PLATELET # BLD: 133 K/UL (ref 135–450)
PMV BLD AUTO: 8.4 FL (ref 5–10.5)
PROTHROMBIN TIME: 17.7 SEC (ref 9.8–13)
RBC # BLD: 4.99 M/UL (ref 4.2–5.9)
WBC # BLD: 5.9 K/UL (ref 4–11)

## 2019-10-04 PROCEDURE — G8417 CALC BMI ABV UP PARAM F/U: HCPCS | Performed by: FAMILY MEDICINE

## 2019-10-04 PROCEDURE — G8427 DOCREV CUR MEDS BY ELIG CLIN: HCPCS | Performed by: FAMILY MEDICINE

## 2019-10-04 PROCEDURE — 99214 OFFICE O/P EST MOD 30 MIN: CPT | Performed by: FAMILY MEDICINE

## 2019-10-04 PROCEDURE — 4004F PT TOBACCO SCREEN RCVD TLK: CPT | Performed by: FAMILY MEDICINE

## 2019-10-04 PROCEDURE — G8598 ASA/ANTIPLAT THER USED: HCPCS | Performed by: FAMILY MEDICINE

## 2019-10-04 PROCEDURE — 93000 ELECTROCARDIOGRAM COMPLETE: CPT | Performed by: FAMILY MEDICINE

## 2019-10-04 PROCEDURE — 85610 PROTHROMBIN TIME: CPT

## 2019-10-04 PROCEDURE — 3017F COLORECTAL CA SCREEN DOC REV: CPT | Performed by: FAMILY MEDICINE

## 2019-10-04 PROCEDURE — 36415 COLL VENOUS BLD VENIPUNCTURE: CPT

## 2019-10-04 PROCEDURE — 85027 COMPLETE CBC AUTOMATED: CPT

## 2019-10-04 PROCEDURE — 4040F PNEUMOC VAC/ADMIN/RCVD: CPT | Performed by: FAMILY MEDICINE

## 2019-10-04 PROCEDURE — 85730 THROMBOPLASTIN TIME PARTIAL: CPT

## 2019-10-04 PROCEDURE — G8484 FLU IMMUNIZE NO ADMIN: HCPCS | Performed by: FAMILY MEDICINE

## 2019-10-04 PROCEDURE — 1123F ACP DISCUSS/DSCN MKR DOCD: CPT | Performed by: FAMILY MEDICINE

## 2019-10-04 PROCEDURE — 82728 ASSAY OF FERRITIN: CPT

## 2019-10-04 ASSESSMENT — ENCOUNTER SYMPTOMS
CONSTIPATION: 0
ANAL BLEEDING: 0
NAUSEA: 0
BLOOD IN STOOL: 0
VOMITING: 0
ABDOMINAL PAIN: 0
DIARRHEA: 0
RESPIRATORY NEGATIVE: 1

## 2019-10-25 ENCOUNTER — HOSPITAL ENCOUNTER (OUTPATIENT)
Dept: ULTRASOUND IMAGING | Age: 69
Discharge: HOME | End: 2019-10-25
Payer: MEDICARE

## 2019-10-25 DIAGNOSIS — R79.89 ELEVATED LIVER FUNCTION TESTS: ICD-10-CM

## 2019-10-25 PROCEDURE — 76705 ECHO EXAM OF ABDOMEN: CPT

## 2019-12-05 ENCOUNTER — TELEPHONE (OUTPATIENT)
Dept: FAMILY MEDICINE CLINIC | Age: 69
End: 2019-12-05

## 2019-12-20 ENCOUNTER — OFFICE VISIT (OUTPATIENT)
Dept: FAMILY MEDICINE CLINIC | Age: 69
End: 2019-12-20
Payer: MEDICARE

## 2019-12-20 VITALS
SYSTOLIC BLOOD PRESSURE: 130 MMHG | HEART RATE: 68 BPM | DIASTOLIC BLOOD PRESSURE: 76 MMHG | OXYGEN SATURATION: 97 % | WEIGHT: 263 LBS | BODY MASS INDEX: 36.68 KG/M2

## 2019-12-20 DIAGNOSIS — R44.1 VISUAL HALLUCINATION: ICD-10-CM

## 2019-12-20 DIAGNOSIS — F19.931: Primary | ICD-10-CM

## 2019-12-20 DIAGNOSIS — Z11.59 NEED FOR HEPATITIS C SCREENING TEST: ICD-10-CM

## 2019-12-20 LAB
ALBUMIN SERPL-MCNC: 4.2 G/DL (ref 3.4–5)
ANION GAP SERPL CALCULATED.3IONS-SCNC: 17 MMOL/L (ref 3–16)
BUN BLDV-MCNC: 13 MG/DL (ref 7–20)
CALCIUM SERPL-MCNC: 8.9 MG/DL (ref 8.3–10.6)
CHLORIDE BLD-SCNC: 98 MMOL/L (ref 99–110)
CO2: 27 MMOL/L (ref 21–32)
CREAT SERPL-MCNC: 1 MG/DL (ref 0.8–1.3)
GFR AFRICAN AMERICAN: >60
GFR NON-AFRICAN AMERICAN: >60
GLUCOSE BLD-MCNC: 87 MG/DL (ref 70–99)
HEPATITIS C ANTIBODY INTERPRETATION: NORMAL
PHOSPHORUS: 3.5 MG/DL (ref 2.5–4.9)
POTASSIUM SERPL-SCNC: 4.1 MMOL/L (ref 3.5–5.1)
SODIUM BLD-SCNC: 142 MMOL/L (ref 136–145)

## 2019-12-20 PROCEDURE — G8417 CALC BMI ABV UP PARAM F/U: HCPCS | Performed by: FAMILY MEDICINE

## 2019-12-20 PROCEDURE — 4004F PT TOBACCO SCREEN RCVD TLK: CPT | Performed by: FAMILY MEDICINE

## 2019-12-20 PROCEDURE — G8427 DOCREV CUR MEDS BY ELIG CLIN: HCPCS | Performed by: FAMILY MEDICINE

## 2019-12-20 PROCEDURE — 99215 OFFICE O/P EST HI 40 MIN: CPT | Performed by: FAMILY MEDICINE

## 2019-12-20 PROCEDURE — 3017F COLORECTAL CA SCREEN DOC REV: CPT | Performed by: FAMILY MEDICINE

## 2019-12-20 PROCEDURE — G8484 FLU IMMUNIZE NO ADMIN: HCPCS | Performed by: FAMILY MEDICINE

## 2019-12-20 PROCEDURE — G8598 ASA/ANTIPLAT THER USED: HCPCS | Performed by: FAMILY MEDICINE

## 2019-12-20 PROCEDURE — 1123F ACP DISCUSS/DSCN MKR DOCD: CPT | Performed by: FAMILY MEDICINE

## 2019-12-20 PROCEDURE — 36415 COLL VENOUS BLD VENIPUNCTURE: CPT | Performed by: FAMILY MEDICINE

## 2019-12-20 PROCEDURE — 4040F PNEUMOC VAC/ADMIN/RCVD: CPT | Performed by: FAMILY MEDICINE

## 2020-01-07 ENCOUNTER — OFFICE VISIT (OUTPATIENT)
Dept: FAMILY MEDICINE CLINIC | Age: 70
End: 2020-01-07
Payer: MEDICARE

## 2020-01-07 VITALS
HEART RATE: 75 BPM | OXYGEN SATURATION: 97 % | DIASTOLIC BLOOD PRESSURE: 74 MMHG | WEIGHT: 262 LBS | SYSTOLIC BLOOD PRESSURE: 128 MMHG | BODY MASS INDEX: 36.54 KG/M2

## 2020-01-07 PROCEDURE — 1123F ACP DISCUSS/DSCN MKR DOCD: CPT | Performed by: FAMILY MEDICINE

## 2020-01-07 PROCEDURE — 99213 OFFICE O/P EST LOW 20 MIN: CPT | Performed by: FAMILY MEDICINE

## 2020-01-07 PROCEDURE — 4040F PNEUMOC VAC/ADMIN/RCVD: CPT | Performed by: FAMILY MEDICINE

## 2020-01-07 PROCEDURE — G8417 CALC BMI ABV UP PARAM F/U: HCPCS | Performed by: FAMILY MEDICINE

## 2020-01-07 PROCEDURE — G8484 FLU IMMUNIZE NO ADMIN: HCPCS | Performed by: FAMILY MEDICINE

## 2020-01-07 PROCEDURE — 4004F PT TOBACCO SCREEN RCVD TLK: CPT | Performed by: FAMILY MEDICINE

## 2020-01-07 PROCEDURE — G8427 DOCREV CUR MEDS BY ELIG CLIN: HCPCS | Performed by: FAMILY MEDICINE

## 2020-01-07 PROCEDURE — 3017F COLORECTAL CA SCREEN DOC REV: CPT | Performed by: FAMILY MEDICINE

## 2020-01-07 NOTE — PROGRESS NOTES
visit:    Caitlin Jones was seen today for follow-up, anxiety, insomnia and alcohol problem. Diagnoses and all orders for this visit:    Delirium in remission    Delirium due to multiple etiologies-alcohol plus benzodiazepine withdrawal              Plan:      There are no Patient Instructions on file for this visit. No follow-ups on file.          Natalie Mcintyre MA

## 2020-01-12 ASSESSMENT — ENCOUNTER SYMPTOMS: RESPIRATORY NEGATIVE: 1

## 2020-01-27 RX ORDER — POTASSIUM CHLORIDE 20 MEQ/1
TABLET, EXTENDED RELEASE ORAL
Qty: 180 TABLET | Refills: 0 | Status: SHIPPED | OUTPATIENT
Start: 2020-01-27 | End: 2020-02-07 | Stop reason: SDUPTHER

## 2020-02-07 ENCOUNTER — HOSPITAL ENCOUNTER (OUTPATIENT)
Age: 70
Discharge: HOME | End: 2020-02-07
Payer: MEDICARE

## 2020-02-07 ENCOUNTER — HOSPITAL ENCOUNTER (OUTPATIENT)
Dept: GENERAL RADIOLOGY | Age: 70
Discharge: HOME | End: 2020-02-07
Payer: MEDICARE

## 2020-02-07 ENCOUNTER — OFFICE VISIT (OUTPATIENT)
Dept: FAMILY MEDICINE CLINIC | Age: 70
End: 2020-02-07
Payer: MEDICARE

## 2020-02-07 VITALS
HEIGHT: 71 IN | SYSTOLIC BLOOD PRESSURE: 110 MMHG | WEIGHT: 269 LBS | BODY MASS INDEX: 37.66 KG/M2 | DIASTOLIC BLOOD PRESSURE: 60 MMHG | OXYGEN SATURATION: 96 % | HEART RATE: 78 BPM

## 2020-02-07 LAB
A/G RATIO: 1.4 (ref 1.1–2.2)
ALBUMIN SERPL-MCNC: 4.4 G/DL (ref 3.4–5)
ALP BLD-CCNC: 98 U/L (ref 40–129)
ALT SERPL-CCNC: 20 U/L (ref 10–40)
ANION GAP SERPL CALCULATED.3IONS-SCNC: 13 MMOL/L (ref 3–16)
AST SERPL-CCNC: 38 U/L (ref 15–37)
BILIRUB SERPL-MCNC: 1 MG/DL (ref 0–1)
BUN BLDV-MCNC: 11 MG/DL (ref 7–20)
CALCIUM SERPL-MCNC: 9.2 MG/DL (ref 8.3–10.6)
CHLORIDE BLD-SCNC: 95 MMOL/L (ref 99–110)
CO2: 32 MMOL/L (ref 21–32)
CREAT SERPL-MCNC: 0.9 MG/DL (ref 0.8–1.3)
GFR AFRICAN AMERICAN: >60
GFR NON-AFRICAN AMERICAN: >60
GLOBULIN: 3.1 G/DL
GLUCOSE BLD-MCNC: 93 MG/DL (ref 70–99)
HCT VFR BLD CALC: 36.1 % (ref 40.5–52.5)
HEMOGLOBIN: 11.4 G/DL (ref 13.5–17.5)
INR BLD: 1.77 (ref 0.86–1.14)
MCH RBC QN AUTO: 24.3 PG (ref 26–34)
MCHC RBC AUTO-ENTMCNC: 31.5 G/DL (ref 31–36)
MCV RBC AUTO: 77.1 FL (ref 80–100)
PDW BLD-RTO: 19.4 % (ref 12.4–15.4)
PLATELET # BLD: 123 K/UL (ref 135–450)
PMV BLD AUTO: 9.1 FL (ref 5–10.5)
POTASSIUM SERPL-SCNC: 3.8 MMOL/L (ref 3.5–5.1)
PROTHROMBIN TIME: 20.7 SEC (ref 10–13.2)
RBC # BLD: 4.68 M/UL (ref 4.2–5.9)
SODIUM BLD-SCNC: 140 MMOL/L (ref 136–145)
TOTAL PROTEIN: 7.5 G/DL (ref 6.4–8.2)
WBC # BLD: 5.5 K/UL (ref 4–11)

## 2020-02-07 PROCEDURE — 3017F COLORECTAL CA SCREEN DOC REV: CPT | Performed by: FAMILY MEDICINE

## 2020-02-07 PROCEDURE — 36415 COLL VENOUS BLD VENIPUNCTURE: CPT | Performed by: FAMILY MEDICINE

## 2020-02-07 PROCEDURE — 71046 X-RAY EXAM CHEST 2 VIEWS: CPT

## 2020-02-07 PROCEDURE — 1123F ACP DISCUSS/DSCN MKR DOCD: CPT | Performed by: FAMILY MEDICINE

## 2020-02-07 PROCEDURE — G8417 CALC BMI ABV UP PARAM F/U: HCPCS | Performed by: FAMILY MEDICINE

## 2020-02-07 PROCEDURE — G8484 FLU IMMUNIZE NO ADMIN: HCPCS | Performed by: FAMILY MEDICINE

## 2020-02-07 PROCEDURE — 4040F PNEUMOC VAC/ADMIN/RCVD: CPT | Performed by: FAMILY MEDICINE

## 2020-02-07 PROCEDURE — 99215 OFFICE O/P EST HI 40 MIN: CPT | Performed by: FAMILY MEDICINE

## 2020-02-07 PROCEDURE — 4004F PT TOBACCO SCREEN RCVD TLK: CPT | Performed by: FAMILY MEDICINE

## 2020-02-07 PROCEDURE — G8427 DOCREV CUR MEDS BY ELIG CLIN: HCPCS | Performed by: FAMILY MEDICINE

## 2020-02-07 RX ORDER — POTASSIUM CHLORIDE 20 MEQ/1
20 TABLET, EXTENDED RELEASE ORAL 2 TIMES DAILY
Qty: 180 TABLET | Refills: 1 | Status: SHIPPED
Start: 2020-02-07 | End: 2020-02-23 | Stop reason: SINTOL

## 2020-02-07 RX ORDER — FOLIC ACID 1 MG/1
TABLET ORAL
Qty: 90 TABLET | Refills: 1 | Status: SHIPPED | OUTPATIENT
Start: 2020-02-07 | End: 2020-10-06

## 2020-02-07 RX ORDER — ACETAMINOPHEN 160 MG
1 TABLET,DISINTEGRATING ORAL DAILY
COMMUNITY

## 2020-02-07 RX ORDER — VITAMIN B COMPLEX
1 TABLET ORAL DAILY
COMMUNITY

## 2020-02-07 RX ORDER — ROSUVASTATIN CALCIUM 10 MG/1
TABLET, COATED ORAL
Qty: 30 TABLET | Refills: 5 | Status: SHIPPED | OUTPATIENT
Start: 2020-02-07 | End: 2020-10-06 | Stop reason: SDUPTHER

## 2020-02-07 RX ORDER — BUSPIRONE HYDROCHLORIDE 15 MG/1
TABLET ORAL
Qty: 40 TABLET | Refills: 5 | Status: SHIPPED | OUTPATIENT
Start: 2020-02-07 | End: 2020-10-06 | Stop reason: SDUPTHER

## 2020-02-07 RX ORDER — METOPROLOL TARTRATE 100 MG/1
TABLET ORAL
Qty: 180 TABLET | Refills: 1 | Status: SHIPPED | OUTPATIENT
Start: 2020-02-07 | End: 2020-10-06 | Stop reason: SDUPTHER

## 2020-02-07 RX ORDER — SPIRONOLACTONE 25 MG/1
25 TABLET ORAL 2 TIMES DAILY
Qty: 30 TABLET | Refills: 0 | Status: SHIPPED | OUTPATIENT
Start: 2020-02-07 | End: 2020-04-08

## 2020-02-07 ASSESSMENT — ENCOUNTER SYMPTOMS
DIARRHEA: 0
SHORTNESS OF BREATH: 1
ANAL BLEEDING: 0
VOMITING: 0
ABDOMINAL PAIN: 0
CONSTIPATION: 0
ABDOMINAL DISTENTION: 1
BLOOD IN STOOL: 0

## 2020-02-07 NOTE — PROGRESS NOTES
Subjective:      Patient ID: Tere Anderson is a 71 y.o. male. HPI  Chief Complaint   Patient presents with    Joint Swelling     feet and ankle swelling, started 2 wks ago and getting worse     Insomnia     states he can not sleep because when he lays down he can not breath      Chief complaint present illness: 28-year-old white male with history of COPD, coronary artery disease and alcoholic liver disease presents unaccompanied with a 2-week history of bilateral increasing edema in his lower extremities as well as orthopnea so that he is sleeping in a chair or lazy boy with his legs down, dyspnea with low levels of exertion and abdominal bloating. Patient has continued to take his furosemide 40 twice daily and all other medicines. Patient brought all his medicines with them and we reviewed them. Patient has no prior history of heart failure. Denies any fever sweats chills and does not feel like he is wheezing. He continues to drink at least 6 drinks a day but who knows for sure    Review of Systems   Constitutional: Positive for activity change, fatigue and unexpected weight change. HENT: Negative. Negative for nosebleeds. Respiratory: Positive for shortness of breath. Cardiovascular: Positive for leg swelling. Negative for chest pain and palpitations. History of chronic atrial flutter   Gastrointestinal: Positive for abdominal distention. Negative for abdominal pain, anal bleeding, blood in stool, constipation, diarrhea and vomiting. Endocrine: Negative. Genitourinary: Negative. Negative for hematuria. Neurological: Negative. Hematological: Bruises/bleeds easily (On Eliquis). background/entire past medical,social and family history obtained and reviewed/updated today   Objective:   Physical Exam  Vitals signs reviewed. Constitutional:       General: He is not in acute distress. Appearance: He is obese. He is not ill-appearing or diaphoretic.    HENT:      Head: STANDARD (2 VW)  -     spironolactone (ALDACTONE) 25 MG tablet; Take 1 tablet by mouth 2 times daily for 15 days    Dyspnea on exertion  -     Comprehensive Metabolic Panel  -     CBC    Other orders  -     potassium chloride (KLOR-CON M) 20 MEQ extended release tablet;  Take 1 tablet by mouth 2 times daily           Plan:        Patient Instructions   Increase furosemide to 80 mgm twice daily(2 pills twice a daily)    F/u depending on test results                Yash Zarco MA

## 2020-02-10 ENCOUNTER — TELEPHONE (OUTPATIENT)
Dept: FAMILY MEDICINE CLINIC | Age: 70
End: 2020-02-10

## 2020-02-12 ENCOUNTER — OFFICE VISIT (OUTPATIENT)
Dept: FAMILY MEDICINE CLINIC | Age: 70
End: 2020-02-12
Payer: MEDICARE

## 2020-02-12 VITALS
HEIGHT: 71 IN | DIASTOLIC BLOOD PRESSURE: 82 MMHG | SYSTOLIC BLOOD PRESSURE: 122 MMHG | HEART RATE: 80 BPM | WEIGHT: 255 LBS | BODY MASS INDEX: 35.7 KG/M2 | OXYGEN SATURATION: 98 %

## 2020-02-12 LAB
ALBUMIN SERPL-MCNC: 4.5 G/DL (ref 3.4–5)
ANION GAP SERPL CALCULATED.3IONS-SCNC: 20 MMOL/L (ref 3–16)
BUN BLDV-MCNC: 17 MG/DL (ref 7–20)
CALCIUM SERPL-MCNC: 10 MG/DL (ref 8.3–10.6)
CHLORIDE BLD-SCNC: 95 MMOL/L (ref 99–110)
CO2: 30 MMOL/L (ref 21–32)
CREAT SERPL-MCNC: 1.2 MG/DL (ref 0.8–1.3)
GFR AFRICAN AMERICAN: >60
GFR NON-AFRICAN AMERICAN: 60
GLUCOSE BLD-MCNC: 95 MG/DL (ref 70–99)
PHOSPHORUS: 3.9 MG/DL (ref 2.5–4.9)
POTASSIUM SERPL-SCNC: 4.5 MMOL/L (ref 3.5–5.1)
SODIUM BLD-SCNC: 145 MMOL/L (ref 136–145)

## 2020-02-12 PROCEDURE — 4040F PNEUMOC VAC/ADMIN/RCVD: CPT | Performed by: FAMILY MEDICINE

## 2020-02-12 PROCEDURE — 36415 COLL VENOUS BLD VENIPUNCTURE: CPT | Performed by: FAMILY MEDICINE

## 2020-02-12 PROCEDURE — G8427 DOCREV CUR MEDS BY ELIG CLIN: HCPCS | Performed by: FAMILY MEDICINE

## 2020-02-12 PROCEDURE — G8484 FLU IMMUNIZE NO ADMIN: HCPCS | Performed by: FAMILY MEDICINE

## 2020-02-12 PROCEDURE — 3017F COLORECTAL CA SCREEN DOC REV: CPT | Performed by: FAMILY MEDICINE

## 2020-02-12 PROCEDURE — G8417 CALC BMI ABV UP PARAM F/U: HCPCS | Performed by: FAMILY MEDICINE

## 2020-02-12 PROCEDURE — 4004F PT TOBACCO SCREEN RCVD TLK: CPT | Performed by: FAMILY MEDICINE

## 2020-02-12 PROCEDURE — 99214 OFFICE O/P EST MOD 30 MIN: CPT | Performed by: FAMILY MEDICINE

## 2020-02-12 PROCEDURE — 1123F ACP DISCUSS/DSCN MKR DOCD: CPT | Performed by: FAMILY MEDICINE

## 2020-02-12 RX ORDER — FERROUS SULFATE 325(65) MG
325 TABLET ORAL
Qty: 30 TABLET | Refills: 5 | Status: SHIPPED | OUTPATIENT
Start: 2020-02-12 | End: 2020-08-17 | Stop reason: SDUPTHER

## 2020-02-12 NOTE — PROGRESS NOTES
anemia and edema. Diagnoses and all orders for this visit:    Essential hypertension  -     Renal Function Panel    Ascites due to alcoholic hepatitis  -     Renal Function Panel    Acute congestive heart failure, unspecified heart failure type (HCC)  -     Renal Function Panel    Iron deficiency anemia due to chronic blood loss  -     ferrous sulfate 325 (65 Fe) MG tablet;  Take 1 tablet by mouth daily (with breakfast)           Plan:        Patient Instructions   Decrease the furosemide 40 to one tablet twice daily    We will call about the spironalactone    May need an echocardiogram    We restarted the iron at one a day            Ruslan Souza MA

## 2020-02-24 RX ORDER — SPIRONOLACTONE 25 MG/1
25 TABLET ORAL 2 TIMES DAILY
Qty: 60 TABLET | Refills: 1 | Status: SHIPPED | OUTPATIENT
Start: 2020-02-24 | End: 2020-04-08

## 2020-03-11 ENCOUNTER — TELEPHONE (OUTPATIENT)
Dept: FAMILY MEDICINE CLINIC | Age: 70
End: 2020-03-11

## 2020-03-11 RX ORDER — MOMETASONE FUROATE AND FORMOTEROL FUMARATE DIHYDRATE 50; 5 UG/1; UG/1
1 AEROSOL RESPIRATORY (INHALATION) 2 TIMES DAILY
Qty: 11 INHALER | Refills: 2 | Status: SHIPPED | OUTPATIENT
Start: 2020-03-11 | End: 2020-04-08

## 2020-03-16 RX ORDER — FUROSEMIDE 40 MG/1
TABLET ORAL
Qty: 60 TABLET | Refills: 5 | Status: SHIPPED | OUTPATIENT
Start: 2020-03-16 | End: 2020-10-06 | Stop reason: SDUPTHER

## 2020-03-16 RX ORDER — IPRATROPIUM BROMIDE AND ALBUTEROL SULFATE 2.5; .5 MG/3ML; MG/3ML
1 SOLUTION RESPIRATORY (INHALATION) EVERY 4 HOURS
Qty: 360 ML | Refills: 2 | Status: SHIPPED | OUTPATIENT
Start: 2020-03-16

## 2020-03-16 RX ORDER — FESOTERODINE FUMARATE 8 MG/1
1 TABLET, FILM COATED, EXTENDED RELEASE ORAL DAILY
Qty: 30 TABLET | Refills: 11 | Status: SHIPPED | OUTPATIENT
Start: 2020-03-16 | End: 2021-06-22 | Stop reason: ALTCHOICE

## 2020-03-16 RX ORDER — FESOTERODINE FUMARATE 8 MG/1
1 TABLET, FILM COATED, EXTENDED RELEASE ORAL DAILY
Qty: 30 TABLET | Refills: 11 | Status: CANCELLED | OUTPATIENT
Start: 2020-03-16

## 2020-03-16 RX ORDER — ALBUTEROL SULFATE 90 UG/1
AEROSOL, METERED RESPIRATORY (INHALATION)
Qty: 1 INHALER | Refills: 5 | Status: CANCELLED | OUTPATIENT
Start: 2020-03-16

## 2020-03-16 RX ORDER — FUROSEMIDE 40 MG/1
TABLET ORAL
Qty: 60 TABLET | Refills: 5 | Status: CANCELLED | OUTPATIENT
Start: 2020-03-16

## 2020-03-16 RX ORDER — CLONAZEPAM 2 MG/1
TABLET ORAL
Qty: 30 TABLET | Refills: 5 | Status: SHIPPED | OUTPATIENT
Start: 2020-03-16 | End: 2021-06-11 | Stop reason: ALTCHOICE

## 2020-03-16 RX ORDER — SPIRONOLACTONE 25 MG/1
25 TABLET ORAL 2 TIMES DAILY
Qty: 60 TABLET | Refills: 1 | Status: CANCELLED | OUTPATIENT
Start: 2020-03-16

## 2020-03-16 RX ORDER — IPRATROPIUM BROMIDE AND ALBUTEROL SULFATE 2.5; .5 MG/3ML; MG/3ML
1 SOLUTION RESPIRATORY (INHALATION) EVERY 4 HOURS
Qty: 360 ML | Refills: 2 | Status: CANCELLED | OUTPATIENT
Start: 2020-03-16

## 2020-03-16 RX ORDER — ALLOPURINOL 300 MG/1
TABLET ORAL
Qty: 90 TABLET | Refills: 1 | Status: SHIPPED | OUTPATIENT
Start: 2020-03-16 | End: 2020-06-01 | Stop reason: SDUPTHER

## 2020-03-16 RX ORDER — BUDESONIDE AND FORMOTEROL FUMARATE DIHYDRATE 160; 4.5 UG/1; UG/1
AEROSOL RESPIRATORY (INHALATION)
Qty: 11 INHALER | Refills: 2 | Status: SHIPPED | OUTPATIENT
Start: 2020-03-16 | End: 2020-10-06 | Stop reason: SDUPTHER

## 2020-03-16 RX ORDER — ALBUTEROL SULFATE 90 UG/1
AEROSOL, METERED RESPIRATORY (INHALATION)
Qty: 1 INHALER | Refills: 5 | Status: SHIPPED | OUTPATIENT
Start: 2020-03-16 | End: 2021-04-22

## 2020-03-16 RX ORDER — BUDESONIDE AND FORMOTEROL FUMARATE DIHYDRATE 160; 4.5 UG/1; UG/1
AEROSOL RESPIRATORY (INHALATION)
Qty: 11 INHALER | Refills: 2 | Status: CANCELLED | OUTPATIENT
Start: 2020-03-16

## 2020-03-16 RX ORDER — ALLOPURINOL 300 MG/1
TABLET ORAL
Qty: 90 TABLET | Refills: 1 | Status: CANCELLED | OUTPATIENT
Start: 2020-03-16

## 2020-03-16 RX ORDER — CLONAZEPAM 2 MG/1
TABLET ORAL
Qty: 30 TABLET | Refills: 5 | Status: CANCELLED | OUTPATIENT
Start: 2020-03-16 | End: 2021-08-25

## 2020-04-08 ENCOUNTER — VIRTUAL VISIT (OUTPATIENT)
Dept: FAMILY MEDICINE CLINIC | Age: 70
End: 2020-04-08
Payer: MEDICARE

## 2020-04-08 PROBLEM — J18.9 LLL PNEUMONIA: Status: RESOLVED | Noted: 2017-11-20 | Resolved: 2020-04-08

## 2020-04-08 PROCEDURE — 99442 PR PHYS/QHP TELEPHONE EVALUATION 11-20 MIN: CPT | Performed by: FAMILY MEDICINE

## 2020-04-08 ASSESSMENT — ENCOUNTER SYMPTOMS
DIFFICULTY BREATHING: 1
SHORTNESS OF BREATH: 1
DIARRHEA: 0
CONSTIPATION: 0

## 2020-04-08 ASSESSMENT — PATIENT HEALTH QUESTIONNAIRE - PHQ9
1. LITTLE INTEREST OR PLEASURE IN DOING THINGS: 0
SUM OF ALL RESPONSES TO PHQ QUESTIONS 1-9: 0
SUM OF ALL RESPONSES TO PHQ9 QUESTIONS 1 & 2: 0
2. FEELING DOWN, DEPRESSED OR HOPELESS: 0
SUM OF ALL RESPONSES TO PHQ QUESTIONS 1-9: 0

## 2020-04-08 ASSESSMENT — COPD QUESTIONNAIRES: COPD: 1

## 2020-04-08 NOTE — PROGRESS NOTES
Aleyda Squires is a 71 y.o. male evaluated via telephone on 4/8/2020. Consent:  He and/or health care decision maker is aware that that he may receive a bill for this telephone service, depending on his insurance coverage, and has provided verbal consent to proceed: Yes    Hypertension   This is a chronic problem. The current episode started more than 1 year ago. The problem is controlled. Associated symptoms include shortness of breath. Pertinent negatives include no chest pain or palpitations. Risk factors for coronary artery disease include dyslipidemia. Past treatments include beta blockers and diuretics. The current treatment provides moderate improvement. Hyperlipidemia   This is a chronic problem. The current episode started more than 1 year ago. The problem is controlled. Factors aggravating his hyperlipidemia include beta blockers. Associated symptoms include shortness of breath. Pertinent negatives include no chest pain. Current antihyperlipidemic treatment includes statins. Risk factors for coronary artery disease include male sex, hypertension and dyslipidemia. COPD   He complains of difficulty breathing (near baseline) and shortness of breath. This is a chronic problem. The problem has been unchanged. Pertinent negatives include no chest pain or fever. His symptoms are aggravated by exercise. His symptoms are alleviated by beta-agonist and steroid inhaler. His past medical history is significant for COPD. Hx of chf.  tony meds well. Hx of afib. On eliquis. No changes per cardio. Overall feeling fairly well. Has sig cut back on etoh. Currently approx 5-6 drinks per day. Hx of anemia. On iron. Still on eliquis as well. Had been walking a good bit. Developed plantar fasciitis. Seen by podiatry. Improved. Swelling overall improved. Had gained weight, seemed to be fluid. Improved w/ diuretics. Has not been using cpap for some time.        Review of Systems Constitutional: Negative for fever. Respiratory: Positive for shortness of breath. Cardiovascular: Negative for chest pain, palpitations and leg swelling. Gastrointestinal: Negative for constipation and diarrhea. Documentation:  I communicated with the patient and/or health care decision maker about. See below  Details of this discussion including any medical advice provided: see below. 1. Essential hypertension  tony meds. No new issues  - Comprehensive Metabolic Panel; Future    2. Persistent atrial fibrillation  Denies sxs. Cont eliquis. F/u w/ cardio as sched    3. Other emphysema (HCC)  Cont meds. No new issues. Seems near baseline    4. Coronary artery disease involving native coronary artery of native heart without angina pectoris  Reviewed prior cardio note. F/u as sched. No new sxs    5. LINSEY (non-compliant with home CPAP/BiPAP)  Has not used machine in yrs. 6. Iron deficiency anemia due to chronic blood loss  Rpt labs w/ next draw. Hx of liver issues. Discussed need to minimize/eliminate etoh use. Prior inc in INR.    - CBC Auto Differential; Future  - PROTIME-INR; Future    7. Hyperlipidemia with target LDL less than 70  Rpt labs w/ next draw. - Lipid Panel; Future     8. Rpt uric acid w/ next labs    I affirm this is a Patient Initiated Episode with an Established Patient who has not had a related appointment within my department in the past 7 days or scheduled within the next 24 hours.     Total Time: 15 minutes    Note: not billable if this call serves to triage the patient into an appointment for the relevant concern      Cj Powell

## 2020-04-29 RX ORDER — SPIRONOLACTONE 25 MG/1
TABLET ORAL
Qty: 60 TABLET | Refills: 3 | Status: SHIPPED | OUTPATIENT
Start: 2020-04-29 | End: 2020-08-17 | Stop reason: SDUPTHER

## 2020-06-01 RX ORDER — ALLOPURINOL 300 MG/1
TABLET ORAL
Qty: 90 TABLET | Refills: 1 | Status: SHIPPED | OUTPATIENT
Start: 2020-06-01 | End: 2020-10-06 | Stop reason: SDUPTHER

## 2020-06-24 ENCOUNTER — TELEPHONE (OUTPATIENT)
Dept: DERMATOLOGY | Age: 70
End: 2020-06-24

## 2020-06-25 ENCOUNTER — TELEPHONE (OUTPATIENT)
Dept: DERMATOLOGY | Age: 70
End: 2020-06-25

## 2020-07-06 NOTE — PROGRESS NOTES
[facility]) Dermatology  Cuong Marshall MD  [phone]      [place]  1950    79 y.o. male     Date of Visit: 7/9/2020    Last Visit: 1yr    Chief Complaint: Skin check    History of Present Illness:  1. Here for skin check. History of actinic keratoses s/p cryotherapy, efudex (scalp 2014). Unsure of new lesions.   -Does not spend much time in sun. Wears hat and T shirt regularly. 2. Wife noticed a white lesion on L cheek recently. Review of Systems:  Constitutional: Reports general sense of well-being. Skin: No interval severe sunburns. Allergies: Reviewed and updated. Past Medical History, Surgical History, Medications and Allergies reviewed. Past Medical History:   Diagnosis Date    Acid reflux     Acute alcohol intoxication ([address] 11/20/2017    Acute respiratory failure with hypoxemia ([facility]) 11/20/2017    Alcohol withdrawal ([address] 11/14/2009    Anxiety     CAD (coronary artery disease)     COPD (chronic obstructive pulmonary disease) ([facility])     Hyperlipidemia     Hypertension     LINSEY (obstructive sleep apnea)     PTSD (post-traumatic stress disorder)      Past Surgical History:   Procedure Laterality Date    CARDIAC SURGERY      cabg    CATARACT REMOVAL WITH IMPLANT Left 08/02/2016    PHACO EMULSIFICATION OF CATARACT WITH INTRAOCULAR LENS    CATARACT REMOVAL WITH IMPLANT Right 08/22/2016    COLONOSCOPY  2011    normal    COLONOSCOPY  12/15/2016    EYE SURGERY      cataract bilateral    HERNIA REPAIR      umbilical    KNEE ARTHROSCOPY         Allergies   Allergen Reactions    Lisinopril Other (See Comments)     angioedema is what he had.   See 12/ 2017 ER visit and [facility] hospitalization    Penicillins Hives    Sulfa Antibiotics Hives    Tetanus Toxoids Hives     Outpatient Medications Marked as Taking for the 7/9/20 encounter (Office Visit) with Cuong Marshall MD   Medication Sig Dispense Refill    allopurinol (ZYLOPRIM) 300 MG tablet TAKE 1 TABLET BY MOUTH ONCE DAILY 90 tablet 1    spironolactone (ALDACTONE) 25 MG tablet Take 1 tablet by mouth twice daily 60 tablet 3    albuterol sulfate HFA (VENTOLIN HFA) 108 (90 Base) MCG/ACT inhaler INHALE TWO PUFFS BY MOUTH EVERY 6 HOURS AS NEEDED FOR WHEEZING 1 Inhaler 5    budesonide-formoterol (SYMBICORT) 160-4.5 MCG/ACT AERO INHALE 1 PUFF BY MOUTH TWICE DAILY 11 Inhaler 2    clonazePAM (KLONOPIN) 2 MG tablet TAKE ONE TABLET BY MOUTH AT BEDTIME AS NEEDED FOR ANXIETY 30 tablet 5    furosemide (LASIX) 40 MG tablet TAKE 1 TABLET BY MOUTH TWICE DAILY 60 tablet 5    ipratropium-albuterol (DUONEB) 0.5-2.5 (3) MG/3ML SOLN nebulizer solution Inhale 3 mLs into the lungs every 4 hours 360 mL 2    TOVIAZ 8 MG TB24 Take 1 tablet by mouth daily 30 tablet 11    ferrous sulfate 325 (65 Fe) MG tablet Take 1 tablet by mouth daily (with breakfast) 30 tablet 5    Cholecalciferol (VITAMIN D3) 50 MCG (2000 UT) CAPS Take 1 capsule by mouth daily      Coenzyme Q10 (COQ10) 100 MG CAPS Take 1 capsule by mouth daily      busPIRone (BUSPAR) 15 MG tablet TAKE TWO-THIRDS TABLET BY MOUTH TWICE DAILY.  40 tablet 5    folic acid (FOLVITE) 1 MG tablet TAKE 1 TABLET BY MOUTH ONCE DAILY 90 tablet 1    metoprolol (LOPRESSOR) 100 MG tablet Take 1 tablet by mouth twice daily 180 tablet 1    rosuvastatin (CRESTOR) 10 MG tablet TAKE 1 TABLET BY MOUTH ONCE DAILY 30 tablet 5    MYRBETRIQ 50 MG TB24 Take 50 mg by mouth daily       omeprazole 20 MG EC tablet Take 20 mg by mouth 2 times daily       vitamin B-12 (CYANOCOBALAMIN) 1000 MCG tablet Take 1 tablet by mouth daily 90 tablet 1    apixaban (ELIQUIS) 5 MG TABS tablet Take 5 mg by mouth 2 times daily       Social History: Retired electric company     Physical Examination     The following were examined and determined to be normal: Psych/Neuro, Scalp/hair, Conjunctivae/eyelids, Gums/teeth/lips, Neck, Breast/axilla/chest, Abdomen, Back, RLE, LLE, Nails/digits and Genitalia/groin/buttocks. The following were examined and determined to be abnormal: Head/face, RUE and LUE. -General: Well-appearing, NAD  1. Dorsum R 1 and L 2 hands, L forearm 2, R helix 2, nasal bridge 2, L cheek 3, central forehead 1, L 1 and R 1 temples - ill-defined irregularly-shaped roughly-scaling thin pink macule(s)/papule(s)   2. L lower lateral cheek - well-defined \"stuck-on\" verrucous tan-brown papule(s)     Assessment and Plan     1. Actinic keratosis(es)  -Edu re: relationship with chronic cumulative sun exposure, low premalignant potential.   -15 lesion(s) treated w/ liquid nitrogen x 2 cycles - Dorsum R 1 and L 2 hands, L forearm 2, R helix 2, nasal bridge 2, L cheek 3, central forehead 1, L 1 and R 1 temples. Edu re: risk of blister formation, discomfort, scar, hypopigmentation. Discussed wound care. -Reviewed sun protective behavior -- sun avoidance during the peak hours of the day, sun-protective clothing (including hat and sunglasses), sunscreen use (water resistant, broad spectrum, SPF at least 30, need for reapplication every 2 to 3 hours). -Return for full skin exam in 1 year (sooner if indicated)      2. Seborrheic keratosis(es)  -Reassurance re: benignity  -No treatment performed.

## 2020-07-09 ENCOUNTER — OFFICE VISIT (OUTPATIENT)
Dept: DERMATOLOGY | Age: 70
End: 2020-07-09
Payer: MEDICARE

## 2020-07-09 VITALS — TEMPERATURE: 98.1 F

## 2020-07-09 PROCEDURE — 99213 OFFICE O/P EST LOW 20 MIN: CPT | Performed by: DERMATOLOGY

## 2020-07-09 PROCEDURE — G8427 DOCREV CUR MEDS BY ELIG CLIN: HCPCS | Performed by: DERMATOLOGY

## 2020-07-09 PROCEDURE — 17004 DESTROY PREMAL LESIONS 15/>: CPT | Performed by: DERMATOLOGY

## 2020-07-09 PROCEDURE — 3017F COLORECTAL CA SCREEN DOC REV: CPT | Performed by: DERMATOLOGY

## 2020-07-09 PROCEDURE — G8417 CALC BMI ABV UP PARAM F/U: HCPCS | Performed by: DERMATOLOGY

## 2020-07-09 PROCEDURE — 4040F PNEUMOC VAC/ADMIN/RCVD: CPT | Performed by: DERMATOLOGY

## 2020-07-09 PROCEDURE — 4004F PT TOBACCO SCREEN RCVD TLK: CPT | Performed by: DERMATOLOGY

## 2020-07-09 PROCEDURE — 1123F ACP DISCUSS/DSCN MKR DOCD: CPT | Performed by: DERMATOLOGY

## 2020-08-17 RX ORDER — SPIRONOLACTONE 25 MG/1
TABLET ORAL
Qty: 60 TABLET | Refills: 3 | Status: SHIPPED | OUTPATIENT
Start: 2020-08-17 | End: 2021-01-25

## 2020-08-17 RX ORDER — FERROUS SULFATE 325(65) MG
325 TABLET ORAL
Qty: 30 TABLET | Refills: 3 | Status: SHIPPED | OUTPATIENT
Start: 2020-08-17 | End: 2020-12-31

## 2020-10-06 ENCOUNTER — OFFICE VISIT (OUTPATIENT)
Dept: FAMILY MEDICINE CLINIC | Age: 70
End: 2020-10-06
Payer: MEDICARE

## 2020-10-06 VITALS
BODY MASS INDEX: 34.24 KG/M2 | HEIGHT: 71 IN | DIASTOLIC BLOOD PRESSURE: 72 MMHG | OXYGEN SATURATION: 97 % | HEART RATE: 76 BPM | SYSTOLIC BLOOD PRESSURE: 120 MMHG | WEIGHT: 244.6 LBS

## 2020-10-06 PROBLEM — I50.32 CHRONIC DIASTOLIC CONGESTIVE HEART FAILURE (HCC): Status: ACTIVE | Noted: 2020-10-06

## 2020-10-06 LAB
A/G RATIO: 1.3 (ref 1.1–2.2)
ALBUMIN SERPL-MCNC: 4 G/DL (ref 3.4–5)
ALP BLD-CCNC: 96 U/L (ref 40–129)
ALT SERPL-CCNC: 30 U/L (ref 10–40)
ANION GAP SERPL CALCULATED.3IONS-SCNC: 13 MMOL/L (ref 3–16)
AST SERPL-CCNC: 50 U/L (ref 15–37)
BASOPHILS ABSOLUTE: 0.1 K/UL (ref 0–0.2)
BASOPHILS RELATIVE PERCENT: 1 %
BILIRUB SERPL-MCNC: 1.1 MG/DL (ref 0–1)
BUN BLDV-MCNC: 17 MG/DL (ref 7–20)
CALCIUM SERPL-MCNC: 9.7 MG/DL (ref 8.3–10.6)
CHLORIDE BLD-SCNC: 94 MMOL/L (ref 99–110)
CHOLESTEROL, TOTAL: 146 MG/DL (ref 0–199)
CO2: 30 MMOL/L (ref 21–32)
CREAT SERPL-MCNC: 1.2 MG/DL (ref 0.8–1.3)
EOSINOPHILS ABSOLUTE: 0.3 K/UL (ref 0–0.6)
EOSINOPHILS RELATIVE PERCENT: 3.9 %
GFR AFRICAN AMERICAN: >60
GFR NON-AFRICAN AMERICAN: 60
GLOBULIN: 3.1 G/DL
GLUCOSE BLD-MCNC: 104 MG/DL (ref 70–99)
HCT VFR BLD CALC: 43.1 % (ref 40.5–52.5)
HDLC SERPL-MCNC: 72 MG/DL (ref 40–60)
HEMOGLOBIN: 14.5 G/DL (ref 13.5–17.5)
INR BLD: 1.79 (ref 0.86–1.14)
IRON SATURATION: 38 % (ref 20–50)
IRON: 132 UG/DL (ref 59–158)
LDL CHOLESTEROL CALCULATED: 56 MG/DL
LYMPHOCYTES ABSOLUTE: 1.3 K/UL (ref 1–5.1)
LYMPHOCYTES RELATIVE PERCENT: 16 %
MCH RBC QN AUTO: 32.2 PG (ref 26–34)
MCHC RBC AUTO-ENTMCNC: 33.7 G/DL (ref 31–36)
MCV RBC AUTO: 95.8 FL (ref 80–100)
MONOCYTES ABSOLUTE: 0.8 K/UL (ref 0–1.3)
MONOCYTES RELATIVE PERCENT: 9.7 %
NEUTROPHILS ABSOLUTE: 5.4 K/UL (ref 1.7–7.7)
NEUTROPHILS RELATIVE PERCENT: 69.4 %
PDW BLD-RTO: 15.4 % (ref 12.4–15.4)
PLATELET # BLD: 123 K/UL (ref 135–450)
PMV BLD AUTO: 9.9 FL (ref 5–10.5)
POTASSIUM SERPL-SCNC: 4.2 MMOL/L (ref 3.5–5.1)
PROTHROMBIN TIME: 20.9 SEC (ref 10–13.2)
RBC # BLD: 4.5 M/UL (ref 4.2–5.9)
SODIUM BLD-SCNC: 137 MMOL/L (ref 136–145)
TOTAL IRON BINDING CAPACITY: 347 UG/DL (ref 260–445)
TOTAL PROTEIN: 7.1 G/DL (ref 6.4–8.2)
TRIGL SERPL-MCNC: 91 MG/DL (ref 0–150)
URIC ACID, SERUM: 4.6 MG/DL (ref 3.5–7.2)
VLDLC SERPL CALC-MCNC: 18 MG/DL
WBC # BLD: 7.8 K/UL (ref 4–11)

## 2020-10-06 PROCEDURE — 4040F PNEUMOC VAC/ADMIN/RCVD: CPT | Performed by: FAMILY MEDICINE

## 2020-10-06 PROCEDURE — G8484 FLU IMMUNIZE NO ADMIN: HCPCS | Performed by: FAMILY MEDICINE

## 2020-10-06 PROCEDURE — 3023F SPIROM DOC REV: CPT | Performed by: FAMILY MEDICINE

## 2020-10-06 PROCEDURE — 4004F PT TOBACCO SCREEN RCVD TLK: CPT | Performed by: FAMILY MEDICINE

## 2020-10-06 PROCEDURE — 3017F COLORECTAL CA SCREEN DOC REV: CPT | Performed by: FAMILY MEDICINE

## 2020-10-06 PROCEDURE — G8427 DOCREV CUR MEDS BY ELIG CLIN: HCPCS | Performed by: FAMILY MEDICINE

## 2020-10-06 PROCEDURE — 36415 COLL VENOUS BLD VENIPUNCTURE: CPT | Performed by: FAMILY MEDICINE

## 2020-10-06 PROCEDURE — G8417 CALC BMI ABV UP PARAM F/U: HCPCS | Performed by: FAMILY MEDICINE

## 2020-10-06 PROCEDURE — 1123F ACP DISCUSS/DSCN MKR DOCD: CPT | Performed by: FAMILY MEDICINE

## 2020-10-06 PROCEDURE — G8926 SPIRO NO PERF OR DOC: HCPCS | Performed by: FAMILY MEDICINE

## 2020-10-06 PROCEDURE — G0008 ADMIN INFLUENZA VIRUS VAC: HCPCS | Performed by: FAMILY MEDICINE

## 2020-10-06 PROCEDURE — 99214 OFFICE O/P EST MOD 30 MIN: CPT | Performed by: FAMILY MEDICINE

## 2020-10-06 PROCEDURE — 90694 VACC AIIV4 NO PRSRV 0.5ML IM: CPT | Performed by: FAMILY MEDICINE

## 2020-10-06 RX ORDER — BUSPIRONE HYDROCHLORIDE 15 MG/1
TABLET ORAL
Qty: 40 TABLET | Refills: 5 | Status: SHIPPED | OUTPATIENT
Start: 2020-10-06 | End: 2020-10-06 | Stop reason: SDUPTHER

## 2020-10-06 RX ORDER — BUSPIRONE HYDROCHLORIDE 10 MG/1
10 TABLET ORAL 2 TIMES DAILY
Qty: 180 TABLET | Refills: 1 | Status: SHIPPED | OUTPATIENT
Start: 2020-10-06 | End: 2021-04-12

## 2020-10-06 RX ORDER — METOPROLOL TARTRATE 100 MG/1
TABLET ORAL
Qty: 180 TABLET | Refills: 3 | Status: SHIPPED | OUTPATIENT
Start: 2020-10-06 | End: 2022-04-20

## 2020-10-06 RX ORDER — FUROSEMIDE 40 MG/1
TABLET ORAL
Qty: 180 TABLET | Refills: 3 | Status: SHIPPED | OUTPATIENT
Start: 2020-10-06 | End: 2021-06-11 | Stop reason: ALTCHOICE

## 2020-10-06 RX ORDER — ALLOPURINOL 300 MG/1
TABLET ORAL
Qty: 90 TABLET | Refills: 3 | Status: SHIPPED | OUTPATIENT
Start: 2020-10-06 | End: 2021-12-02 | Stop reason: SDUPTHER

## 2020-10-06 RX ORDER — BUDESONIDE AND FORMOTEROL FUMARATE DIHYDRATE 160; 4.5 UG/1; UG/1
AEROSOL RESPIRATORY (INHALATION)
Qty: 3 INHALER | Refills: 3 | Status: SHIPPED | OUTPATIENT
Start: 2020-10-06 | End: 2022-04-12 | Stop reason: SDUPTHER

## 2020-10-06 RX ORDER — ROSUVASTATIN CALCIUM 10 MG/1
TABLET, COATED ORAL
Qty: 90 TABLET | Refills: 3 | Status: SHIPPED | OUTPATIENT
Start: 2020-10-06 | End: 2021-08-17

## 2020-10-06 ASSESSMENT — ENCOUNTER SYMPTOMS
DIARRHEA: 0
BLOOD IN STOOL: 0
CONSTIPATION: 0
SHORTNESS OF BREATH: 1

## 2020-10-06 NOTE — PROGRESS NOTES
Vaccine Information Sheet, \"Influenza - Inactivated\"  given to Tricia Manual, or parent/legal guardian of  Tricia Skinner and verbalized understanding. Patient responses:    Have you ever had a reaction to a flu vaccine? No  Do you have any current illness? No  Have you ever had Guillian Lagro Syndrome? No  Do you have a serious allergy to any of the follow: Neomycin, Polymyxin, Thimerosal, eggs or egg products? No    Flu vaccine given per order. Please see immunization tab. Risks and benefits explained. Current VIS given. Immunizations Administered     Name Date Dose Route    Influenza, Quadv, adjuvanted, 65 yrs +, IM, PF (Fluad) 10/6/2020 0.5 mL Intramuscular    Site: Deltoid- Right    Lot: [id number]    NDC: 38354-965-70        Blood drawn per order. Needle size: 23 g  Site: R Cephalic. First attempt successful Yes    Second attempt no    Pressure applied until bleeding stopped. Yes applied. Patient informed to call office or return if bleeding reoccurs and unable to stop.     Tubes drawn: 1 purple     1 red    1 blue

## 2020-10-06 NOTE — PROGRESS NOTES
intoxication ([facility] 2017    Acute respiratory failure with hypoxemia ([facility] 2017    Alcohol withdrawal ([facility] 2009    Anxiety     CAD (coronary artery disease)     COPD (chronic obstructive pulmonary disease) (HCC)     Hyperlipidemia     Hypertension     LINSEY (obstructive sleep apnea)     PTSD (post-traumatic stress disorder)        Family History   Problem Relation Age of Onset    Cancer Father         skin    Cancer Paternal Cousin         melanoma-thigh       Social History     Socioeconomic History    Marital status:      Spouse name: Not on file    Number of children: Not on file    Years of education: Not on file    Highest education level: Not on file   Occupational History    Not on file   Social Needs    Financial resource strain: Not on file    Food insecurity     Worry: Not on file     Inability: Not on file    Transportation needs     Medical: Not on file     Non-medical: Not on file   Tobacco Use    Smoking status: Former Smoker     Packs/day: 2.00     Years: 40.00     Pack years: 80.00     Types: Cigarettes     Last attempt to quit: 2002     Years since quittin.7    Smokeless tobacco: Current User     Types: Snuff    Tobacco comment: 2016 placed in avs   Substance and Sexual Activity    Alcohol use:  Yes     Alcohol/week: 10.0 - 12.0 standard drinks     Types: 10 - 12 Shots of liquor per week     Comment: 10-12 shots / day    Drug use: No    Sexual activity: Not Currently     Partners: Female   Lifestyle    Physical activity     Days per week: Not on file     Minutes per session: Not on file    Stress: Not on file   Relationships    Social connections     Talks on phone: Not on file     Gets together: Not on file     Attends Zoroastrianism service: Not on file     Active member of club or organization: Not on file     Attends meetings of clubs or organizations: Not on file     Relationship status: Not on file    Intimate partner violence Fear of current or ex partner: Not on file     Emotionally abused: Not on file     Physically abused: Not on file     Forced sexual activity: Not on file   Other Topics Concern    Not on file   Social History Narrative    Not on file       Prior to Visit Medications    Medication Sig Taking? Authorizing Provider   metoprolol (LOPRESSOR) 100 MG tablet Take 1 tablet by mouth twice daily Yes Nicole Taylor MD   furosemide (LASIX) 40 MG tablet TAKE 1 TABLET BY MOUTH TWICE DAILY Yes Nicole Taylor MD   busPIRone (BUSPAR) 15 MG tablet TAKE TWO-THIRDS TABLET BY MOUTH TWICE DAILY.  Yes Nicole Taylor MD   allopurinol (ZYLOPRIM) 300 MG tablet TAKE 1 TABLET BY MOUTH ONCE DAILY Yes Nicole Taylor MD   rosuvastatin (CRESTOR) 10 MG tablet TAKE 1 TABLET BY MOUTH ONCE DAILY Yes Nicole Taylor MD   budesonide-formoterol (SYMBICORT) 160-4.5 MCG/ACT AERO INHALE 1 PUFF BY MOUTH TWICE DAILY Yes Nicole Taylor MD   spironolactone (ALDACTONE) 25 MG tablet Take 1 tablet by mouth twice daily Yes Nicole Taylor MD   ferrous sulfate (IRON 325) 325 (65 Fe) MG tablet Take 1 tablet by mouth daily (with breakfast) Yes Nicole Taylor MD   albuterol sulfate HFA (VENTOLIN HFA) 108 (90 Base) MCG/ACT inhaler INHALE TWO PUFFS BY MOUTH EVERY 6 HOURS AS NEEDED FOR WHEEZING Yes Gustavo Graham MD   clonazePAM (KLONOPIN) 2 MG tablet TAKE ONE TABLET BY MOUTH AT BEDTIME AS NEEDED FOR ANXIETY Yes Gustavo Graham MD   TOVIAZ 8 MG TB24 Take 1 tablet by mouth daily Yes Gustavo Graham MD   Cholecalciferol (VITAMIN D3) 50 MCG (2000 UT) CAPS Take 1 capsule by mouth daily Yes Historical Provider, MD   Coenzyme Q10 (COQ10) 100 MG CAPS Take 1 capsule by mouth daily Yes Historical Provider, MD   MYRBETRIQ 50 MG TB24 Take 50 mg by mouth daily  Yes Historical Provider, MD   omeprazole 20 MG EC tablet Take 20 mg by mouth 2 times daily  Yes Historical Provider, MD   apixaban (ELIQUIS) 5 MG TABS tablet Take 5 mg by mouth 2 times daily Yes Historical Provider, MD   ipratropium-albuterol (DUONEB) 0.5-2.5 (3) MG/3ML SOLN nebulizer solution Inhale 3 mLs into the lungs every 4 hours  Genna Mcdowell MD   vitamin B-12 (CYANOCOBALAMIN) 1000 MCG tablet Take 1 tablet by mouth daily  Genna Mcdowell MD       Allergies   Allergen Reactions    Lisinopril Other (See Comments)     angioedema is what he had. See 12/ 2017 ER visit and [facility] hospitalization    Penicillins Hives    Sulfa Antibiotics Hives    Tetanus Toxoids Hives       OBJECTIVE:    /72   Pulse 76   Ht 5' 11\" (1.803 m)   Wt 244 lb 9.6 oz (110.9 kg)   SpO2 97%   BMI 34.11 kg/m²     BP Readings from Last 2 Encounters:   10/06/20 120/72   02/12/20 122/82       Wt Readings from Last 3 Encounters:   10/06/20 244 lb 9.6 oz (110.9 kg)   02/12/20 255 lb (115.7 kg)   02/07/20 269 lb (122 kg)       Physical Exam  Constitutional:       Appearance: He is well-developed. HENT:      Head: Normocephalic and atraumatic. Right Ear: Hearing normal.      Left Ear: Hearing normal.   Eyes:      Conjunctiva/sclera: Conjunctivae normal.   Neck:      Trachea: No tracheal deviation. Cardiovascular:      Rate and Rhythm: Normal rate and regular rhythm. Heart sounds: Heart sounds are distant. No murmur. No friction rub. No gallop. Pulmonary:      Effort: Pulmonary effort is normal.      Breath sounds: Normal breath sounds. Abdominal:      Palpations: Abdomen is soft. Tenderness: There is no abdominal tenderness. Musculoskeletal:      Right lower leg: No edema. Left lower leg: No edema. Skin:     General: Skin is warm and dry. Neurological:      Mental Status: He is alert and oriented to person, place, and time. Psychiatric:         Mood and Affect: Mood normal.         Behavior: Behavior normal.           ASSESSMENT/PLAN:    1. Essential hypertension  The current medical regimen is effective;  continue present plan and medications.    - metoprolol (LOPRESSOR)

## 2020-10-06 NOTE — TELEPHONE ENCOUNTER
Spoke with patient's wife regarding dosage and she states that patient is taking the Buspar 15mg 2/3 tablet twice daily. Spoke with her regarding changing to 10mg bid.

## 2020-10-06 NOTE — RESULT ENCOUNTER NOTE
inr again somewhat elevated. Likely related to chronic etoh use and effects on the liver. rec cont to work on reduction of etoh intake. Anemia improved. Platelets again low, similar to prior. Likely related to etoh use and effects on liver and possible enlarged spleen. Uric acid normal.  Cont meds. Mild inc in ast, similar to prior. O/w cmp ok. Lipids well controlled. Iron levels ok. Improved from prior.

## 2020-10-13 ENCOUNTER — TELEPHONE (OUTPATIENT)
Dept: FAMILY MEDICINE CLINIC | Age: 70
End: 2020-10-13

## 2020-10-13 NOTE — TELEPHONE ENCOUNTER
Patient is asking for a note to be excused from jury duty due to his current medical conditions. He would like it faxed to his wife at her work.

## 2020-10-13 NOTE — LETTER
[address]  [address] [facility] [address] [place] [address]  Phone: [phone]  Fax: [phone]    Tiny Cuevas MD        October 13, 2020     Patient: Adam Cabral   YOB: 1950           To Whom It May Concern: It is my medical opinion that Celina Steele is unable to perform jury duty at this time. If you have any questions or concerns, please don't hesitate to call.     Sincerely,        Tiny Cuevas MD

## 2020-12-04 ENCOUNTER — VIRTUAL VISIT (OUTPATIENT)
Dept: FAMILY MEDICINE CLINIC | Age: 70
End: 2020-12-04
Payer: MEDICARE

## 2020-12-04 PROCEDURE — 4040F PNEUMOC VAC/ADMIN/RCVD: CPT | Performed by: FAMILY MEDICINE

## 2020-12-04 PROCEDURE — 99213 OFFICE O/P EST LOW 20 MIN: CPT | Performed by: FAMILY MEDICINE

## 2020-12-04 PROCEDURE — 1123F ACP DISCUSS/DSCN MKR DOCD: CPT | Performed by: FAMILY MEDICINE

## 2020-12-04 PROCEDURE — 4004F PT TOBACCO SCREEN RCVD TLK: CPT | Performed by: FAMILY MEDICINE

## 2020-12-04 PROCEDURE — G8427 DOCREV CUR MEDS BY ELIG CLIN: HCPCS | Performed by: FAMILY MEDICINE

## 2020-12-04 PROCEDURE — G8417 CALC BMI ABV UP PARAM F/U: HCPCS | Performed by: FAMILY MEDICINE

## 2020-12-04 PROCEDURE — G8484 FLU IMMUNIZE NO ADMIN: HCPCS | Performed by: FAMILY MEDICINE

## 2020-12-04 PROCEDURE — 3017F COLORECTAL CA SCREEN DOC REV: CPT | Performed by: FAMILY MEDICINE

## 2020-12-04 RX ORDER — TRAZODONE HYDROCHLORIDE 50 MG/1
50 TABLET ORAL NIGHTLY PRN
Qty: 30 TABLET | Refills: 5 | Status: SHIPPED | OUTPATIENT
Start: 2020-12-04 | End: 2021-01-05 | Stop reason: DRUGHIGH

## 2020-12-04 NOTE — PROGRESS NOTES
continue present plan and medications. 4. LINSEY (non-compliant with home CPAP/BiPAP)  Did not tony. Has not used for some time    5. Coronary artery disease involving native coronary artery of native heart without angina pectoris  F/u w/ cardio as sched.               Danny Valadez

## 2020-12-31 RX ORDER — FERROUS SULFATE 325(65) MG
TABLET ORAL
Qty: 30 TABLET | Refills: 0 | Status: SHIPPED | OUTPATIENT
Start: 2020-12-31 | End: 2021-02-08

## 2021-01-05 ENCOUNTER — TELEPHONE (OUTPATIENT)
Dept: FAMILY MEDICINE CLINIC | Age: 71
End: 2021-01-05

## 2021-01-05 DIAGNOSIS — G47.00 INSOMNIA, UNSPECIFIED TYPE: ICD-10-CM

## 2021-01-05 RX ORDER — TRAZODONE HYDROCHLORIDE 100 MG/1
100 TABLET ORAL NIGHTLY PRN
Qty: 30 TABLET | Refills: 3 | Status: SHIPPED
Start: 2021-01-05 | End: 2021-01-18 | Stop reason: SDUPTHER

## 2021-01-05 NOTE — TELEPHONE ENCOUNTER
Patient calling back to let you know trazodone does help, wanting to know if he can increase? Also wanting to know if he is ok to take covid vaccine d/t multiple medication allergies.

## 2021-01-05 NOTE — TELEPHONE ENCOUNTER
Inc trazodone to 100mg nightly. Should be ok to take vaccine when available unless allergy to specific component of vaccine.   Would likely just need to monitor longer at time of vaccine

## 2021-01-18 DIAGNOSIS — G47.00 INSOMNIA, UNSPECIFIED TYPE: ICD-10-CM

## 2021-01-18 RX ORDER — TRAZODONE HYDROCHLORIDE 100 MG/1
100 TABLET ORAL NIGHTLY PRN
Qty: 30 TABLET | Refills: 3 | Status: SHIPPED | OUTPATIENT
Start: 2021-01-18 | End: 2021-02-08 | Stop reason: ALTCHOICE

## 2021-01-25 RX ORDER — SPIRONOLACTONE 25 MG/1
TABLET ORAL
Qty: 180 TABLET | Refills: 3 | Status: SHIPPED | OUTPATIENT
Start: 2021-01-25 | End: 2021-04-13

## 2021-01-28 ENCOUNTER — OFFICE VISIT (OUTPATIENT)
Dept: FAMILY MEDICINE CLINIC | Age: 71
End: 2021-01-28
Payer: MEDICARE

## 2021-01-28 VITALS
DIASTOLIC BLOOD PRESSURE: 42 MMHG | HEART RATE: 68 BPM | WEIGHT: 257.4 LBS | SYSTOLIC BLOOD PRESSURE: 76 MMHG | TEMPERATURE: 96.4 F | BODY MASS INDEX: 36.03 KG/M2 | HEIGHT: 71 IN | OXYGEN SATURATION: 97 %

## 2021-01-28 DIAGNOSIS — I48.19 PERSISTENT ATRIAL FIBRILLATION (HCC): Chronic | ICD-10-CM

## 2021-01-28 DIAGNOSIS — I50.32 CHRONIC DIASTOLIC CONGESTIVE HEART FAILURE (HCC): ICD-10-CM

## 2021-01-28 DIAGNOSIS — I10 ESSENTIAL HYPERTENSION: Chronic | ICD-10-CM

## 2021-01-28 DIAGNOSIS — I95.9 HYPOTENSION, UNSPECIFIED HYPOTENSION TYPE: ICD-10-CM

## 2021-01-28 DIAGNOSIS — R42 DIZZINESS: Primary | ICD-10-CM

## 2021-01-28 PROCEDURE — 36415 COLL VENOUS BLD VENIPUNCTURE: CPT | Performed by: FAMILY MEDICINE

## 2021-01-28 PROCEDURE — 4040F PNEUMOC VAC/ADMIN/RCVD: CPT | Performed by: FAMILY MEDICINE

## 2021-01-28 PROCEDURE — G8417 CALC BMI ABV UP PARAM F/U: HCPCS | Performed by: FAMILY MEDICINE

## 2021-01-28 PROCEDURE — 1123F ACP DISCUSS/DSCN MKR DOCD: CPT | Performed by: FAMILY MEDICINE

## 2021-01-28 PROCEDURE — G8484 FLU IMMUNIZE NO ADMIN: HCPCS | Performed by: FAMILY MEDICINE

## 2021-01-28 PROCEDURE — 99214 OFFICE O/P EST MOD 30 MIN: CPT | Performed by: FAMILY MEDICINE

## 2021-01-28 PROCEDURE — 4004F PT TOBACCO SCREEN RCVD TLK: CPT | Performed by: FAMILY MEDICINE

## 2021-01-28 PROCEDURE — 3017F COLORECTAL CA SCREEN DOC REV: CPT | Performed by: FAMILY MEDICINE

## 2021-01-28 PROCEDURE — G8427 DOCREV CUR MEDS BY ELIG CLIN: HCPCS | Performed by: FAMILY MEDICINE

## 2021-01-28 ASSESSMENT — ENCOUNTER SYMPTOMS: SHORTNESS OF BREATH: 0

## 2021-01-28 NOTE — PROGRESS NOTES
Chief Complaint   Patient presents with    Dizziness       HPI:  Hu Keller is a 79 y.o. (: 1950) here today   for dizziness for 3 or 4 months. Gradually becoming worse. Has made some diet changes and less etoh over past few mo. Inc fruits and veggies. Less red meat. Dizziness  This is a new problem. The current episode started more than 1 month ago (3-4 mo). The problem occurs intermittently. The problem has been waxing and waning. Pertinent negatives include no fever. The symptoms are aggravated by standing. dizziness is worse w/ standing quickly. Has nearly fallen in the shower as well. Will be present in am, improves later in the day. Aggravated w/ riding long distances as well. Has to stop and steady himself. Sob not new. Hx of a flutter. Doing well w/ trazodone. Patient's medications, allergies, past medical, surgical, social and family histories were reviewed and updated as appropriate. ROS:  Review of Systems   Constitutional: Negative for fever. Respiratory: Negative for shortness of breath. Neurological: Positive for dizziness. Prior to Visit Medications    Medication Sig Taking?  Authorizing Provider   spironolactone (ALDACTONE) 25 MG tablet Take 1 tablet by mouth twice daily Yes MO De Leon CNP   traZODone (DESYREL) 100 MG tablet Take 1 tablet by mouth nightly as needed for Sleep Yes Syed Samuel MD   ferrous sulfate (IRON 325) 325 (65 Fe) MG tablet Take 1 tablet by mouth once daily with breakfast Yes MO De Leon CNP   metoprolol (LOPRESSOR) 100 MG tablet Take 1 tablet by mouth twice daily Yes Syed Samuel MD   furosemide (LASIX) 40 MG tablet TAKE 1 TABLET BY MOUTH TWICE DAILY Yes Syed Samuel MD   allopurinol (ZYLOPRIM) 300 MG tablet TAKE 1 TABLET BY MOUTH ONCE DAILY Yes Syed Samuel MD   rosuvastatin (CRESTOR) 10 MG tablet TAKE 1 TABLET BY MOUTH ONCE DAILY Yes Syed Samuel MD budesonide-formoterol (SYMBICORT) 160-4.5 MCG/ACT AERO INHALE 1 PUFF BY MOUTH TWICE DAILY Yes Arleen Tovar MD   busPIRone (BUSPAR) 10 MG tablet Take 1 tablet by mouth 2 times daily Yes Arleen Tovar MD   albuterol sulfate HFA (VENTOLIN HFA) 108 (90 Base) MCG/ACT inhaler INHALE TWO PUFFS BY MOUTH EVERY 6 HOURS AS NEEDED FOR WHEEZING Yes Karina Lawton MD   clonazePAM (KLONOPIN) 2 MG tablet TAKE ONE TABLET BY MOUTH AT BEDTIME AS NEEDED FOR ANXIETY Yes Karina Lawton MD   ipratropium-albuterol (DUONEB) 0.5-2.5 (3) MG/3ML SOLN nebulizer solution Inhale 3 mLs into the lungs every 4 hours Yes Karina Lawton MD   TOVIAZ 8 MG TB24 Take 1 tablet by mouth daily Yes Karina Lawton MD   Cholecalciferol (VITAMIN D3) 50 MCG (2000 UT) CAPS Take 1 capsule by mouth daily Yes Historical Provider, MD   Coenzyme Q10 (COQ10) 100 MG CAPS Take 1 capsule by mouth daily Yes Historical Provider, MD   MYRBETRIQ 50 MG TB24 Take 50 mg by mouth daily  Yes Historical Provider, MD   omeprazole 20 MG EC tablet Take 20 mg by mouth 2 times daily  Yes Historical Provider, MD   vitamin B-12 (CYANOCOBALAMIN) 1000 MCG tablet Take 1 tablet by mouth daily Yes Karina Lawton MD   apixaban (ELIQUIS) 5 MG TABS tablet Take 5 mg by mouth 2 times daily Yes Historical Provider, MD       Allergies   Allergen Reactions    Lisinopril Other (See Comments)     angioedema is what he had.   See 12/ 2017 ER visit and [facility] hospitalization    Penicillins Hives    Sulfa Antibiotics Hives    Tetanus Toxoids Hives       OBJECTIVE:    BP (!) 76/42 Comment: standing  Pulse 68   Temp 96.4 °F (35.8 °C)   Ht 5' 11\" (1.803 m)   Wt 257 lb 6.4 oz (116.8 kg)   SpO2 97%   BMI 35.90 kg/m²     BP Readings from Last 2 Encounters:   01/28/21 (!) 76/42   10/06/20 120/72       Wt Readings from Last 3 Encounters:   01/28/21 257 lb 6.4 oz (116.8 kg)   10/06/20 244 lb 9.6 oz (110.9 kg)   02/12/20 255 lb (115.7 kg)       Physical Exam Constitutional:       Appearance: He is well-developed. HENT:      Head: Normocephalic and atraumatic. Right Ear: Hearing normal.      Left Ear: Hearing normal.   Eyes:      Conjunctiva/sclera: Conjunctivae normal.   Neck:      Vascular: No carotid bruit. Trachea: No tracheal deviation. Cardiovascular:      Rate and Rhythm: Normal rate and regular rhythm. Heart sounds: Heart sounds are distant. No murmur. No friction rub. No gallop. Pulmonary:      Effort: Pulmonary effort is normal.      Breath sounds: Normal breath sounds. Abdominal:      Palpations: Abdomen is soft. Tenderness: There is no abdominal tenderness. Skin:     General: Skin is warm and dry. Neurological:      Mental Status: He is alert and oriented to person, place, and time. Psychiatric:         Mood and Affect: Mood normal.         Behavior: Behavior normal.           ASSESSMENT/PLAN:     1. Dizziness  bp dropped w/ standing. See below. Stop Quinones [place] given also taking myrbetriq  Adjust meds as below per cardio recs. Current sxs were present prior to inc trazodone dose.   - TSH without Reflex  - T4, Free  - Comprehensive Metabolic Panel  - CBC Auto Differential    2. Essential hypertension  See below. 3. Persistent atrial fibrillation (HCC)  The current medical regimen is effective;  continue present plan and medications. Rpt labs. - TSH without Reflex  - T4, Free    4. Chronic diastolic congestive heart failure ([address]  See above and below. No sig sob noted. 5. Hypotension, unspecified hypotension type   spoke w/ cardio. Try stopping aldactone and lasix. Monitor swelling. After 3 days, resume lasix at 40mg daily. Call first of the week w/ response to tx. Watch for inc sob or worsening edema. Suspected etiology of lightheadedness.

## 2021-01-28 NOTE — PATIENT INSTRUCTIONS
Patient Education        Stopping Smokeless Tobacco Use: Care Instructions  Your Care Instructions     Smokeless tobacco comes in many forms, such as snuff and chewing tobacco:  · Snuff is finely ground tobacco sold in cans or pouches. Most of the time, snuff is used by putting a \"pinch\" or \"dip\" between the lower lip or cheek and the gum. · Chewing tobacco is sold as loose leaves, plugs, or twists. It is chewed or placed between the cheek and the gum or teeth. There are plenty of reasons to stop using smokeless tobacco. These products are harmful. They are not risk-free alternatives to smoking. Smokeless tobacco contains nicotine, which is addicting. Though using smokeless tobacco is less harmful than smoking cigarettes, it can cause serious health problems, such as:  · White patches or red sores in your mouth that can turn into mouth cancer involving the lip, tongue, or cheek. · Tooth loss and other dental problems. · Gum disease. Your gums may pull away from your teeth and not grow back. People who use smokeless tobacco crave the nicotine in it. Giving up smokeless tobacco is much harder than simply changing a habit. Your body has to stop craving the nicotine. It is hard to quit, but you can do it. Many tools are available for people who want to quit using smokeless tobacco. You may find that combining tools works best for you. There are several steps to quitting. First you get ready to quit. Then you get support to help you. After that, you learn new skills and behaviors to quit. For many people, a necessary step is getting and using medicine. Your doctor will help you set up the plan that best meets your needs. You may want to attend a tobacco cessation program. When you choose a program, look for one that has proven success. Ask your doctor for ideas.  You will greatly increase your chances of success if you take medicine as well as get counseling or join a cessation program.

## 2021-01-29 LAB
A/G RATIO: 1.3 (ref 1.1–2.2)
ALBUMIN SERPL-MCNC: 3.9 G/DL (ref 3.4–5)
ALP BLD-CCNC: 83 U/L (ref 40–129)
ALT SERPL-CCNC: 27 U/L (ref 10–40)
ANION GAP SERPL CALCULATED.3IONS-SCNC: 10 MMOL/L (ref 3–16)
AST SERPL-CCNC: 36 U/L (ref 15–37)
BASOPHILS ABSOLUTE: 0.1 K/UL (ref 0–0.2)
BASOPHILS RELATIVE PERCENT: 1.3 %
BILIRUB SERPL-MCNC: 0.7 MG/DL (ref 0–1)
BUN BLDV-MCNC: 19 MG/DL (ref 7–20)
CALCIUM SERPL-MCNC: 9.5 MG/DL (ref 8.3–10.6)
CHLORIDE BLD-SCNC: 99 MMOL/L (ref 99–110)
CO2: 30 MMOL/L (ref 21–32)
CREAT SERPL-MCNC: 1.4 MG/DL (ref 0.8–1.3)
EOSINOPHILS ABSOLUTE: 0.3 K/UL (ref 0–0.6)
EOSINOPHILS RELATIVE PERCENT: 5.6 %
GFR AFRICAN AMERICAN: >60
GFR NON-AFRICAN AMERICAN: 50
GLOBULIN: 3 G/DL
GLUCOSE BLD-MCNC: 82 MG/DL (ref 70–99)
HCT VFR BLD CALC: 38.9 % (ref 40.5–52.5)
HEMOGLOBIN: 13 G/DL (ref 13.5–17.5)
LYMPHOCYTES ABSOLUTE: 1.1 K/UL (ref 1–5.1)
LYMPHOCYTES RELATIVE PERCENT: 19 %
MCH RBC QN AUTO: 32.5 PG (ref 26–34)
MCHC RBC AUTO-ENTMCNC: 33.3 G/DL (ref 31–36)
MCV RBC AUTO: 97.7 FL (ref 80–100)
MONOCYTES ABSOLUTE: 1 K/UL (ref 0–1.3)
MONOCYTES RELATIVE PERCENT: 17.7 %
NEUTROPHILS ABSOLUTE: 3.3 K/UL (ref 1.7–7.7)
NEUTROPHILS RELATIVE PERCENT: 56.4 %
PDW BLD-RTO: 15.4 % (ref 12.4–15.4)
PLATELET # BLD: 98 K/UL (ref 135–450)
PMV BLD AUTO: 10.4 FL (ref 5–10.5)
POTASSIUM SERPL-SCNC: 4.2 MMOL/L (ref 3.5–5.1)
RBC # BLD: 3.98 M/UL (ref 4.2–5.9)
SODIUM BLD-SCNC: 139 MMOL/L (ref 136–145)
T4 FREE: 1 NG/DL (ref 0.9–1.8)
TOTAL PROTEIN: 6.9 G/DL (ref 6.4–8.2)
TSH SERPL DL<=0.05 MIU/L-ACNC: 2.96 UIU/ML (ref 0.27–4.2)
WBC # BLD: 5.8 K/UL (ref 4–11)

## 2021-02-01 ENCOUNTER — TELEPHONE (OUTPATIENT)
Dept: FAMILY MEDICINE CLINIC | Age: 71
End: 2021-02-01

## 2021-02-01 DIAGNOSIS — D50.0 IRON DEFICIENCY ANEMIA DUE TO CHRONIC BLOOD LOSS: ICD-10-CM

## 2021-02-01 DIAGNOSIS — D50.0 IRON DEFICIENCY ANEMIA DUE TO CHRONIC BLOOD LOSS: Primary | ICD-10-CM

## 2021-02-01 LAB
FERRITIN: 132.2 NG/ML (ref 30–400)
IRON SATURATION: 50 % (ref 20–50)
IRON: 162 UG/DL (ref 59–158)
TOTAL IRON BINDING CAPACITY: 324 UG/DL (ref 260–445)

## 2021-02-01 NOTE — TELEPHONE ENCOUNTER
Patient called and states his blood pressure is much better. It was 126/76 on Friday and dizziness is much improved. Patient states his feet are starting to swell and little but he is supposed to start the Lasix back today. Patient states that he has been having issues with sleeping the last week. He states the sleeping med was helping at first but now it doesn't seem to be.

## 2021-02-01 NOTE — TELEPHONE ENCOUNTER
Trazodone at 100mg qhs.  rec inc to 150mg qhs.   If ongoing issues, will need to look for other options

## 2021-02-08 DIAGNOSIS — D50.0 IRON DEFICIENCY ANEMIA DUE TO CHRONIC BLOOD LOSS: ICD-10-CM

## 2021-02-08 RX ORDER — AMITRIPTYLINE HYDROCHLORIDE 25 MG/1
25 TABLET, FILM COATED ORAL NIGHTLY
Qty: 30 TABLET | Refills: 0 | Status: SHIPPED | OUTPATIENT
Start: 2021-02-08 | End: 2021-03-10

## 2021-02-08 RX ORDER — FERROUS SULFATE 325(65) MG
TABLET ORAL
Qty: 30 TABLET | Refills: 3 | Status: SHIPPED | OUTPATIENT
Start: 2021-02-08 | End: 2021-06-10

## 2021-02-08 NOTE — TELEPHONE ENCOUNTER
Given his low blood pressure, I would not add back Aldactone at this point. Recommend cardiology follow-up.  Continue Lasix at 40 mg for now

## 2021-02-08 NOTE — TELEPHONE ENCOUNTER
We stopped aldactone and lasix and was to resume lasix in 3 days. Confirm current lasix dose. Stop trazodone.   Trial of amitriptyline 25mg po qhs.

## 2021-02-08 NOTE — TELEPHONE ENCOUNTER
Patient is stating that he is still swelling, twice the size. Dr. Sada Reyes said to stop one of medications and you took him off two. His wife put back on a water pill. Per wife she had put him on a extra furosemide 40mg in the morning. BP is running down again. Not 70/42 low but 106/60 low. Also states the sleeping medication at 150mg is not working.

## 2021-03-10 ENCOUNTER — TELEPHONE (OUTPATIENT)
Dept: FAMILY MEDICINE CLINIC | Age: 71
End: 2021-03-10

## 2021-03-10 DIAGNOSIS — G47.00 INSOMNIA, UNSPECIFIED TYPE: Primary | ICD-10-CM

## 2021-03-10 RX ORDER — TRAZODONE HYDROCHLORIDE 100 MG/1
200 TABLET ORAL NIGHTLY
COMMUNITY
End: 2022-04-20

## 2021-03-10 NOTE — TELEPHONE ENCOUNTER
He has recently been taking the trazodone 100mg taking 2 at bedtime. He is not taking the amitriptyline right now because it does not help either.

## 2021-03-10 NOTE — TELEPHONE ENCOUNTER
pls confirm current meds he is taking for insomnia. Prior note stated trazodone 150mg qhs.   Med list states amitrip 25mg

## 2021-03-10 NOTE — TELEPHONE ENCOUNTER
Patient calling and states he is still having issues sleeping. He states he is only getting about 2 hours of sleep at night. Patient is asking that we call his wife with any medication changes.

## 2021-04-12 RX ORDER — BUSPIRONE HYDROCHLORIDE 10 MG/1
TABLET ORAL
Qty: 180 TABLET | Refills: 3 | Status: SHIPPED | OUTPATIENT
Start: 2021-04-12 | End: 2022-01-13 | Stop reason: SDUPTHER

## 2021-04-13 ENCOUNTER — VIRTUAL VISIT (OUTPATIENT)
Dept: FAMILY MEDICINE CLINIC | Age: 71
End: 2021-04-13
Payer: MEDICARE

## 2021-04-13 VITALS
HEIGHT: 70 IN | BODY MASS INDEX: 36.79 KG/M2 | TEMPERATURE: 98.2 F | SYSTOLIC BLOOD PRESSURE: 120 MMHG | DIASTOLIC BLOOD PRESSURE: 76 MMHG | WEIGHT: 257 LBS

## 2021-04-13 DIAGNOSIS — Z00.00 ROUTINE GENERAL MEDICAL EXAMINATION AT A HEALTH CARE FACILITY: Primary | ICD-10-CM

## 2021-04-13 PROCEDURE — 4040F PNEUMOC VAC/ADMIN/RCVD: CPT | Performed by: FAMILY MEDICINE

## 2021-04-13 PROCEDURE — 3017F COLORECTAL CA SCREEN DOC REV: CPT | Performed by: FAMILY MEDICINE

## 2021-04-13 PROCEDURE — 1123F ACP DISCUSS/DSCN MKR DOCD: CPT | Performed by: FAMILY MEDICINE

## 2021-04-13 PROCEDURE — G0439 PPPS, SUBSEQ VISIT: HCPCS | Performed by: FAMILY MEDICINE

## 2021-04-13 RX ORDER — TORSEMIDE 20 MG/1
60 TABLET ORAL DAILY
COMMUNITY
Start: 2021-03-12 | End: 2021-10-12 | Stop reason: DRUGHIGH

## 2021-04-13 ASSESSMENT — LIFESTYLE VARIABLES
HOW OFTEN DO YOU HAVE A DRINK CONTAINING ALCOHOL: 1
HOW OFTEN DURING THE LAST YEAR HAVE YOU FOUND THAT YOU WERE NOT ABLE TO STOP DRINKING ONCE YOU HAD STARTED: 0
AUDIT TOTAL SCORE: 1
AUDIT-C TOTAL SCORE: 1
HOW OFTEN DURING THE LAST YEAR HAVE YOU FAILED TO DO WHAT WAS NORMALLY EXPECTED FROM YOU BECAUSE OF DRINKING: 0
HOW OFTEN DURING THE LAST YEAR HAVE YOU HAD A FEELING OF GUILT OR REMORSE AFTER DRINKING: 0
HOW OFTEN DURING THE LAST YEAR HAVE YOU NEEDED AN ALCOHOLIC DRINK FIRST THING IN THE MORNING TO GET YOURSELF GOING AFTER A NIGHT OF HEAVY DRINKING: 0

## 2021-04-13 ASSESSMENT — PATIENT HEALTH QUESTIONNAIRE - PHQ9
SUM OF ALL RESPONSES TO PHQ QUESTIONS 1-9: 0
SUM OF ALL RESPONSES TO PHQ QUESTIONS 1-9: 0
1. LITTLE INTEREST OR PLEASURE IN DOING THINGS: 0

## 2021-04-13 NOTE — PROGRESS NOTES
Medicare Annual Wellness Visit  Name: Fanny Miranda Date: 2021   MRN: [id number] Sex: Male   Age: 79 y.o. Ethnicity: Non-/Non    : 1950 Race: Anival Garcia is here for Medicare AWV    Screenings for behavioral, psychosocial and functional/safety risks, and cognitive dysfunction are all negative except as indicated below. These results, as well as other patient data from the [address] form, are documented in Flowsheets linked to this Encounter. Allergies   Allergen Reactions    Lisinopril Other (See Comments)     angioedema is what he had. See 2017 ER visit and [facility] hospitalization    Penicillins Hives    Sulfa Antibiotics Hives    Tetanus Toxoids Hives       Prior to Visit Medications    Medication Sig Taking?  Authorizing Provider   torsemide (DEMADEX) 100 MG tablet Take 100 mg by mouth daily Yes Historical Provider, MD   busPIRone (BUSPAR) 10 MG tablet Take 1 tablet by mouth twice daily  Feli Jones MD   traZODone (DESYREL) 100 MG tablet Take 200 mg by mouth nightly  Historical Provider, MD   ferrous sulfate (IRON 325) 325 (65 Fe) MG tablet Take 1 tablet by mouth once daily with breakfast  MO Coley CNP   metoprolol (LOPRESSOR) 100 MG tablet Take 1 tablet by mouth twice daily  Feli Jones MD   furosemide (LASIX) 40 MG tablet TAKE 1 TABLET BY MOUTH TWICE DAILY  Feli Jones MD   allopurinol (ZYLOPRIM) 300 MG tablet TAKE 1 TABLET BY MOUTH ONCE DAILY  Feli Jones MD   rosuvastatin (CRESTOR) 10 MG tablet TAKE 1 TABLET BY MOUTH ONCE DAILY  Feli Jones MD   budesonide-formoterol (SYMBICORT) 160-4.5 MCG/ACT AERO INHALE 1 PUFF BY MOUTH TWICE DAILY  Feli Jones MD   albuterol sulfate HFA (VENTOLIN HFA) 108 (90 Base) MCG/ACT inhaler INHALE TWO PUFFS BY MOUTH EVERY 6 HOURS AS NEEDED FOR WHEEZING  Tracy Ospina MD   clonazePAM (KLONOPIN) 2 MG tablet TAKE ONE TABLET BY MOUTH AT BEDTIME AS NEEDED FOR ANXIETY  Roosvelt Leyden, MD   ipratropium-albuterol (DUONEB) 0.5-2.5 (3) MG/3ML SOLN nebulizer solution Inhale 3 mLs into the lungs every 4 hours  Roosvelt Leyden, MD   TOVIAZ 8 MG TB24 Take 1 tablet by mouth daily  Roosvelt Leyden, MD   Cholecalciferol (VITAMIN D3) 50 MCG (2000 UT) CAPS Take 1 capsule by mouth daily  Historical Provider, MD   Coenzyme Q10 (COQ10) 100 MG CAPS Take 1 capsule by mouth daily  Historical Provider, MD   MYRBETRIQ 50 MG TB24 Take 50 mg by mouth daily   Historical Provider, MD   omeprazole 20 MG EC tablet Take 20 mg by mouth 2 times daily   Historical Provider, MD   vitamin B-12 (CYANOCOBALAMIN) 1000 MCG tablet Take 1 tablet by mouth daily  Roosvelt Leyden, MD   apixaban (ELIQUIS) 5 MG TABS tablet Take 5 mg by mouth 2 times daily  Historical Provider, MD       Past Medical History:   Diagnosis Date    Acid reflux     Acute alcohol intoxication ([facility] [address] 11/20/2017    Acute respiratory failure with hypoxemia ([facility] [address] 11/20/2017    Alcohol withdrawal ([facility] [address] 11/14/2009    Anxiety     CAD (coronary artery disease)     COPD (chronic obstructive pulmonary disease) ([facility] [address]     Hyperlipidemia     Hypertension     LINSEY (obstructive sleep apnea)     PTSD (post-traumatic stress disorder)        Past Surgical History:   Procedure Laterality Date    CARDIAC SURGERY      cabg    CATARACT REMOVAL WITH IMPLANT Left 08/02/2016    PHACO EMULSIFICATION OF CATARACT WITH INTRAOCULAR LENS    CATARACT REMOVAL WITH IMPLANT Right 08/22/2016    COLONOSCOPY  2011    normal    COLONOSCOPY  12/15/2016    EYE SURGERY      cataract bilateral    HERNIA REPAIR      umbilical    KNEE ARTHROSCOPY         Family History   Problem Relation Age of Onset    Cancer Father         skin    Cancer Paternal Cousin         melanoma-thigh       CareTeam (Including outside providers/suppliers regularly involved in providing care):   Patient Care Team:  Suzie Bush MD as PCP - General (Family Medicine)  Ninoska Garcia MD [facility]  Flaco Butler MD as Consulting Physician (Cardiology)  Bulmaro Kaur MD as Consulting Physician (Pulmonology)    Wt Readings from Last 3 Encounters:   04/13/21 257 lb (116.6 kg)   01/28/21 257 lb 6.4 oz (116.8 kg)   10/06/20 244 lb 9.6 oz (110.9 kg)     Vitals:    04/13/21 0945   BP: 120/76   Temp: 98.2 °F (36.8 °C)   Weight: 257 lb (116.6 kg)   Height: 5' 10\" (1.778 m)     Body mass index is 36.88 kg/m². Patient reports vitals. Based upon direct observation of the patient, evaluation of cognition reveals recent and remote memory intact. Patient's complete Health Risk Assessment and screening values have been reviewed and are found in Flowsheets. The following problems were reviewed today and where indicated follow up appointments were made and/or referrals ordered. Positive Risk Factor Screenings with Interventions:         Substance History:  Social History     Tobacco History     Smoking Status  Former Smoker Quit date  1/1/2002 Smoking Frequency  2 packs/day for 40 years ([de-identified] pk yrs) Smoking Tobacco Type  Cigarettes    Smokeless Tobacco Use  Current User Smokeless Tobacco Type  Snuff    Tobacco Comment  07/13/2016 placed in avs          Alcohol History     Alcohol Use Status  Yes Drinks/Week  10-12 Shots of liquor, 0 Standard drinks or equivalent per week Amount  10.0 - 12.0 standard drinks of alcohol/wk Comment  10-12 shots / day          Drug Use     Drug Use Status  No          Sexual Activity     Sexually Active  Not Currently Partners  Female               Alcohol Screening: Audit-C Score: 1  Total Score: 1    A score of 8 or more is associated with harmful or hazardous drinking. A score of 13 or more in women, and 15 or more in men, is likely to indicate alcohol dependence.   Substance Abuse Interventions:  · Alcohol misuse/dependence:  just occasion drink  · none     Health Habits/Nutrition:  Health Habits/Nutrition  Do you exercise for at least 20 minutes 2-3 times per week?: Yes  Have you lost any weight without trying in the past 3 months?: No  Do you eat only one meal per day?: No  Have you seen the dentist within the past year?: Yes  Body mass index: (!) 36.87  Health Habits/Nutrition Interventions:  · Nutritional issues:  educational materials for healthy, well-balanced diet provided       Personalized Preventive Plan   Current Health Maintenance Status  Immunization History   Administered Date(s) Administered    Influenza 10/11/2010    Influenza Vaccine, unspecified formulation 09/13/2017    Influenza Virus Vaccine 10/05/2013, 10/15/2014, 10/29/2015    Influenza Whole 10/05/2013, 10/15/2014    Influenza, High Dose (Fluzone 65 yrs and older) 10/04/2018    Influenza, Quadv, IM, (6 mo and older Fluzone, Flulaval, Fluarix and 3 yrs and older Afluria) 09/13/2017    Influenza, Quadv, adjuvanted, 65 yrs +, IM, PF (Fluad) 10/06/2020    Pneumococcal Conjugate 13-valent ([id number]) 10/29/2015    Pneumococcal Polysaccharide ([id number]) 10/16/2018, 08/20/2019    Zoster Live (Zostavax) 11/11/2013    Zoster Recombinant (Shingrix) 12/07/2019        Health Maintenance   Topic Date Due    AAA screen  Never done    COVID-19 Vaccine (1) Never done   [employer] Visit (AWV)  Never done    Lipid screen  04/05/2022    Potassium monitoring  04/05/2022    Creatinine monitoring  04/05/2022    Colon cancer screen colonoscopy  12/15/2026    Flu vaccine  Completed    Shingles Vaccine  Completed    Pneumococcal 65+ years Vaccine  Completed    Hepatitis C screen  Completed    Hepatitis A vaccine  Aged Out    Hepatitis B vaccine  Aged Out    Hib vaccine  Aged Out    Meningococcal (ACWY) vaccine  Aged Out     Recommendations for Ze-gen Due: see orders and patient instructions/AVS.  .   Recommended screening schedule for the next 5-10 years is provided to the patient in written form: see Patient Instructions/AVS.

## 2021-04-13 NOTE — PATIENT INSTRUCTIONS
Personalized Preventive Plan for Edgar Delgadillo - 4/13/2021  Medicare offers a range of preventive health benefits. Some of the tests and screenings are paid in full while other may be subject to a deductible, co-insurance, and/or copay. Some of these benefits include a comprehensive review of your medical history including lifestyle, illnesses that may run in your family, and various assessments and screenings as appropriate. After reviewing your medical record and screening and assessments performed today your provider may have ordered immunizations, labs, imaging, and/or referrals for you. A list of these orders (if applicable) as well as your Preventive Care list are included within your After Visit Summary for your review. Other Preventive Recommendations:    · A preventive eye exam performed by an eye specialist is recommended every 1-2 years to screen for glaucoma; cataracts, macular degeneration, and other eye disorders. · A preventive dental visit is recommended every 6 months. · Try to get at least 150 minutes of exercise per week or 10,000 steps per day on a pedometer . · Order or download the FREE \"Exercise & Physical Activity: Your Everyday Guide\" from [employer]. Call 8-756.231.3776 or search [employer] online. · You need 2428-3757 mg of calcium and 1522-6323 IU of vitamin D per day. It is possible to meet your calcium requirement with diet alone, but a vitamin D supplement is usually necessary to meet this goal.  · When exposed to the sun, use a sunscreen that protects against both UVA and UVB radiation with an SPF of 30 or greater. Reapply every 2 to 3 hours or after sweating, drying off with a towel, or swimming. · Always wear a seat belt when traveling in a car. Always wear a helmet when riding a bicycle or motorcycle.     Keep Your Memory Mavis Pouch       Many factors can affect your ability to remembera hectic lifestyle, aging, stress, chronic disease, and certain medicines. But, there are steps you can take to sharpen your mind and help preserve your memory. Challenge Your Brain   Regularly challenging your mind may help keeps it in top shape. Good mental exercises include:   Crossword puzzlesUse a dictionary if you need it; you will learn more that way. Brainteasers Try some! Crafts, such as wood working and sewing   Hobbies, such as gardening and building model airplanes   SocializingVisit old friends or join groups to meet new ones. Reading   Learning a new language   Taking a class, whether it be art history or madan chi   TravelingExperience the food, history, and culture of your destination   Learning to use a computer   Going to museums, the theater, or thought-provoking movies   Changing things in your daily life, such as reversing your pattern in the grocery store or brushing your teeth using your nondominant hand   Use Memory Aids   There is no need to remember every detail on your own. These memory aids can help:   Calendars and day planners   Electronic organizers to store all sorts of helpful informationThese devices can \"beep\" to remind you of appointments. A book of days to record birthdays, anniversaries, and other occasions that occur on the same date every year   Detailed \"to-do\" lists and strategically placed sticky notes   Quick \"study\" sessionsBefore a gathering, review who will be there so their names will be fresh in your mind. Establish routinesFor example, keep your keys, wallet, and umbrella in the same place all the time or take medicine with your 8:00 AM glass of juice   Live a Healthy Life   Many actions that will keep your body strong will do the same for your mind. For example:   Talk to Your Doctor About Herbs and Supplements    Malnutrition and vitamin deficiencies can impair your mental function. For example, vitamin B12 deficiency can cause a range of symptoms, including confusion.  But, what if your nutritional needs are being met? Can herbs and supplements still offer a benefit? Researchers have investigated a range of natural remedies, such as ginkgo , ginseng , and the supplement phosphatidylserine (PS). So far, though, the evidence is inconsistent as to whether these products can improve memory or thinking. If you are interested in taking herbs and supplements, talk to your doctor first because they may interact with other medicines that you are taking. Exercise Regularly    Among the many benefits of regular exercise are increased blood flow to the brain and decreased risk of certain diseases that can interfere with memory function. One study found that even moderate exercise has a beneficial effect. Examples of \"moderate\" exercise include:   Playing 18 holes of golf once a week, without a cart   Playing tennis twice a week   Walking one mile per day   Manage Stress    It can be tough to remember what is important when your mind is cluttered. Make time for relaxation. Choose activities that calm you down, and make it routine. Manage Chronic Conditions    Side effects of high blood pressure , diabetes, and heart disease can interfere with mental function. Many of the lifestyle steps discussed here can help manage these conditions. Strive to eat a healthy diet, exercise regularly, get stress under control, and follow your doctor's advice for your condition. Minimize Medications    Talk to your doctor about the medicines that you take. Some may be unnecessary. Also, healthy lifestyle habits may lower the need for certain drugs.      Last Reviewed: April 2010 Dariela Welch MD   Updated: 4/13/2010   ·

## 2021-04-22 RX ORDER — ALBUTEROL SULFATE 90 UG/1
AEROSOL, METERED RESPIRATORY (INHALATION)
Qty: 9 G | Refills: 3 | Status: SHIPPED | OUTPATIENT
Start: 2021-04-22 | End: 2021-07-01

## 2021-06-07 NOTE — PROGRESS NOTES
[facility]) Dermatology  Terrance Holcomb MD  [phone]      Raj Jasso  1950    79 y.o. male     Date of Visit: 6/11/2021    Last Visit: 1yr    Chief Complaint: Skin check, lesion     History of Present Illness:  1. Here for skin check. History of actinic keratoses s/p cryotherapy, efudex (scalp 2014). Unsure of new lesions.   -Does not spend much time in sun. Wears hat and T shirt regularly. 2. Here for mole check. No new moles. No moles changing in size, shape, color. None associated w/ pain, bleeding, pruritus. 3. New issue. Over the past several months, has been dealing w/ CHF exacerbations associated w/ lower leg swelling. During these periods, has experienced a severely pruritic eruption of lower legs. Eruption improves when swelling improves. Review of Systems:  Constitutional: Reports general sense of well-being. Skin: No interval severe sunburns. Allergies: Reviewed and updated. Past Medical History, Surgical History, Medications and Allergies reviewed. Past Medical History:   Diagnosis Date    Acid reflux     Acute alcohol intoxication ([facility] [address] 11/20/2017    [facility]) 11/20/2017    Alcohol withdrawal ([facility] [address] 11/14/2009    Anxiety     CAD (coronary artery disease)     COPD (chronic obstructive pulmonary disease) (HCC)     Hyperlipidemia     Hypertension     LINSEY (obstructive sleep apnea)     PTSD (post-traumatic stress disorder)      Past Surgical History:   Procedure Laterality Date    CARDIAC SURGERY      cabg    CATARACT REMOVAL WITH IMPLANT Left 08/02/2016    PHACO EMULSIFICATION OF CATARACT WITH INTRAOCULAR LENS    CATARACT REMOVAL WITH IMPLANT Right 08/22/2016    COLONOSCOPY  2011    normal    COLONOSCOPY  12/15/2016    EYE SURGERY      cataract bilateral    HERNIA REPAIR      umbilical    KNEE ARTHROSCOPY         Allergies   Allergen Reactions    Lisinopril Other (See Comments)     angioedema is what he had.   See 12/ 2017 ER visit and [address] hospitalization    Penicillins Hives    Sulfa Antibiotics Hives    Tetanus Toxoids Hives     Outpatient Medications Marked as Taking for the 6/11/21 encounter (Office Visit) with Don Rodriguez MD   Medication Sig Dispense Refill    dapagliflozin (FARXIGA) 5 MG tablet Take 5 mg by mouth every morning      ferrous sulfate (IRON 325) 325 (65 Fe) MG tablet Take 1 tab with breakfast  90 tablet 3    albuterol sulfate  (90 Base) MCG/ACT inhaler INHALE 2 PUFFS BY MOUTH EVERY 6 HOURS AS NEEDED FOR WHEEZING 9 g 3    torsemide (DEMADEX) 100 MG tablet Take 100 mg by mouth daily      busPIRone (BUSPAR) 10 MG tablet Take 1 tablet by mouth twice daily 180 tablet 3    traZODone (DESYREL) 100 MG tablet Take 200 mg by mouth nightly      metoprolol (LOPRESSOR) 100 MG tablet Take 1 tablet by mouth twice daily 180 tablet 3    allopurinol (ZYLOPRIM) 300 MG tablet TAKE 1 TABLET BY MOUTH ONCE DAILY 90 tablet 3    rosuvastatin (CRESTOR) 10 MG tablet TAKE 1 TABLET BY MOUTH ONCE DAILY 90 tablet 3    budesonide-formoterol (SYMBICORT) 160-4.5 MCG/ACT AERO INHALE 1 PUFF BY MOUTH TWICE DAILY 3 Inhaler 3    ipratropium-albuterol (DUONEB) 0.5-2.5 (3) MG/3ML SOLN nebulizer solution Inhale 3 mLs into the lungs every 4 hours 360 mL 2    Cholecalciferol (VITAMIN D3) 50 MCG (2000 UT) CAPS Take 1 capsule by mouth daily      Coenzyme Q10 (COQ10) 100 MG CAPS Take 1 capsule by mouth daily      MYRBETRIQ 50 MG TB24 Take 50 mg by mouth daily       omeprazole 20 MG EC tablet Take 20 mg by mouth 2 times daily       vitamin B-12 (CYANOCOBALAMIN) 1000 MCG tablet Take 1 tablet by mouth daily 90 tablet 1    apixaban (ELIQUIS) 5 MG TABS tablet Take 5 mg by mouth 2 times daily       Social History: [employer]     Physical Examination     The following were examined and determined to be normal: Psych/Neuro, Scalp/hair, Conjunctivae/eyelids, Gums/teeth/lips, Neck, Nails/digits and Genitalia/groin/buttocks. The following were examined and determined to be abnormal: Head/face, Breast/axilla/chest, Abdomen, Back, RUE, LUE, RLE and LLE. -General: Well-appearing, NAD  1. Glabella 1, R antihelix 1, L helix 1 - ill-defined irregularly-shaped roughly-scaling thin pink macule(s)/papule(s)   2. Scattered on the trunk and extremities are multiple well-defined round and oval symmetric smoothly-bordered uniformly brown macules and papules. 3. Both lower legs w/ bkgd 2+ pitting edema. Distal 1/2 of lower legs - ill-defined focally excoriated mildly finely scaling erythematous patches     Assessment and Plan     1. Actinic keratosis(es)  -Edu re: relationship with chronic cumulative sun exposure, low premalignant potential.   -3 lesion(s) treated w/ liquid nitrogen x 2 cycles - Glabella 1, R antihelix 1, L helix 1. Edu re: risk of blister formation, discomfort, scar, hypopigmentation. Discussed wound care w/ vaseline or Aquaphor.    -Reviewed sun protective behavior -- sun avoidance during the peak hours of the day, sun-protective clothing (including hat and sunglasses), OTC sunscreen use (water resistant, broad spectrum, SPF at least 30, need for reapplication every 2 to 3 hours). -Return for full skin exam in 1 year (sooner if indicated)      2. Benign acquired melanocytic nevi  -Recommend monthly self skin exams   -Educated regarding the ABCDEs of melanoma detection   -Call for any new/changing moles or concerning lesions      3. Stasis dermatitis of lower legs - flared   -Pt was educated re: pathobiology, relationship b/t edema and dermatitis, chronicity, use of topical steroids for flares  -Triamcinolone 0.1% oint bid prn.  Edu re: sparing use, atrophy, striae, hypopigmentation, telangiectasias.  -Pt has f/u w/ cardiologist to manage CHF, edema

## 2021-06-10 DIAGNOSIS — D50.0 IRON DEFICIENCY ANEMIA DUE TO CHRONIC BLOOD LOSS: ICD-10-CM

## 2021-06-10 RX ORDER — FERROUS SULFATE 325(65) MG
TABLET ORAL
Qty: 90 TABLET | Refills: 3 | Status: ON HOLD | OUTPATIENT
Start: 2021-06-10 | End: 2022-06-29 | Stop reason: HOSPADM

## 2021-06-11 ENCOUNTER — OFFICE VISIT (OUTPATIENT)
Dept: DERMATOLOGY | Age: 71
End: 2021-06-11
Payer: MEDICARE

## 2021-06-11 VITALS — TEMPERATURE: 99.1 F

## 2021-06-11 DIAGNOSIS — I87.2 STASIS DERMATITIS OF BOTH LEGS: ICD-10-CM

## 2021-06-11 DIAGNOSIS — L57.0 ACTINIC KERATOSES: ICD-10-CM

## 2021-06-11 DIAGNOSIS — D22.9 MULTIPLE BENIGN NEVI: Primary | ICD-10-CM

## 2021-06-11 DIAGNOSIS — Z12.83 SCREENING EXAM FOR SKIN CANCER: ICD-10-CM

## 2021-06-11 PROCEDURE — 4040F PNEUMOC VAC/ADMIN/RCVD: CPT | Performed by: DERMATOLOGY

## 2021-06-11 PROCEDURE — 3017F COLORECTAL CA SCREEN DOC REV: CPT | Performed by: DERMATOLOGY

## 2021-06-11 PROCEDURE — 17003 DESTRUCT PREMALG LES 2-14: CPT | Performed by: DERMATOLOGY

## 2021-06-11 PROCEDURE — G8417 CALC BMI ABV UP PARAM F/U: HCPCS | Performed by: DERMATOLOGY

## 2021-06-11 PROCEDURE — 99214 OFFICE O/P EST MOD 30 MIN: CPT | Performed by: DERMATOLOGY

## 2021-06-11 PROCEDURE — 4004F PT TOBACCO SCREEN RCVD TLK: CPT | Performed by: DERMATOLOGY

## 2021-06-11 PROCEDURE — G8427 DOCREV CUR MEDS BY ELIG CLIN: HCPCS | Performed by: DERMATOLOGY

## 2021-06-11 PROCEDURE — 17000 DESTRUCT PREMALG LESION: CPT | Performed by: DERMATOLOGY

## 2021-06-11 PROCEDURE — 1123F ACP DISCUSS/DSCN MKR DOCD: CPT | Performed by: DERMATOLOGY

## 2021-06-11 RX ORDER — FLUOCINONIDE 0.5 MG/G
OINTMENT TOPICAL
Qty: 60 G | Refills: 1 | Status: ON HOLD | OUTPATIENT
Start: 2021-06-11 | End: 2022-06-24

## 2021-06-22 ENCOUNTER — OFFICE VISIT (OUTPATIENT)
Dept: FAMILY MEDICINE CLINIC | Age: 71
End: 2021-06-22
Payer: MEDICARE

## 2021-06-22 VITALS
SYSTOLIC BLOOD PRESSURE: 112 MMHG | OXYGEN SATURATION: 95 % | DIASTOLIC BLOOD PRESSURE: 56 MMHG | HEART RATE: 72 BPM | BODY MASS INDEX: 35.01 KG/M2 | WEIGHT: 244 LBS

## 2021-06-22 DIAGNOSIS — L29.9 ITCHING: Primary | ICD-10-CM

## 2021-06-22 DIAGNOSIS — T14.8XXA SKIN ABRASION: ICD-10-CM

## 2021-06-22 DIAGNOSIS — G62.9 NEUROPATHY: ICD-10-CM

## 2021-06-22 PROCEDURE — 4040F PNEUMOC VAC/ADMIN/RCVD: CPT | Performed by: NURSE PRACTITIONER

## 2021-06-22 PROCEDURE — 99213 OFFICE O/P EST LOW 20 MIN: CPT | Performed by: NURSE PRACTITIONER

## 2021-06-22 PROCEDURE — 3017F COLORECTAL CA SCREEN DOC REV: CPT | Performed by: NURSE PRACTITIONER

## 2021-06-22 PROCEDURE — 1123F ACP DISCUSS/DSCN MKR DOCD: CPT | Performed by: NURSE PRACTITIONER

## 2021-06-22 PROCEDURE — 4004F PT TOBACCO SCREEN RCVD TLK: CPT | Performed by: NURSE PRACTITIONER

## 2021-06-22 PROCEDURE — G8427 DOCREV CUR MEDS BY ELIG CLIN: HCPCS | Performed by: NURSE PRACTITIONER

## 2021-06-22 PROCEDURE — G8417 CALC BMI ABV UP PARAM F/U: HCPCS | Performed by: NURSE PRACTITIONER

## 2021-06-22 RX ORDER — HYDROXYZINE HYDROCHLORIDE 25 MG/1
25 TABLET, FILM COATED ORAL EVERY 8 HOURS PRN
Qty: 30 TABLET | Refills: 0 | Status: SHIPPED | OUTPATIENT
Start: 2021-06-22 | End: 2021-07-02

## 2021-06-22 ASSESSMENT — ENCOUNTER SYMPTOMS
BLOOD IN STOOL: 0
PHOTOPHOBIA: 0
ABDOMINAL PAIN: 0
COUGH: 0
SHORTNESS OF BREATH: 0
TROUBLE SWALLOWING: 0
VOMITING: 0
BACK PAIN: 0
STRIDOR: 0
EYE REDNESS: 0
VOICE CHANGE: 0
CONSTIPATION: 0
NAUSEA: 0
EYE PAIN: 0
EYE DISCHARGE: 0
SINUS PAIN: 0
SORE THROAT: 0
DIARRHEA: 0
CHEST TIGHTNESS: 0
CHOKING: 0
WHEEZING: 0
EYE ITCHING: 0
RHINORRHEA: 0
SINUS PRESSURE: 0
COLOR CHANGE: 1

## 2021-06-22 NOTE — PROGRESS NOTES
TAKE 1 TABLET BY MOUTH ONCE DAILY Yes Favio Montes MD   budesonide-formoterol (SYMBICORT) 160-4.5 MCG/ACT AERO INHALE 1 PUFF BY MOUTH TWICE DAILY Yes Favio Montes MD   ipratropium-albuterol (DUONEB) 0.5-2.5 (3) MG/3ML SOLN nebulizer solution Inhale 3 mLs into the lungs every 4 hours Yes Toribio Kaplan MD   Cholecalciferol (VITAMIN D3) 50 MCG (2000 UT) CAPS Take 1 capsule by mouth daily Yes Historical Provider, MD   Coenzyme Q10 (COQ10) 100 MG CAPS Take 1 capsule by mouth daily Yes Historical Provider, MD   MYRBETRIQ 50 MG TB24 Take 50 mg by mouth daily  Yes Historical Provider, MD   omeprazole 20 MG EC tablet Take 20 mg by mouth 2 times daily  Yes Historical Provider, MD   vitamin B-12 (CYANOCOBALAMIN) 1000 MCG tablet Take 1 tablet by mouth daily Yes Toribio Kaplan MD   apixaban (ELIQUIS) 5 MG TABS tablet Take 5 mg by mouth 2 times daily Yes Historical Provider, MD   fluocinonide (LIDEX) 0.05 % ointment Apply sparingly to affected area(s) bid prn for flares. Do not apply on cleared skin. Patient not taking: Reported on 6/22/2021  Kayden Cadet MD       Allergies   Allergen Reactions    Lisinopril Other (See Comments)     angioedema is what he had. See 12/ 2017 ER visit and [facility] hospitalization    Penicillins Hives    Sulfa Antibiotics Hives    Tetanus Toxoids Hives       OBJECTIVE:      BP Readings from Last 2 Encounters:   06/22/21 (!) 112/56   04/13/21 120/76       Wt Readings from Last 3 Encounters:   06/22/21 244 lb (110.7 kg)   04/13/21 257 lb (116.6 kg)   01/28/21 257 lb 6.4 oz (116.8 kg)       - Redness similar to right leg. Physical Exam  Vitals reviewed. Constitutional:       General: He is not in acute distress. Appearance: Normal appearance. He is well-developed. HENT:      Head: Normocephalic and atraumatic.       Right Ear: Hearing and external ear normal.      Left Ear: Hearing and external ear normal.      Nose: Nose normal.      Right Sinus: No maxillary sinus tenderness or frontal sinus tenderness. Left Sinus: No maxillary sinus tenderness or frontal sinus tenderness. Mouth/Throat:      Pharynx: No oropharyngeal exudate. Eyes:      General:         Right eye: No discharge. Left eye: No discharge. Neck:      Thyroid: No thyromegaly. Vascular: No JVD. Trachea: No tracheal deviation. Cardiovascular:      Rate and Rhythm: Normal rate and regular rhythm. Heart sounds: Normal heart sounds. No murmur heard. No friction rub. Pulmonary:      Effort: Pulmonary effort is normal. No respiratory distress. Breath sounds: Normal breath sounds. No stridor. No decreased breath sounds, wheezing, rhonchi or rales. Abdominal:      Palpations: There is no mass. Musculoskeletal:         General: No tenderness. Normal range of motion. Cervical back: Normal range of motion. Lymphadenopathy:      Cervical: No cervical adenopathy. Skin:     General: Skin is warm and dry. Capillary Refill: Capillary refill takes less than 2 seconds. Findings: Erythema present. No rash. Comments: Abrasion      Neurological:      Mental Status: He is alert and oriented to person, place, and time. Sensory: Sensation is intact. Motor: Motor function is intact. Coordination: Coordination normal.   Psychiatric:         Attention and Perception: Attention and perception normal.         Mood and Affect: Mood normal.         Speech: Speech normal.         Behavior: Behavior normal. Behavior is cooperative. Thought Content: Thought content normal.         Cognition and Memory: Cognition normal.         Judgment: Judgment normal.           ASSESSMENT/PLAN:    1. Itching    - Itching significantly improved. Will keep this med ready if he needs it. Will only take for itching episode. Advised to not take this with his trazodone. - hydrOXYzine (ATARAX) 25 MG tablet;  Take 1 tablet by mouth every 8 hours as needed for Itching  Dispense: 30 tablet; Refill: 0    2. Skin abrasion    -Healing well. Advised to keep dry and call if site becomes more red, oozing or he feels worse. Educated on signs and symptoms of infection. He just finished abx yesterday. 3. Neuropathy    - Educated about Gabapentin and its use. He was prescribed this by Podiatry and he was concerned with all the other medicaionts he takes and the safety of this. Reviewed recent labs and kidney function with pt. Advised to try this with someone else present in the morning and to not take it with his Trazodone for concern for decreased respiration. He verbalized understanding. Follow up if symptoms do not improve or worsen. If the patient becomes short of breath go straight to the ER or call 911.

## 2021-08-16 DIAGNOSIS — E78.5 HYPERLIPIDEMIA WITH TARGET LDL LESS THAN 70: ICD-10-CM

## 2021-08-17 RX ORDER — ROSUVASTATIN CALCIUM 10 MG/1
TABLET, COATED ORAL
Qty: 90 TABLET | Refills: 3 | Status: SHIPPED | OUTPATIENT
Start: 2021-08-17 | End: 2022-04-20

## 2021-09-29 ENCOUNTER — TELEPHONE (OUTPATIENT)
Dept: FAMILY MEDICINE CLINIC | Age: 71
End: 2021-09-29

## 2021-09-29 DIAGNOSIS — R05.9 COUGH: Primary | ICD-10-CM

## 2021-09-29 LAB
INTERNAL QC: NORMAL
SARS-COV-2, NAA: POSITIVE

## 2021-09-29 NOTE — TELEPHONE ENCOUNTER
Patient with bad cough since Thursday. Wanting covid test at [facility].  Per Sandeep Ferrer ok to send orders

## 2021-10-12 ENCOUNTER — OFFICE VISIT (OUTPATIENT)
Dept: FAMILY MEDICINE CLINIC | Age: 71
End: 2021-10-12
Payer: MEDICARE

## 2021-10-12 VITALS
BODY MASS INDEX: 32.1 KG/M2 | WEIGHT: 224.2 LBS | SYSTOLIC BLOOD PRESSURE: 120 MMHG | HEIGHT: 70 IN | HEART RATE: 78 BPM | OXYGEN SATURATION: 96 % | DIASTOLIC BLOOD PRESSURE: 54 MMHG | TEMPERATURE: 97.1 F

## 2021-10-12 DIAGNOSIS — J44.9 CHRONIC OBSTRUCTIVE PULMONARY DISEASE, UNSPECIFIED COPD TYPE (HCC): ICD-10-CM

## 2021-10-12 DIAGNOSIS — R51.9 SINUS HEADACHE: Primary | ICD-10-CM

## 2021-10-12 DIAGNOSIS — I48.0 PAROXYSMAL ATRIAL FIBRILLATION (HCC): ICD-10-CM

## 2021-10-12 DIAGNOSIS — I50.32 CHRONIC DIASTOLIC CONGESTIVE HEART FAILURE (HCC): ICD-10-CM

## 2021-10-12 DIAGNOSIS — F10.20 ALCOHOLISM (HCC): ICD-10-CM

## 2021-10-12 DIAGNOSIS — I10 PRIMARY HYPERTENSION: Chronic | ICD-10-CM

## 2021-10-12 DIAGNOSIS — I25.10 CORONARY ARTERY DISEASE INVOLVING NATIVE CORONARY ARTERY OF NATIVE HEART WITHOUT ANGINA PECTORIS: Chronic | ICD-10-CM

## 2021-10-12 DIAGNOSIS — R35.0 URINARY FREQUENCY: ICD-10-CM

## 2021-10-12 PROCEDURE — 99214 OFFICE O/P EST MOD 30 MIN: CPT | Performed by: FAMILY MEDICINE

## 2021-10-12 PROCEDURE — 1123F ACP DISCUSS/DSCN MKR DOCD: CPT | Performed by: FAMILY MEDICINE

## 2021-10-12 PROCEDURE — 3017F COLORECTAL CA SCREEN DOC REV: CPT | Performed by: FAMILY MEDICINE

## 2021-10-12 PROCEDURE — 4004F PT TOBACCO SCREEN RCVD TLK: CPT | Performed by: FAMILY MEDICINE

## 2021-10-12 PROCEDURE — G8417 CALC BMI ABV UP PARAM F/U: HCPCS | Performed by: FAMILY MEDICINE

## 2021-10-12 PROCEDURE — G8484 FLU IMMUNIZE NO ADMIN: HCPCS | Performed by: FAMILY MEDICINE

## 2021-10-12 PROCEDURE — 3023F SPIROM DOC REV: CPT | Performed by: FAMILY MEDICINE

## 2021-10-12 PROCEDURE — 4040F PNEUMOC VAC/ADMIN/RCVD: CPT | Performed by: FAMILY MEDICINE

## 2021-10-12 PROCEDURE — G8427 DOCREV CUR MEDS BY ELIG CLIN: HCPCS | Performed by: FAMILY MEDICINE

## 2021-10-12 PROCEDURE — G8926 SPIRO NO PERF OR DOC: HCPCS | Performed by: FAMILY MEDICINE

## 2021-10-12 RX ORDER — FLUTICASONE PROPIONATE 50 MCG
2 SPRAY, SUSPENSION (ML) NASAL DAILY
Qty: 16 G | Refills: 3 | Status: SHIPPED | OUTPATIENT
Start: 2021-10-12 | End: 2022-04-20

## 2021-10-12 RX ORDER — TORSEMIDE 20 MG/1
10 TABLET ORAL DAILY
Qty: 30 TABLET | Refills: 0 | Status: ON HOLD
Start: 2021-10-12 | End: 2022-06-29 | Stop reason: SDUPTHER

## 2021-10-12 ASSESSMENT — ENCOUNTER SYMPTOMS
SINUS PRESSURE: 1
SHORTNESS OF BREATH: 0
SINUS PAIN: 1

## 2021-10-12 NOTE — PATIENT INSTRUCTIONS
Patient Education        Stopping Smokeless Tobacco Use: Care Instructions  Your Care Instructions     Smokeless tobacco comes in many forms, such as snuff and chewing tobacco:  · Snuff is finely ground tobacco sold in cans or pouches. Most of the time, snuff is used by putting a \"pinch\" or \"dip\" between the lower lip or cheek and the gum. · Chewing tobacco is sold as loose leaves, plugs, or twists. It is chewed or placed between the cheek and the gum or teeth. There are plenty of reasons to stop using smokeless tobacco. These products are harmful. They are not risk-free alternatives to smoking. Smokeless tobacco contains nicotine, which is addicting. Though using smokeless tobacco is less harmful than smoking cigarettes, it can cause serious health problems, such as:  · White patches or red sores in your mouth that can turn into mouth cancer involving the lip, tongue, or cheek. · Tooth loss and other dental problems. · Gum disease. Your gums may pull away from your teeth and not grow back. People who use smokeless tobacco crave the nicotine in it. Giving up smokeless tobacco is much harder than simply changing a habit. Your body has to stop craving the nicotine. It is hard to quit, but you can do it. Many tools are available for people who want to quit using smokeless tobacco. You may find that combining tools works best for you. There are several steps to quitting. First you get ready to quit. Then you get support to help you. After that, you learn new skills and behaviors to quit. For many people, a necessary step is getting and using medicine. Your doctor will help you set up the plan that best meets your needs. You may want to attend a tobacco cessation program. When you choose a program, look for one that has proven success. Ask your doctor for ideas.  You will greatly increase your chances of success if you take medicine as well as get counseling or join a cessation program.  Some of the changes you feel when you first quit smokeless tobacco are uncomfortable. Your body will miss the nicotine at first, and you may feel short-tempered and grumpy. You may have trouble sleeping or concentrating. Medicine can help you deal with these symptoms. You may struggle with changing your habits and rituals. The last step is the tricky one: Be prepared for the urge to use smokeless tobacco to continue for a time. This is a lot to deal with, but keep at it. You will feel better. Follow-up care is a key part of your treatment and safety. Be sure to make and go to all appointments, and call your doctor if you are having problems. It's also a good idea to know your test results and keep a list of the medicines you take. How can you care for yourself at home? · Ask your family, friends, and coworkers for support. You have a better chance of quitting if you have help and support. · Join a support group for people who are trying to quit using smokeless tobacco.  · Set a quit date. Pick your date carefully so that it is not right in the middle of a big deadline or stressful time. After you quit, do not use smokeless tobacco even once. Get rid of all spit cups, cans, and pouches after your last use. Clean your house and your clothes so that they do not smell of tobacco.  · Learn how to be a non-user. Think about ways you can avoid those things that make you reach for tobacco.  ? Learn some ways to deal with cravings, like calling a friend or going for a walk. Cravings often pass. ? Avoid situations that put you at greatest risk for using smokeless tobacco. For some people, it is hard to spend time with friends without dipping or chewing. For others, they might skip a coffee break with coworkers who smoke or use smokeless tobacco.  ? Change your daily routine. Take a different route to work, or eat a meal in a different place. · Cut down on stress.  Calm yourself or release tension by doing an activity you enjoy, such as reading

## 2021-10-12 NOTE — PROGRESS NOTES
Chief Complaint   Patient presents with    Headache       HPI:  Yolette Sloan is a 70 y.o. (: 1950) here today   for headache above both eyes since having COVID. HPI  Had covid approx 2 weeks ago. No loss of taste or smell. Breathing has been ok. Has had headache and nasal congestion since covid. No prior hx of headaches. Headache over left eye, occas over both eyes. Has cut back sig on etoh. Has been on diuretic. Has lost weight. Swelling improved. Diuretic dose dec approx 1 mo ago. Stopped eliquis. holter done in the recent past. No sig afib recently. Still urinary freq despite lower diuretic dose. Has had sig weight loss as above     Patient's medications, allergies, past medical, surgical, social and family histories were reviewed and updated asappropriate. ROS:  Review of Systems   Constitutional: Negative for fever. HENT: Positive for congestion, sinus pressure and sinus pain. Respiratory: Negative for shortness of breath. Prior to Visit Medications    Medication Sig Taking? Authorizing Provider   torsemide (DEMADEX) 20 MG tablet Take 0.5 tablets by mouth daily Yes Román Serna MD   fluticasone [facility]) 50 MCG/ACT nasal spray 2 sprays by Nasal route daily Yes Román Serna MD   rosuvastatin (CRESTOR) 10 MG tablet Take 1 tablet by mouth once daily Yes MO Robert - CNP   albuterol sulfate  (90 Base) MCG/ACT inhaler INHALE 2 PUFFS BY MOUTH EVERY 6 HOURS AS NEEDED FOR WHEEZING Yes Román Serna MD   dapagliflozin (FARXIGA) 5 MG tablet Take 5 mg by mouth every morning Yes Historical Provider, MD   fluocinonide (LIDEX) 0.05 % ointment Apply sparingly to affected area(s) bid prn for flares. Do not apply on cleared skin.  Yes Edenilson Franklin MD   ferrous sulfate (IRON 325) 325 (65 Fe) MG tablet Take 1 tab with breakfast  Yes Román Serna MD   busPIRone (BUSPAR) 10 MG tablet Take 1 tablet by mouth twice daily Yes Román Serna MD traZODone (DESYREL) 100 MG tablet Take 200 mg by mouth nightly Yes Historical Provider, MD   metoprolol (LOPRESSOR) 100 MG tablet Take 1 tablet by mouth twice daily  Patient taking differently: Take 50 mg by mouth 2 times daily Take 1 tablet by mouth twice daily Yes Sharon Mae MD   allopurinol (ZYLOPRIM) 300 MG tablet TAKE 1 TABLET BY MOUTH ONCE DAILY Yes Sharon Mae MD   budesonide-formoterol (SYMBICORT) 160-4.5 MCG/ACT AERO INHALE 1 PUFF BY MOUTH TWICE DAILY Yes Sharon Mae MD   ipratropium-albuterol (DUONEB) 0.5-2.5 (3) MG/3ML SOLN nebulizer solution Inhale 3 mLs into the lungs every 4 hours Yes Jonathon Agee MD   Cholecalciferol (VITAMIN D3) 50 MCG (2000 UT) CAPS Take 1 capsule by mouth daily Yes Historical Provider, MD   Coenzyme Q10 (COQ10) 100 MG CAPS Take 1 capsule by mouth daily Yes Historical Provider, MD   MYRBETRIQ 50 MG TB24 Take 50 mg by mouth daily  Yes Historical Provider, MD   omeprazole 20 MG EC tablet Take 20 mg by mouth 2 times daily  Yes Historical Provider, MD   vitamin B-12 (CYANOCOBALAMIN) 1000 MCG tablet Take 1 tablet by mouth daily Yes Jonathon Agee MD       Allergies   Allergen Reactions    Lisinopril Other (See Comments)     angioedema is what he had. See 12/ 2017 ER visit and [facility] hospitalization    Penicillins Hives    Sulfa Antibiotics Hives    Tetanus Toxoids Hives       OBJECTIVE:    BP (!) 120/54   Pulse 78   Temp 97.1 °F (36.2 °C)   Ht 5' 10\" (1.778 m)   Wt 224 lb 3.2 oz (101.7 kg)   SpO2 96%   BMI 32.17 kg/m²     BP Readings from Last 2 Encounters:   10/12/21 (!) 120/54   06/22/21 (!) 112/56       Wt Readings from Last 3 Encounters:   10/12/21 224 lb 3.2 oz (101.7 kg)   06/22/21 244 lb (110.7 kg)   04/13/21 257 lb (116.6 kg)       Physical Exam  Constitutional:       Appearance: Normal appearance. HENT:      Head: Normocephalic and atraumatic.       Nose:      Right Sinus: No maxillary sinus tenderness or frontal sinus tenderness. Left Sinus: Frontal sinus tenderness present. No maxillary sinus tenderness. Eyes:      Extraocular Movements: Extraocular movements intact. Cardiovascular:      Rate and Rhythm: Normal rate and regular rhythm. Pulmonary:      Effort: Pulmonary effort is normal.      Breath sounds: Normal breath sounds. Abdominal:      Palpations: Abdomen is soft. Tenderness: There is no abdominal tenderness. Skin:     General: Skin is warm and dry. Neurological:      General: No focal deficit present. Mental Status: He is alert. Psychiatric:         Mood and Affect: Mood normal.         Behavior: Behavior normal.           ASSESSMENT/PLAN:     1. Sinus headache  S/p covid. Trial of flonase. Consider abx if fails to improve  - fluticasone (FLONASE) 50 MCG/ACT nasal spray; 2 sprays by Nasal route daily  Dispense: 16 g; Refill: 3    2. Chronic obstructive pulmonary disease, unspecified COPD type ([facility] [address]  Near baseline. 3. Alcoholism ([facility] [address]  Has cut back. 4. Coronary artery disease involving native coronary artery of native heart without angina pectoris  F/u w/ cardio as sched. - CBC Auto Differential; Future    5. Primary hypertension  The current medical regimen is effective;  continue present plan and medications. Rpt labs  - COMPREHENSIVE METABOLIC PANEL; Future  - Lipid Panel; Future    6. Paroxysmal atrial fibrillation (HCC)  No afib recently. Off eliquis    7. Chronic diastolic congestive heart failure (HCC)  Overall doing better. Cardio recently lowered dose of diuretic. Rpt labs as planned. - CBC Auto Differential; Future    8. Urinary frequency  fam hx of prostate cancer. Has had weight loss. Suspect sxs related to diuretic, but check labs as below. - PSA, Prostatic Specific Antigen;  Future

## 2021-10-18 LAB
A/G RATIO: 1.1 G/DL (ref 1–2.5)
ALBUMIN SERPL-MCNC: 3.9 G/DL (ref 3.5–5)
ALP BLD-CCNC: 162 U/L (ref 38–126)
ALT SERPL-CCNC: 50 U/L (ref 0–49)
ANION GAP SERPL CALCULATED.3IONS-SCNC: 11.1 MMOL/L (ref 8–16)
AST SERPL-CCNC: 90 U/L (ref 17–59)
BASOPHILS RELATIVE PERCENT: 0.7 % (ref 0–1)
BILIRUB SERPL-MCNC: 1.1 MG/DL (ref 0.2–1.3)
BUN BLDV-MCNC: 12 MG/DL (ref 9–20)
CALCIUM SERPL-MCNC: 9.1 MG/DL (ref 8.6–10.3)
CHLORIDE BLD-SCNC: 97 MMOL/L (ref 98–107)
CHOLESTEROL, TOTAL: 158 MG/DL (ref 0–200)
CO2: 32 MMOL/L (ref 22–30)
CREAT SERPL-MCNC: 0.9 MG/DL (ref 0.66–1.25)
EOSINOPHILS RELATIVE PERCENT: 2.9 % (ref 0–5)
ERYTHROCYTE DISTRIBUTION WIDTH RBC RATIO: 15.6 % (ref 11.6–14.8)
GFR CALCULATED: 83
GLOBULIN: 3.3 G/DL (ref 2–3.5)
GLUCOSE BLD-MCNC: 82 MG/DL (ref 74–100)
GRANULOCYTE ABSOLUTE COUNT: 2.6 X(10)3/UL (ref 1.8–7.2)
HCT VFR BLD CALC: 40.2 % (ref 37.4–53.8)
HDLC SERPL-MCNC: 96 MG/DL (ref 40–59)
HEMOGLOBIN: 12.8 G/DL (ref 13.2–16.5)
IMMATURE GRANULOCYTES %: 0.2 % (ref 0–0.5)
LDL CHOLESTEROL DIRECT: 51.2 MG/DL
LDL/HDL RATIO: 0.5
LYMPHOCYTES ABSOLUTE: 0.8 X(10)3/UL (ref 1.1–2.7)
LYMPHOCYTES RELATIVE PERCENT: 18.6 % (ref 15–45)
MCH RBC QN AUTO: 30 PG (ref 27.7–33.3)
MCHC RBC AUTO-ENTMCNC: 31.8 G/DL (ref 32.8–35)
MCV RBC AUTO: 94.4 FL (ref 80.5–96.9)
MONOCYTES RELATIVE PERCENT: 18.3 % (ref 0–12)
NEUTROPHILS/100 LEUKOCYTES: 59.3 % (ref 40–70)
PLATELETS: 99 X1000 (ref 129–332)
POTASSIUM SERPL-SCNC: 4.1 MMOL/L (ref 3.4–5.1)
PROSTATE SPECIFIC ANTIGEN: 2.2 NG/ML (ref 0–3.9)
RBC # BLD: 4.26 X1000000 (ref 4.16–5.62)
SODIUM BLD-SCNC: 136 MMOL/L (ref 137–145)
TOTAL PROTEIN: 7.2 G/DL (ref 6.3–8.2)
TRIGL SERPL-MCNC: 54 MG/DL (ref 0–149)
VLDLC SERPL CALC-MCNC: 10.8 MG/DL (ref 10–50)
WBC # BLD: 4.4 X1000 (ref 5.4–9.9)

## 2021-10-19 DIAGNOSIS — R97.20 ELEVATED PSA: Primary | ICD-10-CM

## 2021-10-19 DIAGNOSIS — R79.89 ELEVATED LFTS: Primary | ICD-10-CM

## 2021-10-19 DIAGNOSIS — R97.20 ELEVATED PSA: ICD-10-CM

## 2021-10-19 DIAGNOSIS — F10.20 ALCOHOLISM (HCC): ICD-10-CM

## 2021-10-19 NOTE — RESULT ENCOUNTER NOTE
PSA is normal, but it is higher than the been in the remote past.  Given change, consider repeat PSA in 6 months. Elevation of liver function tests noted. Those were fairly normal in January. Recommend eliminate alcohol consumption. Also recommend ultrasound of abdomen to include liver and spleen for further evaluation. Repeat liver function tests in 4 to 6 weeks. If not significantly improved, will need referral back to the liver specialist.  Lipids fairly well controlled. Mild anemia has improved from last check. Platelets again low, similar to what they were 8 months ago. This may be related to liver issues as well. See above regarding ultrasound of the spleen.

## 2021-10-28 NOTE — RESULT ENCOUNTER NOTE
Imaging suboptimal.  Within limitations of the study, fatty liver noted and some findings concerning for cirrhosis. Possible mild enlargement of the spleen suggested as well. Recommend work on eliminating etoh use.   Will likely need additional imaging, but, given labs and u/s result, rec referral to liver specialist.

## 2021-11-11 ENCOUNTER — HOSPITAL ENCOUNTER (OUTPATIENT)
Age: 71
Discharge: HOME | End: 2021-11-11
Payer: MEDICARE

## 2021-11-11 LAB
A/G RATIO: 1.1 (ref 1.1–2.2)
ALBUMIN SERPL-MCNC: 3.9 G/DL (ref 3.4–5)
ALP BLD-CCNC: 146 U/L (ref 40–129)
ALT SERPL-CCNC: 60 U/L (ref 10–40)
ANION GAP SERPL CALCULATED.3IONS-SCNC: 17 MMOL/L (ref 3–16)
AST SERPL-CCNC: 102 U/L (ref 15–37)
BASOPHILS ABSOLUTE: 0 K/UL (ref 0–0.2)
BASOPHILS RELATIVE PERCENT: 0.9 %
BILIRUB SERPL-MCNC: 0.8 MG/DL (ref 0–1)
BUN BLDV-MCNC: 8 MG/DL (ref 7–20)
CALCIUM SERPL-MCNC: 9.5 MG/DL (ref 8.3–10.6)
CHLORIDE BLD-SCNC: 96 MMOL/L (ref 99–110)
CO2: 27 MMOL/L (ref 21–32)
CREAT SERPL-MCNC: 0.9 MG/DL (ref 0.8–1.3)
EOSINOPHILS ABSOLUTE: 0.1 K/UL (ref 0–0.6)
EOSINOPHILS RELATIVE PERCENT: 3.4 %
FERRITIN: 116.1 NG/ML (ref 30–400)
GFR AFRICAN AMERICAN: >60
GFR NON-AFRICAN AMERICAN: >60
GLUCOSE BLD-MCNC: 100 MG/DL (ref 70–99)
HAV IGM SER IA-ACNC: NORMAL
HCT VFR BLD CALC: 43 % (ref 40.5–52.5)
HEMOGLOBIN: 14.1 G/DL (ref 13.5–17.5)
HEPATITIS B CORE IGM ANTIBODY: NORMAL
HEPATITIS B SURFACE ANTIGEN INTERPRETATION: NORMAL
HEPATITIS C ANTIBODY INTERPRETATION: NORMAL
INR BLD: 1.12 (ref 0.88–1.12)
IRON SATURATION: ABNORMAL % (ref 20–50)
IRON: 294 UG/DL (ref 59–158)
LIPASE: 72 U/L (ref 13–60)
LYMPHOCYTES ABSOLUTE: 0.6 K/UL (ref 1–5.1)
LYMPHOCYTES RELATIVE PERCENT: 15.5 %
MCH RBC QN AUTO: 30.7 PG (ref 26–34)
MCHC RBC AUTO-ENTMCNC: 32.7 G/DL (ref 31–36)
MCV RBC AUTO: 93.8 FL (ref 80–100)
MONOCYTES ABSOLUTE: 0.6 K/UL (ref 0–1.3)
MONOCYTES RELATIVE PERCENT: 15.7 %
NEUTROPHILS ABSOLUTE: 2.5 K/UL (ref 1.7–7.7)
NEUTROPHILS RELATIVE PERCENT: 64.5 %
PDW BLD-RTO: 17 % (ref 12.4–15.4)
PLATELET # BLD: 85 K/UL (ref 135–450)
PLATELET SLIDE REVIEW: ABNORMAL
PMV BLD AUTO: 9.3 FL (ref 5–10.5)
POTASSIUM SERPL-SCNC: 3.5 MMOL/L (ref 3.5–5.1)
PROTHROMBIN TIME: 12.7 SEC (ref 9.9–12.7)
RBC # BLD: 4.59 M/UL (ref 4.2–5.9)
SLIDE REVIEW: ABNORMAL
SODIUM BLD-SCNC: 140 MMOL/L (ref 136–145)
TOTAL IRON BINDING CAPACITY: ABNORMAL UG/DL (ref 260–445)
TOTAL PROTEIN: 7.5 G/DL (ref 6.4–8.2)
WBC # BLD: 3.8 K/UL (ref 4–11)

## 2021-11-11 PROCEDURE — 80074 ACUTE HEPATITIS PANEL: CPT

## 2021-11-11 PROCEDURE — 83516 IMMUNOASSAY NONANTIBODY: CPT

## 2021-11-11 PROCEDURE — 85610 PROTHROMBIN TIME: CPT

## 2021-11-11 PROCEDURE — 82728 ASSAY OF FERRITIN: CPT

## 2021-11-11 PROCEDURE — 85025 COMPLETE CBC W/AUTO DIFF WBC: CPT

## 2021-11-11 PROCEDURE — 82104 ALPHA-1-ANTITRYPSIN PHENO: CPT

## 2021-11-11 PROCEDURE — 86038 ANTINUCLEAR ANTIBODIES: CPT

## 2021-11-11 PROCEDURE — 83550 IRON BINDING TEST: CPT

## 2021-11-11 PROCEDURE — 82103 ALPHA-1-ANTITRYPSIN TOTAL: CPT

## 2021-11-11 PROCEDURE — 82105 ALPHA-FETOPROTEIN SERUM: CPT

## 2021-11-11 PROCEDURE — 83690 ASSAY OF LIPASE: CPT

## 2021-11-11 PROCEDURE — 83540 ASSAY OF IRON: CPT

## 2021-11-11 PROCEDURE — 80053 COMPREHEN METABOLIC PANEL: CPT

## 2021-11-12 LAB — ANTI-NUCLEAR ANTIBODY (ANA): NEGATIVE

## 2021-11-15 LAB — AFP: 2.6 UG/L

## 2021-11-16 LAB
ALPHA-1 ANTITRYPSIN PHENOTYPE: NORMAL
ALPHA-1 ANTITRYPSIN: 139 MG/DL (ref 90–200)
F-ACTIN AB IGG: 40 UNITS (ref 0–19)
SMOOTH MUSCLE AB IGG TITER: ABNORMAL

## 2021-11-17 LAB — MITOCHONDRIAL M2 AB, IGG: 1.8 U/ML (ref 0–4)

## 2021-12-01 ENCOUNTER — HOSPITAL ENCOUNTER (OUTPATIENT)
Age: 71
Setting detail: OUTPATIENT SURGERY
Discharge: HOME | End: 2021-12-01
Attending: INTERNAL MEDICINE | Admitting: INTERNAL MEDICINE
Payer: MEDICARE

## 2021-12-01 ENCOUNTER — ANESTHESIA (OUTPATIENT)
Dept: ENDOSCOPY | Age: 71
End: 2021-12-01
Payer: MEDICARE

## 2021-12-01 ENCOUNTER — ANESTHESIA EVENT (OUTPATIENT)
Dept: ENDOSCOPY | Age: 71
End: 2021-12-01
Payer: MEDICARE

## 2021-12-01 VITALS
DIASTOLIC BLOOD PRESSURE: 66 MMHG | RESPIRATION RATE: 19 BRPM | OXYGEN SATURATION: 96 % | SYSTOLIC BLOOD PRESSURE: 118 MMHG

## 2021-12-01 VITALS
RESPIRATION RATE: 14 BRPM | HEART RATE: 80 BPM | DIASTOLIC BLOOD PRESSURE: 77 MMHG | OXYGEN SATURATION: 92 % | TEMPERATURE: 98.2 F | SYSTOLIC BLOOD PRESSURE: 142 MMHG

## 2021-12-01 PROCEDURE — 2500000003 HC RX 250 WO HCPCS

## 2021-12-01 PROCEDURE — 2709999900 HC NON-CHARGEABLE SUPPLY: Performed by: INTERNAL MEDICINE

## 2021-12-01 PROCEDURE — 3700000001 HC ADD 15 MINUTES (ANESTHESIA): Performed by: INTERNAL MEDICINE

## 2021-12-01 PROCEDURE — 6360000002 HC RX W HCPCS

## 2021-12-01 PROCEDURE — 88305 TISSUE EXAM BY PATHOLOGIST: CPT

## 2021-12-01 PROCEDURE — 2580000003 HC RX 258: Performed by: INTERNAL MEDICINE

## 2021-12-01 PROCEDURE — 7100000011 HC PHASE II RECOVERY - ADDTL 15 MIN: Performed by: INTERNAL MEDICINE

## 2021-12-01 PROCEDURE — 3609017100 HC EGD: Performed by: INTERNAL MEDICINE

## 2021-12-01 PROCEDURE — 7100000010 HC PHASE II RECOVERY - FIRST 15 MIN: Performed by: INTERNAL MEDICINE

## 2021-12-01 PROCEDURE — 3700000000 HC ANESTHESIA ATTENDED CARE: Performed by: INTERNAL MEDICINE

## 2021-12-01 PROCEDURE — 3609010300 HC COLONOSCOPY W/BIOPSY SINGLE/MULTIPLE: Performed by: INTERNAL MEDICINE

## 2021-12-01 RX ORDER — OXYCODONE HYDROCHLORIDE AND ACETAMINOPHEN 5; 325 MG/1; MG/1
1 TABLET ORAL PRN
Status: DISCONTINUED | OUTPATIENT
Start: 2021-12-01 | End: 2021-12-01 | Stop reason: HOSPADM

## 2021-12-01 RX ORDER — ASPIRIN 81 MG/1
81 TABLET ORAL DAILY
COMMUNITY
End: 2022-04-20

## 2021-12-01 RX ORDER — FENTANYL CITRATE 50 UG/ML
25 INJECTION, SOLUTION INTRAMUSCULAR; INTRAVENOUS EVERY 5 MIN PRN
Status: DISCONTINUED | OUTPATIENT
Start: 2021-12-01 | End: 2021-12-01 | Stop reason: HOSPADM

## 2021-12-01 RX ORDER — SODIUM CHLORIDE, SODIUM LACTATE, POTASSIUM CHLORIDE, CALCIUM CHLORIDE 600; 310; 30; 20 MG/100ML; MG/100ML; MG/100ML; MG/100ML
INJECTION, SOLUTION INTRAVENOUS CONTINUOUS
Status: DISCONTINUED | OUTPATIENT
Start: 2021-12-01 | End: 2021-12-01 | Stop reason: HOSPADM

## 2021-12-01 RX ORDER — OXYCODONE HYDROCHLORIDE AND ACETAMINOPHEN 5; 325 MG/1; MG/1
2 TABLET ORAL PRN
Status: DISCONTINUED | OUTPATIENT
Start: 2021-12-01 | End: 2021-12-01 | Stop reason: HOSPADM

## 2021-12-01 RX ORDER — PROPOFOL 10 MG/ML
INJECTION, EMULSION INTRAVENOUS PRN
Status: DISCONTINUED | OUTPATIENT
Start: 2021-12-01 | End: 2021-12-01 | Stop reason: SDUPTHER

## 2021-12-01 RX ORDER — MEPERIDINE HYDROCHLORIDE 25 MG/ML
12.5 INJECTION INTRAMUSCULAR; INTRAVENOUS; SUBCUTANEOUS EVERY 5 MIN PRN
Status: DISCONTINUED | OUTPATIENT
Start: 2021-12-01 | End: 2021-12-01 | Stop reason: HOSPADM

## 2021-12-01 RX ORDER — ONDANSETRON 2 MG/ML
4 INJECTION INTRAMUSCULAR; INTRAVENOUS
Status: DISCONTINUED | OUTPATIENT
Start: 2021-12-01 | End: 2021-12-01 | Stop reason: HOSPADM

## 2021-12-01 RX ORDER — PROMETHAZINE HYDROCHLORIDE 25 MG/ML
6.25 INJECTION, SOLUTION INTRAMUSCULAR; INTRAVENOUS
Status: DISCONTINUED | OUTPATIENT
Start: 2021-12-01 | End: 2021-12-01 | Stop reason: HOSPADM

## 2021-12-01 RX ORDER — LIDOCAINE HYDROCHLORIDE 20 MG/ML
INJECTION, SOLUTION EPIDURAL; INFILTRATION; INTRACAUDAL; PERINEURAL PRN
Status: DISCONTINUED | OUTPATIENT
Start: 2021-12-01 | End: 2021-12-01 | Stop reason: SDUPTHER

## 2021-12-01 RX ORDER — HYDRALAZINE HYDROCHLORIDE 20 MG/ML
5 INJECTION INTRAMUSCULAR; INTRAVENOUS EVERY 10 MIN PRN
Status: DISCONTINUED | OUTPATIENT
Start: 2021-12-01 | End: 2021-12-01 | Stop reason: HOSPADM

## 2021-12-01 RX ADMIN — PROPOFOL 40 MG: 10 INJECTION, EMULSION INTRAVENOUS at 12:01

## 2021-12-01 RX ADMIN — PROPOFOL 70 MG: 10 INJECTION, EMULSION INTRAVENOUS at 11:46

## 2021-12-01 RX ADMIN — PROPOFOL 50 MG: 10 INJECTION, EMULSION INTRAVENOUS at 11:50

## 2021-12-01 RX ADMIN — SODIUM CHLORIDE, POTASSIUM CHLORIDE, SODIUM LACTATE AND CALCIUM CHLORIDE: 600; 310; 30; 20 INJECTION, SOLUTION INTRAVENOUS at 11:11

## 2021-12-01 RX ADMIN — LIDOCAINE HYDROCHLORIDE 60 MG: 20 INJECTION, SOLUTION EPIDURAL; INFILTRATION; INTRACAUDAL; PERINEURAL at 11:46

## 2021-12-01 RX ADMIN — PROPOFOL 50 MG: 10 INJECTION, EMULSION INTRAVENOUS at 11:54

## 2021-12-01 RX ADMIN — PROPOFOL 40 MG: 10 INJECTION, EMULSION INTRAVENOUS at 11:56

## 2021-12-01 ASSESSMENT — PAIN - FUNCTIONAL ASSESSMENT: PAIN_FUNCTIONAL_ASSESSMENT: 0-10

## 2021-12-01 NOTE — ANESTHESIA POSTPROCEDURE EVALUATION
Department of Anesthesiology  Postprocedure Note    Patient: Fletcher Mesa  MRN: [id number]  YOB: 1950  Date of evaluation: 12/1/2021    Procedure Summary     Date: 12/01/21 Room / Location: [address] [place] [address]'[facility]    Anesthesia Start: 1143 Anesthesia Stop: 6896    Procedures:       EGD W/ANES. (10:45) (N/A )      COLONOSCOPY WITH BIOPSY (N/A ) Diagnosis: (HX COLON POLYPS, CIRRHOSIS)    Surgeons: Latonia Latham MD Responsible Provider: Ciaran Blackman MD    Anesthesia Type: MAC, TIVA ASA Status: 3          Anesthesia Type: MAC, TIVA    Nikita Phase I: Nikita Score: 10    Nikita Phase II: Nikita Score: 10    Last vitals: Reviewed and per EMR flowsheets.        Anesthesia Post Evaluation   Anesthetic Problems: no   Cardiovascular System Stable: yes  Respiratory Function: Airway Patent yes  ETT no  Ventilator no  Level of consciousness: awake, alert and oriented  Post-op pain: adequate analgesia  Hydration Adequate: yes  Nausea/Vomiting:no  Other Issues:     Elkin Huerta MD

## 2021-12-01 NOTE — PROGRESS NOTES
Bedside report given to Autumn CIFUENTES, pt returned from bathroom did have a small amount of blood bloody liquid stool. Josselyn Lozano, RN

## 2021-12-01 NOTE — H&P
History and Physical / Pre-Sedation Assessment    Patient:  Jair Haddad   :   1950     Intended Procedure:  EGD    HPI: 70-year-old male with history of hypertension, hyperlipidemia, coronary artery disease status post CABG, COPD, congestive heart failure, alcohol abuse and cirrhosis presents for surveillance of esophageal varices and colon polyps    Past Medical History:   Diagnosis Date    Acid reflux     Acute alcohol intoxication ([facility] [address] 2017    Acute respiratory failure with hypoxemia ([facility] [address] 2017    Alcohol withdrawal ([facility] [address] 2009    Anxiety     CAD (coronary artery disease)     COPD (chronic obstructive pulmonary disease) (HCC)     Hyperlipidemia     Hypertension     LINSEY (obstructive sleep apnea)     PTSD (post-traumatic stress disorder)      Past Surgical History:   Procedure Laterality Date    CARDIAC SURGERY      cabg    CATARACT REMOVAL WITH IMPLANT Left 2016    PHACO EMULSIFICATION OF CATARACT WITH INTRAOCULAR LENS    CATARACT REMOVAL WITH IMPLANT Right 2016    COLONOSCOPY      normal    COLONOSCOPY  12/15/2016    EYE SURGERY      cataract bilateral    HERNIA REPAIR      umbilical    KNEE ARTHROSCOPY         Medications reviewed  Prior to Admission medications    Medication Sig Start Date End Date Taking?  Authorizing Provider   aspirin 81 MG EC tablet Take 81 mg by mouth daily   Yes Historical Provider, MD   torsemide (DEMADEX) 20 MG tablet Take 0.5 tablets by mouth daily 10/12/21   Mirta Carey MD   fluticasone [facility]) 50 MCG/ACT nasal spray 2 sprays by Nasal route daily 10/12/21   Mirta Carey MD   rosuvastatin (CRESTOR) 10 MG tablet Take 1 tablet by mouth once daily 21   Michael Earl, APRN - CNP   albuterol sulfate  (90 Base) MCG/ACT inhaler INHALE 2 PUFFS BY MOUTH EVERY 6 HOURS AS NEEDED FOR WHEEZING 21   Mirta Carey MD   dapagliflozin (FARXIGA) 5 MG tablet Take 5 mg by mouth every morning    Historical Provider, MD   fluocinonide (LIDEX) 0.05 % ointment Apply sparingly to affected area(s) bid prn for flares. Do not apply on cleared skin. 6/11/21   Jarod Duckworth MD   ferrous sulfate (IRON 325) 325 (65 Fe) MG tablet Take 1 tab with breakfast  6/10/21   Jose Tidwell MD   busPIRone (BUSPAR) 10 MG tablet Take 1 tablet by mouth twice daily 4/12/21   Jose Tidwell MD   traZODone (DESYREL) 100 MG tablet Take 200 mg by mouth nightly    Historical Provider, MD   metoprolol (LOPRESSOR) 100 MG tablet Take 1 tablet by mouth twice daily  Patient taking differently: Take 50 mg by mouth 2 times daily Take 1 tablet by mouth twice daily 10/6/20   Jose Tidwell MD   allopurinol (ZYLOPRIM) 300 MG tablet TAKE 1 TABLET BY MOUTH ONCE DAILY 10/6/20   Jose Tidwell MD   budesonide-formoterol (SYMBICORT) 160-4.5 MCG/ACT AERO INHALE 1 PUFF BY MOUTH TWICE DAILY 10/6/20   Jose Tidwell MD   ipratropium-albuterol (DUONEB) 0.5-2.5 (3) MG/3ML SOLN nebulizer solution Inhale 3 mLs into the lungs every 4 hours 3/16/20   Jarrod Harp MD   Cholecalciferol (VITAMIN D3) 50 MCG (2000 UT) CAPS Take 1 capsule by mouth daily    Historical Provider, MD   Coenzyme Q10 (COQ10) 100 MG CAPS Take 1 capsule by mouth daily    Historical Provider, MD   MYRBETRIQ 50 MG TB24 Take 50 mg by mouth daily  7/31/19   Historical Provider, MD   omeprazole 20 MG EC tablet Take 20 mg by mouth 2 times daily     Historical Provider, MD   vitamin B-12 (CYANOCOBALAMIN) 1000 MCG tablet Take 1 tablet by mouth daily 10/31/17   Jarrod Harp MD        Allergies: Allergies   Allergen Reactions    Lisinopril Other (See Comments)     angioedema is what he had. See 12/ 2017 ER visit and [facility]    Penicillins Hives    Sulfa Antibiotics Hives    Tetanus Toxoids Hives       Nurses notes reviewed and agreed.     Physical Exam:  Vital Signs: BP (!) 140/84   Pulse 81   Temp 98.5 °F (36.9 °C) (Temporal)   Resp 16   SpO2 96%    Airway: Mallampati: II (soft palate, uvula, fauces visible)  Pulmonary:Normal  Cardiac:Normal  Abdomen:Normal    Pre-Procedure Assessment / Plan:  ASA: Class 3 - A patient with severe systemic disease that limits activity but is not incapacitating  Level of Sedation Plan: Moderate sedation  Post Procedure plan: Return to same level of care    I assessed the patient and find that the patient is in satisfactory condition to proceed with the planned procedure and sedation plan. I have explained the risk, benefits, and alternatives to the procedure; the patient understands and agrees to proceed.        Gaetano Shen MD  12/1/2021

## 2021-12-01 NOTE — BRIEF OP NOTE
Brief Postoperative Note      Patient: Avinash Kramer  YOB: 1950  MRN: [id number]    Date of Procedure: 12/1/2021    Pre-Op Diagnosis: HX COLON POLYPS, CIRRHOSIS    Post-Op Diagnosis: Grade I EV, portal HTN gastropathy, possible GAVE, rectal polyp, internal hemorrhoids, rectal varices, fair to poor prep. Procedure(s):  EGD W/ANES. (10:45)  COLONOSCOPY WITH BIOPSY    Surgeon(s):  Michell Collier MD    Assistant:  * No surgical staff found *    Anesthesia: Monitor Anesthesia Care    Estimated Blood Loss (mL): Minimal    Complications: None    Specimens:   ID Type Source Tests Collected by Time Destination   A :  Tissue Biopsy SURGICAL PATHOLOGY Michell Collier MD 12/1/2021 1205        Implants:  * No implants in log *      Drains: * No LDAs found *    Findings: Grade I EV, portal HTN gastropathy, possible GAVE, rectal polyp, internal hemorrhoids, rectal varices, fair to poor prep.     Electronically signed by Michell Collier MD on 12/1/2021 at 12:14 PM

## 2021-12-01 NOTE — PROGRESS NOTES
3434 Verbal and written discharge instructions given to wife. Verbalized understanding.   Patient getting dressed

## 2021-12-01 NOTE — OP NOTE
[address]                 [address] [address]                                OPERATIVE REPORT    PATIENT NAME: Marvin Haynes                   :        1950  MED REC NO:   [id number]                          ROOM:  ACCOUNT NO:   [de-identified]                           ADMIT DATE: 2021  PROVIDER:     Dmitriy Lucas MD    DATE OF PROCEDURE:  2021    PREPROCEDURE DIAGNOSES:  1. Cirrhosis. 2.  Variceal screening. 3.  History of colon polyps. 4.  Surveillance colonoscopy. POSTPROCEDURE DIAGNOSES:  1. Five columns of grade 1 esophageal varices. 2.  Moderate-to-severe portal hypertensive gastropathy. 3.  Possible GAVE in the antrum. 4.  Single rectal polyp. 5.  Poor prep. 6. Internal hemorrhoids and rectal varices. PROCEDURES:  1.  EGD. 2.  Colonoscopy to the cecum with snare polypectomy. SURGEON:  Dmitriy Lucas MD    MEDICATIONS:  MAC per Anesthesia. PROCEDURE INDICATIONS:  A 77-year-old male with history of hypertension,  hyperlipidemia, coronary artery disease status post CABG, COPD,  congestive heart failure, alcohol abuse, and cirrhosis complicated by  grade 1 esophageal varices, presents for surveillance of esophageal  varices. He also has history of colon polyps and his last colonoscopy  was in 2016. PROCEDURE DETAILS:  Informed consent was obtained after discussing  risks, benefits, and alternatives. Full history and physical was  performed. The patient was classified as ASA class III. Medications  were given by Anesthesia. The cardiopulmonary status was continuously  monitored throughout the procedure. The patient was placed in left  lateral decubitus position. Once the patient was adequately sedated, a  standard upper gastroscope was inserted in the mouth and advanced under  direct visualization to the second portion of the duodenum.   The entire  mucosa of esophagus, stomach and duodenum examined carefully. The  patient tolerated well without any difficulties. While the patient is lying in the left lateral decubitus position, the  bed was repositioned, a standard pediatric colonoscope was inserted in  the anus and advanced to the cecum, identified by the landmarks of  appendiceal orifice and IC valve. The entire mucosa of cecum, ascending  colon, transverse colon, descending colon, sigmoid colon, rectum were  examined carefully during withdrawal.  The colon prep was fair to poor. Aggressive lavage was performed particularly in the right colon. Retroflexed view of the rectum was also performed. FINDINGS:    ESOPHAGUS:  There were five columns of grade 1 esophageal varices. No  active bleeding or high-risk stigmata. The Z-line was irregular but no  signs of Smith's or inflammation or ulcers. STOMACH:  There was evidence of moderate portal hypertensive gastropathy  in the proximal stomach. There was also petechiae in the antrum,  raising concern for GAVE but no active bleeding. DUODENUM:  Examined portion of the duodenum appeared normal.    RECTUM:  The digital rectal exam was normal.  No masses were felt. COLON:  There was thick adherent stool, particularly in the right colon. Aggressive lavage was performed, but small polyps could have been easily  missed on this exam.  There was a single sessile 5 mm polyp in the  rectum. This was completely resected using snare polypectomy method. Remaining examined colon mucosa appeared normal and healthy without any  evidence of inflammation, ulcers, pseudomembranes, or masses. There was  also evidence of rectal varices internal hemorrhoids on the retroflexed  view. SUMMARY:  1. Five columns of grade 1 esophageal varices. 2.  Moderate-to-severe portal hypertensive gastropathy. 3.  Possible GAVE. 4.  Rectal polyp. 5.  Normal colon except for limited by fair to poor prep.   6.  Rectal varices and internal hemorrhoids. RECOMMENDATIONS:  1. Discharge the patient to home when standard parameters are met. 2.  Continue nonselective beta-blocker such as nadolol. 3.  Repeat EGD in 3 years for surveillance. 4.  Continue PPI daily. 5.  Repeat colonoscopy in 3 years given the fair to poor prep. 6.  Encourage high-fiber diet.     EBL: <5mL    Alex Jarvis MD    D: 12/01/2021 12:13:57       T: 12/01/2021 12:17:55     [email or web address]  Job#: [id number]     Doc#: [id number]    CC:  Lorraine Schmitz MD

## 2021-12-01 NOTE — ANESTHESIA PRE PROCEDURE
Department of Anesthesiology  Preprocedure Note       Name:  Saadia Galeano   Age:  70 y.o.  :  1950                                          MRN:  [id number]         Date:  2021      Surgeon: Denae Del Cid):  Summer Bernstein MD    Procedure: Procedure(s):  EGD W/ANES. (10:45)  COLON W/ANES. Medications prior to admission:   aspirin 81 MG EC tablet Take 81 mg by mouth daily   torsemide (DEMADEX) 20 MG tablet Take 0.5 tablets by mouth daily   fluticasone (FLONASE) 50 MCG/ACT nasal spray 2 sprays by Nasal route daily   rosuvastatin (CRESTOR) 10 MG tablet Take 1 tablet by mouth once daily   albuterol sulfate  (90 Base) MCG/ACT inhaler INHALE 2 PUFFS BY MOUTH EVERY 6 HOURS AS NEEDED FOR WHEEZING   dapagliflozin (FARXIGA) 5 MG tablet Take 5 mg by mouth every morning   fluocinonide (LIDEX) 0.05 % ointment Apply sparingly to affected area(s) bid prn for flares. Do not apply on cleared skin.    ferrous sulfate (IRON 325) 325 (65 Fe) MG tablet Take 1 tab with breakfast    busPIRone (BUSPAR) 10 MG tablet Take 1 tablet by mouth twice daily   traZODone (DESYREL) 100 MG tablet Take 200 mg by mouth nightly   metoprolol (LOPRESSOR) 100 MG tablet Take 1 tablet by mouth twice daily  Patient taking differently: Take 50 mg by mouth 2 times daily Take 1 tablet by mouth twice daily   allopurinol (ZYLOPRIM) 300 MG tablet TAKE 1 TABLET BY MOUTH ONCE DAILY   budesonide-formoterol (SYMBICORT) 160-4.5 MCG/ACT AERO INHALE 1 PUFF BY MOUTH TWICE DAILY   ipratropium-albuterol (DUONEB) 0.5-2.5 (3) MG/3ML SOLN nebulizer solution Inhale 3 mLs into the lungs every 4 hours   Cholecalciferol (VITAMIN D3) 50 MCG (2000 UT) CAPS Take 1 capsule by mouth daily   Coenzyme Q10 (COQ10) 100 MG CAPS Take 1 capsule by mouth daily   MYRBETRIQ 50 MG TB24 Take 50 mg by mouth daily    omeprazole 20 MG EC tablet Take 20 mg by mouth 2 times daily    vitamin B-12 (CYANOCOBALAMIN) 1000 MCG tablet Take 1 tablet by mouth daily     Allergies:  Lisinopril Other (See Comments)     angioedema is what he had. See 2017 ER visit and [facility] hospitalization    Penicillins Hives    Sulfa Antibiotics Hives    Tetanus Toxoids Hives     Problem List:     Memory impairment    Coronary artery disease involving native heart without angina pectoris    Hypertension    Paroxysmal atrial fibrillation (HCC)    LINSEY (non-compliant with home CPAP/BiPAP)    Chronic gout without tophus    COPD (chronic obstructive pulmonary disease) (HCC)    GERD (gastroesophageal reflux disease)    HLD (hyperlipidemia)    Hx bowel perforation    Former smoker    Hx ventral hernia    Anticoagulated on Eliquis    Alcoholism ([address]    Hypomagnesemia    Lactic acidosis    Obesity (BMI 30-39. 9)    Acute diastolic congestive heart failure (HCC)    Chronic diastolic congestive heart failure (HCC)     Past Medical History:     Acid reflux     Acute alcohol intoxication ([address] 2017    Acute respiratory failure with hypoxemia (HCC) 2017    Alcohol withdrawal ([address] 2009    Anxiety     CAD (coronary artery disease)     COPD (chronic obstructive pulmonary disease) (HCC)     Hyperlipidemia     Hypertension     LINSEY (obstructive sleep apnea)     PTSD (post-traumatic stress disorder)      Past Surgical History:     CARDIAC SURGERY      cabg    CATARACT REMOVAL WITH IMPLANT Left 2016    PHACO EMULSIFICATION OF CATARACT WITH INTRAOCULAR LENS    CATARACT REMOVAL WITH IMPLANT Right 2016    COLONOSCOPY      normal    COLONOSCOPY  12/15/2016    EYE SURGERY      cataract bilateral    HERNIA REPAIR      umbilical    KNEE ARTHROSCOPY       Social History:     Smoking status: Former Smoker     Packs/day: 2.00     Years: 40.00     Pack years: 80.00     Types: Cigarettes     Quit date: 2002     Years since quittin.9    Smokeless tobacco: Current User     Types: Snuff    Tobacco comment: 2016 placed in avs no interval change, dysrhythmias: atrial flutter, hyperlipidemia    (-)  angina and murmur      Rhythm: regular  Rate: normal                    Neuro/Psych:   (+) neuromuscular disease:, psychiatric history:   (-) seizures, TIA and CVA            ROS comment: Chronic tremors   GI/Hepatic/Renal:   (+) GERD: well controlled, hepatitis:,           Endo/Other:    (+) : arthritis:., .    (-) diabetes mellitus, hypothyroidism               Abdominal:   (+) obese,           Vascular:   + DVT, .  - PE.       ROS comment: Remote DVT. Other Findings:             Anesthesia Plan      MAC and TIVA     ASA 3       Induction: intravenous. MIPS: Prophylactic antiemetics administered. Anesthetic plan and risks discussed with patient. Plan discussed with CRNA.                   Daina Wang MD

## 2021-12-01 NOTE — PROGRESS NOTES
Bedside report received from [address] [place] [address] and CRNA, pt awake resting in bed no distress noted. Park Atkinson RN

## 2021-12-02 DIAGNOSIS — M1A.9XX0 CHRONIC GOUT WITHOUT TOPHUS, UNSPECIFIED CAUSE, UNSPECIFIED SITE: ICD-10-CM

## 2021-12-02 RX ORDER — ALLOPURINOL 300 MG/1
TABLET ORAL
Qty: 90 TABLET | Refills: 3 | Status: SHIPPED | OUTPATIENT
Start: 2021-12-02 | End: 2021-12-03

## 2021-12-03 RX ORDER — ALLOPURINOL 300 MG/1
TABLET ORAL
Qty: 90 TABLET | Refills: 1 | Status: SHIPPED | OUTPATIENT
Start: 2021-12-03 | End: 2022-06-06

## 2022-01-13 ENCOUNTER — PRE-PROCEDURE TELEPHONE (OUTPATIENT)
Dept: INTERVENTIONAL RADIOLOGY/VASCULAR | Age: 72
End: 2022-01-13

## 2022-01-13 RX ORDER — BUSPIRONE HYDROCHLORIDE 10 MG/1
TABLET ORAL
Qty: 180 TABLET | Refills: 3 | Status: ON HOLD
Start: 2022-01-13 | End: 2022-06-29 | Stop reason: HOSPADM

## 2022-01-13 NOTE — PROGRESS NOTES
First attempt to contact the patient at the only number listed in chart regarding procedure preparations for random liver biopsy on 01/17/2022 at 1000. No answer, voicemail states you have reached \"[place]\" and voice mail box is full. Will attempt to contact patient again at a later time and date.

## 2022-01-14 ENCOUNTER — PRE-PROCEDURE TELEPHONE (OUTPATIENT)
Dept: INTERVENTIONAL RADIOLOGY/VASCULAR | Age: 72
End: 2022-01-14

## 2022-01-14 NOTE — PROGRESS NOTES
Pre procedure phone call complete for image guided random liver biopsy. Spoke with patients with Ashanti GARDINER). Offered wife and  the 0830 time slot. Wife and  accepted. Bill Guerrero RN notified and agreed. Wife aware to be at [facility] entrance @ 0630,  NPO after MN with exception of blood pressure and anxiety medication. Will need a . Patient instructed to take BP medications with a sip of water. Recovery estimate 2-3 hrs. Wife and  verbalized understanding of the pre op instructions and stated no more questions. Patients Aspirin has been on hold. Labs ordered.

## 2022-01-17 ENCOUNTER — HOSPITAL ENCOUNTER (OUTPATIENT)
Dept: CT IMAGING | Age: 72
Discharge: HOME | End: 2022-01-17
Payer: MEDICARE

## 2022-01-17 VITALS
RESPIRATION RATE: 14 BRPM | BODY MASS INDEX: 28.7 KG/M2 | WEIGHT: 205 LBS | TEMPERATURE: 98.2 F | DIASTOLIC BLOOD PRESSURE: 72 MMHG | HEIGHT: 71 IN | SYSTOLIC BLOOD PRESSURE: 125 MMHG | HEART RATE: 72 BPM | OXYGEN SATURATION: 98 %

## 2022-01-17 DIAGNOSIS — K75.4 AUTOIMMUNE HEPATITIS (HCC): ICD-10-CM

## 2022-01-17 DIAGNOSIS — K70.31 ASCITES DUE TO ALCOHOLIC CIRRHOSIS (HCC): ICD-10-CM

## 2022-01-17 LAB
BASOPHILS ABSOLUTE: 0.1 K/UL (ref 0–0.2)
BASOPHILS RELATIVE PERCENT: 1.2 %
EOSINOPHILS ABSOLUTE: 0.3 K/UL (ref 0–0.6)
EOSINOPHILS RELATIVE PERCENT: 4.9 %
HCT VFR BLD CALC: 42.2 % (ref 40.5–52.5)
HEMOGLOBIN: 14.1 G/DL (ref 13.5–17.5)
INR BLD: 1.14 (ref 0.88–1.12)
LYMPHOCYTES ABSOLUTE: 0.8 K/UL (ref 1–5.1)
LYMPHOCYTES RELATIVE PERCENT: 14.6 %
MCH RBC QN AUTO: 31.7 PG (ref 26–34)
MCHC RBC AUTO-ENTMCNC: 33.4 G/DL (ref 31–36)
MCV RBC AUTO: 94.6 FL (ref 80–100)
MONOCYTES ABSOLUTE: 1 K/UL (ref 0–1.3)
MONOCYTES RELATIVE PERCENT: 18.4 %
NEUTROPHILS ABSOLUTE: 3.4 K/UL (ref 1.7–7.7)
NEUTROPHILS RELATIVE PERCENT: 60.9 %
PDW BLD-RTO: 16.6 % (ref 12.4–15.4)
PLATELET # BLD: 117 K/UL (ref 135–450)
PMV BLD AUTO: 8 FL (ref 5–10.5)
PROTHROMBIN TIME: 13 SEC (ref 9.9–12.7)
RBC # BLD: 4.46 M/UL (ref 4.2–5.9)
WBC # BLD: 5.6 K/UL (ref 4–11)

## 2022-01-17 PROCEDURE — 88313 SPECIAL STAINS GROUP 2: CPT

## 2022-01-17 PROCEDURE — 7100000011 HC PHASE II RECOVERY - ADDTL 15 MIN

## 2022-01-17 PROCEDURE — 88307 TISSUE EXAM BY PATHOLOGIST: CPT

## 2022-01-17 PROCEDURE — 77012 CT SCAN FOR NEEDLE BIOPSY: CPT

## 2022-01-17 PROCEDURE — 85025 COMPLETE CBC W/AUTO DIFF WBC: CPT

## 2022-01-17 PROCEDURE — 6360000002 HC RX W HCPCS: Performed by: RADIOLOGY

## 2022-01-17 PROCEDURE — 36415 COLL VENOUS BLD VENIPUNCTURE: CPT

## 2022-01-17 PROCEDURE — 85610 PROTHROMBIN TIME: CPT

## 2022-01-17 PROCEDURE — 2709999900 CT NEEDLE BIOPSY LIVER PERCUTANEOUS

## 2022-01-17 PROCEDURE — 7100000010 HC PHASE II RECOVERY - FIRST 15 MIN

## 2022-01-17 RX ORDER — FENTANYL CITRATE 50 UG/ML
INJECTION, SOLUTION INTRAMUSCULAR; INTRAVENOUS
Status: COMPLETED | OUTPATIENT
Start: 2022-01-17 | End: 2022-01-17

## 2022-01-17 RX ORDER — MIDAZOLAM HYDROCHLORIDE 5 MG/ML
INJECTION INTRAMUSCULAR; INTRAVENOUS
Status: COMPLETED | OUTPATIENT
Start: 2022-01-17 | End: 2022-01-17

## 2022-01-17 RX ADMIN — FENTANYL CITRATE 100 MCG: 50 INJECTION INTRAMUSCULAR; INTRAVENOUS at 10:18

## 2022-01-17 RX ADMIN — MIDAZOLAM HYDROCHLORIDE 2 MG: 5 INJECTION, SOLUTION INTRAMUSCULAR; INTRAVENOUS at 10:18

## 2022-01-17 ASSESSMENT — PAIN SCALES - GENERAL
PAINLEVEL_OUTOF10: 0

## 2022-01-17 ASSESSMENT — PAIN - FUNCTIONAL ASSESSMENT
PAIN_FUNCTIONAL_ASSESSMENT: 0-10
PAIN_FUNCTIONAL_ASSESSMENT: 0-10

## 2022-03-03 ENCOUNTER — TELEPHONE (OUTPATIENT)
Dept: FAMILY MEDICINE CLINIC | Age: 72
End: 2022-03-03

## 2022-03-03 DIAGNOSIS — L98.9 DERMATOLOGIC PROBLEM: Primary | ICD-10-CM

## 2022-03-03 NOTE — TELEPHONE ENCOUNTER
----- Message from [facility] sent at 3/3/2022 11:51 AM EST -----  Subject: Referral Request    QUESTIONS   Reason for referral request? pt would like a referral to a new   dermatologist, due to his leaving the practice   Has the physician seen you for this condition before? No   Preferred Specialist (if applicable)? Do you already have an appointment scheduled? No  Additional Information for Provider?   ---------------------------------------------------------------------------  --------------  CALL BACK INFO  What is the best way for the office to contact you? OK to leave message on   voicemail  Preferred Call Back Phone Number?  [phone]

## 2022-04-12 DIAGNOSIS — J44.9 CHRONIC OBSTRUCTIVE PULMONARY DISEASE, UNSPECIFIED COPD TYPE (HCC): ICD-10-CM

## 2022-04-12 RX ORDER — BUDESONIDE AND FORMOTEROL FUMARATE DIHYDRATE 160; 4.5 UG/1; UG/1
AEROSOL RESPIRATORY (INHALATION)
Qty: 3 EACH | Refills: 3 | Status: SHIPPED | OUTPATIENT
Start: 2022-04-12

## 2022-04-20 ENCOUNTER — OFFICE VISIT (OUTPATIENT)
Dept: FAMILY MEDICINE CLINIC | Age: 72
End: 2022-04-20
Payer: MEDICARE

## 2022-04-20 VITALS
HEIGHT: 71 IN | HEART RATE: 76 BPM | SYSTOLIC BLOOD PRESSURE: 104 MMHG | WEIGHT: 207 LBS | DIASTOLIC BLOOD PRESSURE: 62 MMHG | OXYGEN SATURATION: 97 % | BODY MASS INDEX: 28.98 KG/M2

## 2022-04-20 DIAGNOSIS — R05.9 COUGH: ICD-10-CM

## 2022-04-20 DIAGNOSIS — F41.9 ANXIETY: ICD-10-CM

## 2022-04-20 DIAGNOSIS — E78.5 HYPERLIPIDEMIA, UNSPECIFIED HYPERLIPIDEMIA TYPE: ICD-10-CM

## 2022-04-20 DIAGNOSIS — R42 DIZZINESS: ICD-10-CM

## 2022-04-20 DIAGNOSIS — R97.20 RISING PSA LEVEL: ICD-10-CM

## 2022-04-20 DIAGNOSIS — F10.20 ALCOHOLISM (HCC): ICD-10-CM

## 2022-04-20 DIAGNOSIS — I48.0 PAROXYSMAL ATRIAL FIBRILLATION (HCC): ICD-10-CM

## 2022-04-20 DIAGNOSIS — J44.9 CHRONIC OBSTRUCTIVE PULMONARY DISEASE, UNSPECIFIED COPD TYPE (HCC): Primary | ICD-10-CM

## 2022-04-20 DIAGNOSIS — I50.32 CHRONIC DIASTOLIC CONGESTIVE HEART FAILURE (HCC): ICD-10-CM

## 2022-04-20 LAB
A/G RATIO: 1.1 (ref 1.1–2.2)
ALBUMIN SERPL-MCNC: 3.4 G/DL (ref 3.4–5)
ALP BLD-CCNC: 136 U/L (ref 40–129)
ALT SERPL-CCNC: 42 U/L (ref 10–40)
ANION GAP SERPL CALCULATED.3IONS-SCNC: 15 MMOL/L (ref 3–16)
AST SERPL-CCNC: 87 U/L (ref 15–37)
BASOPHILS ABSOLUTE: 0 K/UL (ref 0–0.2)
BASOPHILS RELATIVE PERCENT: 1.3 %
BILIRUB SERPL-MCNC: 2.2 MG/DL (ref 0–1)
BUN BLDV-MCNC: 12 MG/DL (ref 7–20)
CALCIUM SERPL-MCNC: 9.3 MG/DL (ref 8.3–10.6)
CHLORIDE BLD-SCNC: 92 MMOL/L (ref 99–110)
CHOLESTEROL, TOTAL: 172 MG/DL (ref 0–199)
CO2: 29 MMOL/L (ref 21–32)
CREAT SERPL-MCNC: 1 MG/DL (ref 0.8–1.3)
EOSINOPHILS ABSOLUTE: 0.2 K/UL (ref 0–0.6)
EOSINOPHILS RELATIVE PERCENT: 5.5 %
FOLATE: 4.37 NG/ML (ref 4.78–24.2)
GFR AFRICAN AMERICAN: >60
GFR NON-AFRICAN AMERICAN: >60
GLUCOSE BLD-MCNC: 79 MG/DL (ref 70–99)
HCT VFR BLD CALC: 34.1 % (ref 40.5–52.5)
HDLC SERPL-MCNC: 97 MG/DL (ref 40–60)
HEMOGLOBIN: 11.2 G/DL (ref 13.5–17.5)
LDL CHOLESTEROL CALCULATED: 59 MG/DL
LYMPHOCYTES ABSOLUTE: 0.6 K/UL (ref 1–5.1)
LYMPHOCYTES RELATIVE PERCENT: 18.4 %
MCH RBC QN AUTO: 29 PG (ref 26–34)
MCHC RBC AUTO-ENTMCNC: 32.7 G/DL (ref 31–36)
MCV RBC AUTO: 88.5 FL (ref 80–100)
MONOCYTES ABSOLUTE: 0.5 K/UL (ref 0–1.3)
MONOCYTES RELATIVE PERCENT: 17.7 %
NEUTROPHILS ABSOLUTE: 1.8 K/UL (ref 1.7–7.7)
NEUTROPHILS RELATIVE PERCENT: 57.1 %
PDW BLD-RTO: 15.5 % (ref 12.4–15.4)
PLATELET # BLD: 68 K/UL (ref 135–450)
PLATELET SLIDE REVIEW: ABNORMAL
PMV BLD AUTO: 9.2 FL (ref 5–10.5)
POTASSIUM SERPL-SCNC: 3.5 MMOL/L (ref 3.5–5.1)
PROSTATE SPECIFIC ANTIGEN: 0.71 NG/ML (ref 0–4)
RBC # BLD: 3.86 M/UL (ref 4.2–5.9)
SLIDE REVIEW: ABNORMAL
SODIUM BLD-SCNC: 136 MMOL/L (ref 136–145)
T4 FREE: 1.1 NG/DL (ref 0.9–1.8)
TOTAL PROTEIN: 6.4 G/DL (ref 6.4–8.2)
TRIGL SERPL-MCNC: 81 MG/DL (ref 0–150)
TSH SERPL DL<=0.05 MIU/L-ACNC: 4.02 UIU/ML (ref 0.27–4.2)
VITAMIN B-12: 1720 PG/ML (ref 211–911)
VLDLC SERPL CALC-MCNC: 16 MG/DL
WBC # BLD: 3.1 K/UL (ref 4–11)

## 2022-04-20 PROCEDURE — 3017F COLORECTAL CA SCREEN DOC REV: CPT | Performed by: FAMILY MEDICINE

## 2022-04-20 PROCEDURE — 4040F PNEUMOC VAC/ADMIN/RCVD: CPT | Performed by: FAMILY MEDICINE

## 2022-04-20 PROCEDURE — G8427 DOCREV CUR MEDS BY ELIG CLIN: HCPCS | Performed by: FAMILY MEDICINE

## 2022-04-20 PROCEDURE — 4004F PT TOBACCO SCREEN RCVD TLK: CPT | Performed by: FAMILY MEDICINE

## 2022-04-20 PROCEDURE — 36415 COLL VENOUS BLD VENIPUNCTURE: CPT | Performed by: FAMILY MEDICINE

## 2022-04-20 PROCEDURE — G8417 CALC BMI ABV UP PARAM F/U: HCPCS | Performed by: FAMILY MEDICINE

## 2022-04-20 PROCEDURE — 99214 OFFICE O/P EST MOD 30 MIN: CPT | Performed by: FAMILY MEDICINE

## 2022-04-20 PROCEDURE — 3023F SPIROM DOC REV: CPT | Performed by: FAMILY MEDICINE

## 2022-04-20 PROCEDURE — 1123F ACP DISCUSS/DSCN MKR DOCD: CPT | Performed by: FAMILY MEDICINE

## 2022-04-20 RX ORDER — POTASSIUM CHLORIDE 1500 MG/1
20 TABLET, FILM COATED, EXTENDED RELEASE ORAL
COMMUNITY

## 2022-04-20 RX ORDER — NADOLOL 20 MG/1
20 TABLET ORAL DAILY
COMMUNITY

## 2022-04-20 RX ORDER — VENLAFAXINE 37.5 MG/1
37.5 TABLET ORAL
Status: ON HOLD | COMMUNITY
End: 2022-06-29 | Stop reason: HOSPADM

## 2022-04-20 RX ORDER — PANTOPRAZOLE SODIUM 40 MG/1
40 TABLET, DELAYED RELEASE ORAL 2 TIMES DAILY
COMMUNITY

## 2022-04-20 RX ORDER — MIDODRINE HYDROCHLORIDE 5 MG/1
5 TABLET ORAL 2 TIMES DAILY
Status: ON HOLD | COMMUNITY
End: 2022-06-29 | Stop reason: SDUPTHER

## 2022-04-20 SDOH — ECONOMIC STABILITY: FOOD INSECURITY: WITHIN THE PAST 12 MONTHS, YOU WORRIED THAT YOUR FOOD WOULD RUN OUT BEFORE YOU GOT MONEY TO BUY MORE.: NEVER TRUE

## 2022-04-20 SDOH — ECONOMIC STABILITY: FOOD INSECURITY: WITHIN THE PAST 12 MONTHS, THE FOOD YOU BOUGHT JUST DIDN'T LAST AND YOU DIDN'T HAVE MONEY TO GET MORE.: NEVER TRUE

## 2022-04-20 ASSESSMENT — PATIENT HEALTH QUESTIONNAIRE - PHQ9
SUM OF ALL RESPONSES TO PHQ QUESTIONS 1-9: 0
1. LITTLE INTEREST OR PLEASURE IN DOING THINGS: 0
SUM OF ALL RESPONSES TO PHQ9 QUESTIONS 1 & 2: 0
2. FEELING DOWN, DEPRESSED OR HOPELESS: 0
SUM OF ALL RESPONSES TO PHQ QUESTIONS 1-9: 0

## 2022-04-20 ASSESSMENT — SOCIAL DETERMINANTS OF HEALTH (SDOH): HOW HARD IS IT FOR YOU TO PAY FOR THE VERY BASICS LIKE FOOD, HOUSING, MEDICAL CARE, AND HEATING?: NOT HARD AT ALL

## 2022-04-20 ASSESSMENT — ENCOUNTER SYMPTOMS: COUGH: 1

## 2022-04-20 NOTE — PROGRESS NOTES
Chief Complaint   Patient presents with    Insomnia    Cough       HPI:  Ava Anderson is a 70 y.o. (: 1950) here today   for trouble sleeping. Overall sleep patten has improved. Patient is also having issues with coughing and then gaging. Has been having issues w/ occas cough, then has sig gagging/ dry heaving. Intermittent sxs. More often and more severe in the recent past.  Recently lasted all day. Does have sig amt of post nasal drainage. Will even gag w/ brushing teeth on occas. Does have appt in  w/ GI    Had sig dizziness recently. Passed out. [place] Lente. Out few seconds. Occurs suddenly. Did have some cough w/ recent episode, other times more random. Other times worse after bending over. Does not seem to have and assoc heart racing. Does not feel afib. Hx of cardioversion approx 1 yr ago. Has been f/u w/ cardio. Does have episodes of afib. Cardio added midodrine to help regulate bp. Had farxiga added as well. Still sig issues w/ anxiety. Still w/ some etoh use. Less than prior. Still w/ sig etoh consumption. Copd near baseline. Not using albuterol often. HPI    Patient's medications, allergies, past medical, surgical, social and family histories were reviewed and updated as appropriate. ROS:  Review of Systems   Constitutional: Negative for fever. Respiratory: Positive for cough. Neurological: Positive for dizziness, syncope and light-headedness. Prior to Visit Medications    Medication Sig Taking?  Authorizing Provider   pantoprazole (PROTONIX) 40 MG tablet Take 40 mg by mouth daily Yes Historical Provider, MD   apixaban (ELIQUIS) 5 MG TABS tablet Take by mouth 2 times daily Yes Historical Provider, MD   venlafaxine (EFFEXOR) 37.5 MG tablet Take 37.5 mg by mouth Yes Historical Provider, MD   midodrine (PROAMATINE) 5 MG tablet Take 5 mg by mouth in the morning and at bedtime Yes Historical Provider, MD   nadolol (CORGARD) 20 MG tablet Take 20 mg by mouth daily Yes Historical Provider, MD   potassium chloride (KLOR-CON M) 20 MEQ TBCR extended release tablet Take 20 mEq by mouth daily (with breakfast) Yes Historical Provider, MD   budesonide-formoterol (SYMBICORT) 160-4.5 MCG/ACT AERO INHALE 1 PUFF BY MOUTH TWICE DAILY Yes Henrietta Matthews MD   busPIRone (BUSPAR) 10 MG tablet Take 1 tablet by mouth twice daily Yes Henrietta Matthews MD   allopurinol (ZYLOPRIM) 300 MG tablet Take 1 tablet by mouth once daily Yes Henrietta Matthews MD   torsemide (DEMADEX) 20 MG tablet Take 0.5 tablets by mouth daily Yes Henrietta Matthews MD   albuterol sulfate  (90 Base) MCG/ACT inhaler INHALE 2 PUFFS BY MOUTH EVERY 6 HOURS AS NEEDED FOR WHEEZING Yes Henrietta Matthews MD   dapagliflozin (FARXIGA) 5 MG tablet Take 5 mg by mouth every morning Yes Historical Provider, MD   ferrous sulfate (IRON 325) 325 (65 Fe) MG tablet Take 1 tab with breakfast  Yes Henrietta Matthews MD   Cholecalciferol (VITAMIN D3) 50 MCG (2000 UT) CAPS Take 1 capsule by mouth daily Yes Historical Provider, MD   Coenzyme Q10 (COQ10) 100 MG CAPS Take 1 capsule by mouth daily Yes Historical Provider, MD   MYRBETRIQ 50 MG TB24 Take 50 mg by mouth daily  Yes Historical Provider, MD   vitamin B-12 (CYANOCOBALAMIN) 1000 MCG tablet Take 1 tablet by mouth daily Yes Tony Rios MD   fluocinonide (LIDEX) 0.05 % ointment Apply sparingly to affected area(s) bid prn for flares. Do not apply on cleared skin. Jaime Terrell MD   ipratropium-albuterol (DUONEB) 0.5-2.5 (3) MG/3ML SOLN nebulizer solution Inhale 3 mLs into the lungs every 4 hours  Tony Rios MD       Allergies   Allergen Reactions    Lisinopril Other (See Comments)     angioedema is what he had.   See 12/ 2017 ER visit and [facility] hospitalization    Penicillins Hives    Sulfa Antibiotics Hives    Tetanus Toxoids Hives       OBJECTIVE:    /62   Pulse 76   Ht 5' 11\" (1.803 m)   Wt 207 lb (93.9 kg) SpO2 97%   BMI 28.87 kg/m²     BP Readings from Last 2 Encounters:   04/20/22 104/62   01/17/22 125/72       Wt Readings from Last 3 Encounters:   04/20/22 207 lb (93.9 kg)   01/17/22 205 lb (93 kg)   10/12/21 224 lb 3.2 oz (101.7 kg)       Physical Exam  Constitutional:       Appearance: Normal appearance. HENT:      Head: Normocephalic and atraumatic. Eyes:      Extraocular Movements: Extraocular movements intact. Cardiovascular:      Rate and Rhythm: Normal rate and regular rhythm. Pulmonary:      Effort: Pulmonary effort is normal.      Breath sounds: Normal breath sounds. Abdominal:      Palpations: Abdomen is soft. Tenderness: There is no abdominal tenderness. Skin:     General: Skin is warm and dry. Neurological:      General: No focal deficit present. Mental Status: He is alert. Psychiatric:         Mood and Affect: Mood normal.         Behavior: Behavior normal.           ASSESSMENT/PLAN:     1. Chronic obstructive pulmonary disease, unspecified COPD type ([facility]  The current medical regimen is effective;  continue present plan and medications. 2. Chronic diastolic congestive heart failure ([facility]  Rpt labs. F/u w/ cardio as sched. Seems well compensated  - Lipid Panel  - Comprehensive Metabolic Panel    3. Alcoholism ([facility]  Ongoing etoh use. Hx of varices as well. Again discussed risk. Pt to f/u w/ GI as planned. - Vitamin B12  - Folate  - VITAMIN B1    4. Paroxysmal atrial fibrillation (HCC)  Seems nsr today. Suspect contrib to dizziness.    - CBC with Auto Differential  - TSH  - T4, Free    5. Rising PSA level  Rpt labs. - PSA, Prostatic Specific Antigen    6. Hyperlipidemia, unspecified hyperlipidemia type  Off statin. States cardio stopped    7. Cough  Seems to cause gagging episodes. Post nasal drainage likely contrib. Gag reflex prominent. Try add nasal spray (flonase). F/u w/ GI re gagging issues as well. Consider ent referral.      8. Anxiety  Doing fair. Try cutting buspar in half for less overall meds and dec risk of SE    9. Dizziness  Suspect multifactorial.  May be related to orthostasis, low bp, afib, meds, others. Discussed approp hydration. Rpt labs.   F/u w/ cardio as sched

## 2022-04-21 DIAGNOSIS — D72.818 LOW BLOOD MONOCYTE COUNT: Primary | ICD-10-CM

## 2022-04-21 DIAGNOSIS — D69.6 ANEMIA WITH LOW PLATELET COUNT (HCC): ICD-10-CM

## 2022-04-21 NOTE — RESULT ENCOUNTER NOTE
Low white blood cell count, mild anemia, and low platelets. Platelets have been low in the past, but this is a little lower than what they have been previously. Again recommend minimize or eliminate alcohol use. If not previously done, consider referral to hematology given low counts. Thyroid labs and PSA normal.  Plenty of B12. Folic acid is low. Recommend folate supplement. This should be available over-the-counter. Lipids a little higher than last check, but still fairly well controlled. Elevation in liver function test, similar to 5 months ago, likely related to alcohol consumption. Recommend appropriate GI follow-up.   Vitamin B1 pending

## 2022-04-26 LAB — VITAMIN B1, PLASMA: 4 NMOL/L (ref 4–15)

## 2022-04-26 NOTE — RESULT ENCOUNTER NOTE
B1 is low. Recommend B1(thiamine) supplementation.   Recommend minimize or eliminate alcohol consumption as well

## 2022-05-10 ENCOUNTER — TELEPHONE (OUTPATIENT)
Dept: FAMILY MEDICINE CLINIC | Age: 72
End: 2022-05-10

## 2022-05-10 NOTE — RESULT ENCOUNTER NOTE
Bilirubin more elevated at 4.4. Patient does have a gastroenterologist/liver specialist.  Recommend he follow-up very closely soon as possible.

## 2022-05-10 NOTE — TELEPHONE ENCOUNTER
Pt seen Dr Casey Gutierrez today and they called on their way home and was told his Bilirubin was 4.4 and he was to call Dr Iraida Smith right away and see what he should do?

## 2022-06-05 DIAGNOSIS — M1A.9XX0 CHRONIC GOUT WITHOUT TOPHUS, UNSPECIFIED CAUSE, UNSPECIFIED SITE: ICD-10-CM

## 2022-06-06 RX ORDER — ALLOPURINOL 300 MG/1
TABLET ORAL
Qty: 90 TABLET | Refills: 1 | Status: SHIPPED | OUTPATIENT
Start: 2022-06-06

## 2022-06-22 ENCOUNTER — HOSPITAL ENCOUNTER (OUTPATIENT)
Age: 72
Discharge: HOME | DRG: 433 | End: 2022-06-22
Payer: MEDICARE

## 2022-06-22 LAB
A/G RATIO: 0.8 (ref 1.1–2.2)
ALBUMIN SERPL-MCNC: 2.7 G/DL (ref 3.4–5)
ALP BLD-CCNC: 148 U/L (ref 40–129)
ALT SERPL-CCNC: 21 U/L (ref 10–40)
ANION GAP SERPL CALCULATED.3IONS-SCNC: 14 MMOL/L (ref 3–16)
AST SERPL-CCNC: 42 U/L (ref 15–37)
BASOPHILS ABSOLUTE: 0 K/UL (ref 0–0.2)
BASOPHILS RELATIVE PERCENT: 1 %
BILIRUB SERPL-MCNC: 3.3 MG/DL (ref 0–1)
BUN BLDV-MCNC: 16 MG/DL (ref 7–20)
CALCIUM SERPL-MCNC: 8.3 MG/DL (ref 8.3–10.6)
CHLORIDE BLD-SCNC: 90 MMOL/L (ref 99–110)
CO2: 28 MMOL/L (ref 21–32)
CREAT SERPL-MCNC: 1.5 MG/DL (ref 0.8–1.3)
EOSINOPHILS ABSOLUTE: 0.1 K/UL (ref 0–0.6)
EOSINOPHILS RELATIVE PERCENT: 2.7 %
GFR AFRICAN AMERICAN: 56
GFR NON-AFRICAN AMERICAN: 46
GLUCOSE BLD-MCNC: 83 MG/DL (ref 70–99)
HCT VFR BLD CALC: 24.3 % (ref 40.5–52.5)
HEMOGLOBIN: 7.9 G/DL (ref 13.5–17.5)
INR BLD: 2.35 (ref 0.87–1.14)
LIPASE: 78 U/L (ref 13–60)
LYMPHOCYTES ABSOLUTE: 0.8 K/UL (ref 1–5.1)
LYMPHOCYTES RELATIVE PERCENT: 19.1 %
MAGNESIUM: 1.2 MG/DL (ref 1.8–2.4)
MCH RBC QN AUTO: 29.5 PG (ref 26–34)
MCHC RBC AUTO-ENTMCNC: 32.6 G/DL (ref 31–36)
MCV RBC AUTO: 90.4 FL (ref 80–100)
MONOCYTES ABSOLUTE: 0.8 K/UL (ref 0–1.3)
MONOCYTES RELATIVE PERCENT: 18.4 %
NEUTROPHILS ABSOLUTE: 2.5 K/UL (ref 1.7–7.7)
NEUTROPHILS RELATIVE PERCENT: 58.8 %
PDW BLD-RTO: 20.5 % (ref 12.4–15.4)
PLATELET # BLD: 121 K/UL (ref 135–450)
PMV BLD AUTO: 7.9 FL (ref 5–10.5)
POTASSIUM SERPL-SCNC: 3.2 MMOL/L (ref 3.5–5.1)
PROTHROMBIN TIME: 25.6 SEC (ref 11.7–14.5)
RBC # BLD: 2.69 M/UL (ref 4.2–5.9)
SODIUM BLD-SCNC: 132 MMOL/L (ref 136–145)
TOTAL PROTEIN: 6 G/DL (ref 6.4–8.2)
VITAMIN D 25-HYDROXY: 79.7 NG/ML
WBC # BLD: 4.2 K/UL (ref 4–11)

## 2022-06-22 PROCEDURE — 83690 ASSAY OF LIPASE: CPT

## 2022-06-22 PROCEDURE — 80053 COMPREHEN METABOLIC PANEL: CPT

## 2022-06-22 PROCEDURE — 82105 ALPHA-FETOPROTEIN SERUM: CPT

## 2022-06-22 PROCEDURE — 85610 PROTHROMBIN TIME: CPT

## 2022-06-22 PROCEDURE — 83735 ASSAY OF MAGNESIUM: CPT

## 2022-06-22 PROCEDURE — 86015 ACTIN ANTIBODY EACH: CPT

## 2022-06-22 PROCEDURE — 82306 VITAMIN D 25 HYDROXY: CPT

## 2022-06-22 PROCEDURE — 85025 COMPLETE CBC W/AUTO DIFF WBC: CPT

## 2022-06-22 PROCEDURE — 36415 COLL VENOUS BLD VENIPUNCTURE: CPT

## 2022-06-24 ENCOUNTER — HOSPITAL ENCOUNTER (INPATIENT)
Age: 72
LOS: 5 days | Discharge: NURSING, REHAB OR LONG-TERM CARE | DRG: 433 | End: 2022-06-29
Attending: INTERNAL MEDICINE | Admitting: INTERNAL MEDICINE
Payer: MEDICARE

## 2022-06-24 PROBLEM — K74.60 CIRRHOSIS (HCC): Status: ACTIVE | Noted: 2022-06-24

## 2022-06-24 LAB
A/G RATIO: 0.9 G/DL (ref 1–2.5)
AFP: 1.4 UG/L
ALBUMIN SERPL-MCNC: 2.4 G/DL (ref 3.5–5)
ALP BLD-CCNC: 138 U/L (ref 38–126)
ALT SERPL-CCNC: 33 U/L (ref 0–49)
AMMONIA: 45 UMOL/L (ref 9–30)
AMORPHOUS: NEGATIVE
ANION GAP SERPL CALCULATED.3IONS-SCNC: 13.2 MMOL/L (ref 8–16)
AST SERPL-CCNC: 122 U/L (ref 17–59)
BACTERIA: ABNORMAL
BASOPHILS RELATIVE PERCENT: 0.4 % (ref 0–1)
BILIRUB SERPL-MCNC: 4.5 MG/DL (ref 0.2–1.3)
BILIRUBIN URINE: POSITIVE
BUN BLDV-MCNC: 31 MG/DL (ref 9–20)
CALCIUM SERPL-MCNC: 8 MG/DL (ref 8.6–10.3)
CASTS: NEGATIVE
CHLORIDE BLD-SCNC: 92 MMOL/L (ref 98–107)
CLARITY: CLEAR
CO2: 28 MMOL/L (ref 22–30)
COAGULATION TISSUE FACTOR INDUCED BLD TIME PT. COAG: 17.4 SECS. (ref 9.4–12.2)
COLOR: YELLOW
CREAT SERPL-MCNC: 2.2 MG/DL (ref 0.66–1.25)
CRYSTALS, UA: NEGATIVE
EOSINOPHILS RELATIVE PERCENT: 1.1 % (ref 0–5)
EPITHELIAL CELLS: ABNORMAL
ERYTHROCYTE DISTRIBUTION WIDTH RBC RATIO: 18.4 % (ref 11.6–14.8)
ERYTHROCYTES URINE: ABNORMAL
ERYTHROCYTES URINE: ABNORMAL
ETHANOL: <10 MG/DL (ref 0–80)
GFR CALCULATED: 30
GLOBULIN: 2.7 G/DL (ref 2–3.5)
GLUCOSE BLD-MCNC: 79 MG/DL (ref 70–99)
GLUCOSE BLD-MCNC: 88 MG/DL (ref 74–100)
GLUCOSE URINE: 100
GRANULOCYTE ABSOLUTE COUNT: 5.9 X(10)3/UL (ref 1.8–7.2)
HCT VFR BLD CALC: 23.1 % (ref 37.4–53.8)
HEMOGLOBIN: 7.2 G/DL (ref 13.2–16.5)
IMMATURE GRANULOCYTES %: 0.4 % (ref 0–0.5)
INR BLD: 1.71 (ref 2–4)
KETONES, URINE: ABNORMAL
LACTIC ACID: 2 MMOL/L (ref 0.7–2.1)
LEUKOCYTE ESTERASE, URINE: NEGATIVE
LEUKOCYTES, UA: ABNORMAL
LIPASE: 259 U/L (ref 23–300)
LYMPHOCYTES ABSOLUTE: 1.1 X(10)3/UL (ref 1.1–2.7)
LYMPHOCYTES RELATIVE PERCENT: 13.4 % (ref 15–45)
MCH RBC QN AUTO: 28 PG (ref 27.7–33.3)
MCHC RBC AUTO-ENTMCNC: 31.2 G/DL (ref 32.8–35)
MCV RBC AUTO: 89.9 FL (ref 80.5–96.9)
MONOCYTES RELATIVE PERCENT: 15.1 % (ref 0–12)
MUCUS, URINE: NEGATIVE
NEUTROPHILS/100 LEUKOCYTES: 69.6 % (ref 40–70)
NITRITE SER/PLAS SCNC PT QN: NEGATIVE
PERFORMED ON: NORMAL
PH UA: 5.5
PLATELETS: 127 X1000 (ref 129–332)
POTASSIUM SERPL-SCNC: 3.2 MMOL/L (ref 3.4–5.1)
PROCALCITONIN: 0.24 NG/ML (ref 0–0.08)
PROTEIN FLUID: NEGATIVE MG/DL
RBC # BLD: 2.57 X1000000 (ref 4.16–5.62)
SODIUM BLD-SCNC: 130 MMOL/L (ref 137–145)
SPECIFIC GRAVITY UA: 1.02 (ref 1–1.03)
TOTAL PROTEIN: 5.1 G/DL (ref 6.3–8.2)
TRICHOMONAS: NEGATIVE
UROBILINOGEN, URINE: 2 MG/DL (ref 0.2–1)
WBC # BLD: 8.5 X1000 (ref 5.4–9.9)
YEAST, URINE: NEGATIVE

## 2022-06-24 PROCEDURE — 2060000000 HC ICU INTERMEDIATE R&B

## 2022-06-24 NOTE — RESULT ENCOUNTER NOTE
Significant anemia noted on labs. This is a drop from his prior hemoglobin.   Unclear who ordered these labs, but if not addressed, may need to present to the hospital for possible transfusion

## 2022-06-25 ENCOUNTER — APPOINTMENT (OUTPATIENT)
Dept: ULTRASOUND IMAGING | Age: 72
DRG: 433 | End: 2022-06-25
Attending: INTERNAL MEDICINE
Payer: MEDICARE

## 2022-06-25 LAB
A/G RATIO: 1 (ref 1.1–2.2)
ALBUMIN SERPL-MCNC: 2.4 G/DL (ref 3.4–5)
ALP BLD-CCNC: 120 U/L (ref 40–129)
ALT SERPL-CCNC: 29 U/L (ref 10–40)
ANION GAP SERPL CALCULATED.3IONS-SCNC: 12 MMOL/L (ref 3–16)
APTT: 45 SEC (ref 23–34.3)
AST SERPL-CCNC: 108 U/L (ref 15–37)
BASOPHILS ABSOLUTE: 0.2 K/UL (ref 0–0.2)
BASOPHILS RELATIVE PERCENT: 3.4 %
BILIRUB SERPL-MCNC: 3.5 MG/DL (ref 0–1)
BUN BLDV-MCNC: 31 MG/DL (ref 7–20)
CALCIUM SERPL-MCNC: 8.3 MG/DL (ref 8.3–10.6)
CHLORIDE BLD-SCNC: 92 MMOL/L (ref 99–110)
CHLORIDE URINE RANDOM: <20 MMOL/L
CO2: 30 MMOL/L (ref 21–32)
CREAT SERPL-MCNC: 2.5 MG/DL (ref 0.8–1.3)
CREATININE URINE: 142.5 MG/DL (ref 39–259)
EOSINOPHILS ABSOLUTE: 0.2 K/UL (ref 0–0.6)
EOSINOPHILS RELATIVE PERCENT: 3.9 %
GFR AFRICAN AMERICAN: 31
GFR NON-AFRICAN AMERICAN: 26
GLUCOSE BLD-MCNC: 75 MG/DL (ref 70–99)
GLUCOSE BLD-MCNC: 78 MG/DL (ref 70–99)
GLUCOSE BLD-MCNC: 79 MG/DL (ref 70–99)
GLUCOSE BLD-MCNC: 91 MG/DL (ref 70–99)
HCT VFR BLD CALC: 22.3 % (ref 40.5–52.5)
HEMOGLOBIN: 7.1 G/DL (ref 13.5–17.5)
INR BLD: 2.32 (ref 0.87–1.14)
LYMPHOCYTES ABSOLUTE: 0.6 K/UL (ref 1–5.1)
LYMPHOCYTES RELATIVE PERCENT: 10.7 %
MAGNESIUM: 1.4 MG/DL (ref 1.8–2.4)
MAGNESIUM: 1.4 MG/DL (ref 1.8–2.4)
MCH RBC QN AUTO: 28.5 PG (ref 26–34)
MCHC RBC AUTO-ENTMCNC: 31.9 G/DL (ref 31–36)
MCV RBC AUTO: 89.4 FL (ref 80–100)
MONOCYTES ABSOLUTE: 0.8 K/UL (ref 0–1.3)
MONOCYTES RELATIVE PERCENT: 14.8 %
NEUTROPHILS ABSOLUTE: 3.5 K/UL (ref 1.7–7.7)
NEUTROPHILS RELATIVE PERCENT: 67.2 %
PDW BLD-RTO: 19.8 % (ref 12.4–15.4)
PERFORMED ON: NORMAL
PLATELET # BLD: 109 K/UL (ref 135–450)
PMV BLD AUTO: 7 FL (ref 5–10.5)
POTASSIUM REFLEX MAGNESIUM: 3.1 MMOL/L (ref 3.5–5.1)
POTASSIUM, UR: 36.6 MMOL/L
PROTHROMBIN TIME: 25.4 SEC (ref 11.7–14.5)
RBC # BLD: 2.49 M/UL (ref 4.2–5.9)
SODIUM BLD-SCNC: 134 MMOL/L (ref 136–145)
SODIUM URINE: <20 MMOL/L
TOTAL PROTEIN: 4.9 G/DL (ref 6.4–8.2)
WBC # BLD: 5.2 K/UL (ref 4–11)

## 2022-06-25 PROCEDURE — 6370000000 HC RX 637 (ALT 250 FOR IP): Performed by: INTERNAL MEDICINE

## 2022-06-25 PROCEDURE — 84133 ASSAY OF URINE POTASSIUM: CPT

## 2022-06-25 PROCEDURE — 80053 COMPREHEN METABOLIC PANEL: CPT

## 2022-06-25 PROCEDURE — 2060000000 HC ICU INTERMEDIATE R&B

## 2022-06-25 PROCEDURE — 2580000003 HC RX 258: Performed by: INTERNAL MEDICINE

## 2022-06-25 PROCEDURE — 36415 COLL VENOUS BLD VENIPUNCTURE: CPT

## 2022-06-25 PROCEDURE — 83735 ASSAY OF MAGNESIUM: CPT

## 2022-06-25 PROCEDURE — 6360000002 HC RX W HCPCS: Performed by: INTERNAL MEDICINE

## 2022-06-25 PROCEDURE — 99223 1ST HOSP IP/OBS HIGH 75: CPT | Performed by: INTERNAL MEDICINE

## 2022-06-25 PROCEDURE — 85610 PROTHROMBIN TIME: CPT

## 2022-06-25 PROCEDURE — 82436 ASSAY OF URINE CHLORIDE: CPT

## 2022-06-25 PROCEDURE — P9047 ALBUMIN (HUMAN), 25%, 50ML: HCPCS | Performed by: INTERNAL MEDICINE

## 2022-06-25 PROCEDURE — 84300 ASSAY OF URINE SODIUM: CPT

## 2022-06-25 PROCEDURE — 76705 ECHO EXAM OF ABDOMEN: CPT

## 2022-06-25 PROCEDURE — 85025 COMPLETE CBC W/AUTO DIFF WBC: CPT

## 2022-06-25 PROCEDURE — 94640 AIRWAY INHALATION TREATMENT: CPT

## 2022-06-25 PROCEDURE — 82570 ASSAY OF URINE CREATININE: CPT

## 2022-06-25 PROCEDURE — 85730 THROMBOPLASTIN TIME PARTIAL: CPT

## 2022-06-25 RX ORDER — MIDODRINE HYDROCHLORIDE 10 MG/1
10 TABLET ORAL
Status: DISCONTINUED | OUTPATIENT
Start: 2022-06-25 | End: 2022-06-29 | Stop reason: HOSPADM

## 2022-06-25 RX ORDER — IPRATROPIUM BROMIDE AND ALBUTEROL SULFATE 2.5; .5 MG/3ML; MG/3ML
1 SOLUTION RESPIRATORY (INHALATION)
Status: DISCONTINUED | OUTPATIENT
Start: 2022-06-25 | End: 2022-06-25

## 2022-06-25 RX ORDER — NADOLOL 40 MG/1
20 TABLET ORAL DAILY
Status: DISCONTINUED | OUTPATIENT
Start: 2022-06-25 | End: 2022-06-29 | Stop reason: HOSPADM

## 2022-06-25 RX ORDER — POLYETHYLENE GLYCOL 3350 17 G/17G
17 POWDER, FOR SOLUTION ORAL DAILY PRN
Status: DISCONTINUED | OUTPATIENT
Start: 2022-06-25 | End: 2022-06-29 | Stop reason: HOSPADM

## 2022-06-25 RX ORDER — SODIUM CHLORIDE 9 MG/ML
INJECTION, SOLUTION INTRAVENOUS PRN
Status: DISCONTINUED | OUTPATIENT
Start: 2022-06-25 | End: 2022-06-29 | Stop reason: HOSPADM

## 2022-06-25 RX ORDER — SODIUM CHLORIDE 0.9 % (FLUSH) 0.9 %
5-40 SYRINGE (ML) INJECTION EVERY 12 HOURS SCHEDULED
Status: DISCONTINUED | OUTPATIENT
Start: 2022-06-25 | End: 2022-06-29 | Stop reason: HOSPADM

## 2022-06-25 RX ORDER — BUDESONIDE AND FORMOTEROL FUMARATE DIHYDRATE 160; 4.5 UG/1; UG/1
1 AEROSOL RESPIRATORY (INHALATION) 2 TIMES DAILY
Status: DISCONTINUED | OUTPATIENT
Start: 2022-06-25 | End: 2022-06-25

## 2022-06-25 RX ORDER — ACETAMINOPHEN 650 MG/1
650 SUPPOSITORY RECTAL EVERY 6 HOURS PRN
Status: DISCONTINUED | OUTPATIENT
Start: 2022-06-25 | End: 2022-06-29 | Stop reason: HOSPADM

## 2022-06-25 RX ORDER — ONDANSETRON 4 MG/1
4 TABLET, ORALLY DISINTEGRATING ORAL EVERY 8 HOURS PRN
Status: DISCONTINUED | OUTPATIENT
Start: 2022-06-25 | End: 2022-06-29 | Stop reason: HOSPADM

## 2022-06-25 RX ORDER — MIDODRINE HYDROCHLORIDE 5 MG/1
5 TABLET ORAL 2 TIMES DAILY
Status: DISCONTINUED | OUTPATIENT
Start: 2022-06-25 | End: 2022-06-25

## 2022-06-25 RX ORDER — ALLOPURINOL 100 MG/1
100 TABLET ORAL DAILY
Status: DISCONTINUED | OUTPATIENT
Start: 2022-06-26 | End: 2022-06-29 | Stop reason: HOSPADM

## 2022-06-25 RX ORDER — ALBUMIN (HUMAN) 12.5 G/50ML
25 SOLUTION INTRAVENOUS EVERY 6 HOURS
Status: COMPLETED | OUTPATIENT
Start: 2022-06-25 | End: 2022-06-26

## 2022-06-25 RX ORDER — LACTULOSE 10 G/15ML
20 SOLUTION ORAL
Status: DISCONTINUED | OUTPATIENT
Start: 2022-06-25 | End: 2022-06-26

## 2022-06-25 RX ORDER — ONDANSETRON 2 MG/ML
4 INJECTION INTRAMUSCULAR; INTRAVENOUS EVERY 6 HOURS PRN
Status: DISCONTINUED | OUTPATIENT
Start: 2022-06-25 | End: 2022-06-29 | Stop reason: HOSPADM

## 2022-06-25 RX ORDER — ACETAMINOPHEN 325 MG/1
650 TABLET ORAL EVERY 6 HOURS PRN
Status: DISCONTINUED | OUTPATIENT
Start: 2022-06-25 | End: 2022-06-29 | Stop reason: HOSPADM

## 2022-06-25 RX ORDER — POTASSIUM CHLORIDE 20 MEQ/1
20 TABLET, EXTENDED RELEASE ORAL
Status: DISCONTINUED | OUTPATIENT
Start: 2022-06-25 | End: 2022-06-27

## 2022-06-25 RX ORDER — POTASSIUM CHLORIDE 7.45 MG/ML
10 INJECTION INTRAVENOUS PRN
Status: DISCONTINUED | OUTPATIENT
Start: 2022-06-25 | End: 2022-06-29

## 2022-06-25 RX ORDER — BUDESONIDE AND FORMOTEROL FUMARATE DIHYDRATE 160; 4.5 UG/1; UG/1
1 AEROSOL RESPIRATORY (INHALATION) 2 TIMES DAILY
Status: DISCONTINUED | OUTPATIENT
Start: 2022-06-25 | End: 2022-06-29 | Stop reason: HOSPADM

## 2022-06-25 RX ORDER — SODIUM CHLORIDE 9 MG/ML
INJECTION, SOLUTION INTRAVENOUS CONTINUOUS
Status: DISCONTINUED | OUTPATIENT
Start: 2022-06-25 | End: 2022-06-26

## 2022-06-25 RX ORDER — BUSPIRONE HYDROCHLORIDE 10 MG/1
10 TABLET ORAL 2 TIMES DAILY
Status: DISCONTINUED | OUTPATIENT
Start: 2022-06-25 | End: 2022-06-26

## 2022-06-25 RX ORDER — CHOLECALCIFEROL (VITAMIN D3) 125 MCG
1000 CAPSULE ORAL DAILY
Status: DISCONTINUED | OUTPATIENT
Start: 2022-06-25 | End: 2022-06-29 | Stop reason: HOSPADM

## 2022-06-25 RX ORDER — PANTOPRAZOLE SODIUM 40 MG/1
40 TABLET, DELAYED RELEASE ORAL
Status: DISCONTINUED | OUTPATIENT
Start: 2022-06-25 | End: 2022-06-26

## 2022-06-25 RX ORDER — SODIUM CHLORIDE 0.9 % (FLUSH) 0.9 %
5-40 SYRINGE (ML) INJECTION PRN
Status: DISCONTINUED | OUTPATIENT
Start: 2022-06-25 | End: 2022-06-29 | Stop reason: HOSPADM

## 2022-06-25 RX ORDER — MAGNESIUM SULFATE IN WATER 40 MG/ML
2000 INJECTION, SOLUTION INTRAVENOUS PRN
Status: DISCONTINUED | OUTPATIENT
Start: 2022-06-25 | End: 2022-06-29 | Stop reason: HOSPADM

## 2022-06-25 RX ORDER — VITAMIN B COMPLEX
2000 TABLET ORAL DAILY
Status: DISCONTINUED | OUTPATIENT
Start: 2022-06-25 | End: 2022-06-29 | Stop reason: HOSPADM

## 2022-06-25 RX ORDER — IPRATROPIUM BROMIDE AND ALBUTEROL SULFATE 2.5; .5 MG/3ML; MG/3ML
1 SOLUTION RESPIRATORY (INHALATION) 2 TIMES DAILY
Status: DISCONTINUED | OUTPATIENT
Start: 2022-06-25 | End: 2022-06-29 | Stop reason: HOSPADM

## 2022-06-25 RX ORDER — POTASSIUM CHLORIDE 20 MEQ/1
40 TABLET, EXTENDED RELEASE ORAL PRN
Status: DISCONTINUED | OUTPATIENT
Start: 2022-06-25 | End: 2022-06-29

## 2022-06-25 RX ORDER — ALBUTEROL SULFATE 90 UG/1
2 AEROSOL, METERED RESPIRATORY (INHALATION) EVERY 4 HOURS PRN
Status: DISCONTINUED | OUTPATIENT
Start: 2022-06-25 | End: 2022-06-29 | Stop reason: HOSPADM

## 2022-06-25 RX ORDER — VENLAFAXINE HYDROCHLORIDE 37.5 MG/1
37.5 CAPSULE, EXTENDED RELEASE ORAL DAILY
Status: DISCONTINUED | OUTPATIENT
Start: 2022-06-25 | End: 2022-06-26

## 2022-06-25 RX ORDER — IPRATROPIUM BROMIDE AND ALBUTEROL SULFATE 2.5; .5 MG/3ML; MG/3ML
1 SOLUTION RESPIRATORY (INHALATION) EVERY 4 HOURS
Status: DISCONTINUED | OUTPATIENT
Start: 2022-06-25 | End: 2022-06-25

## 2022-06-25 RX ORDER — TORSEMIDE 20 MG/1
10 TABLET ORAL DAILY
Status: DISCONTINUED | OUTPATIENT
Start: 2022-06-25 | End: 2022-06-25

## 2022-06-25 RX ORDER — ALBUTEROL SULFATE 90 UG/1
2 AEROSOL, METERED RESPIRATORY (INHALATION) EVERY 6 HOURS PRN
Status: DISCONTINUED | OUTPATIENT
Start: 2022-06-25 | End: 2022-06-25

## 2022-06-25 RX ORDER — LACTOBACILLUS RHAMNOSUS GG 10B CELL
1 CAPSULE ORAL DAILY
Status: DISCONTINUED | OUTPATIENT
Start: 2022-06-25 | End: 2022-06-29 | Stop reason: HOSPADM

## 2022-06-25 RX ORDER — ALLOPURINOL 300 MG/1
300 TABLET ORAL DAILY
Status: DISCONTINUED | OUTPATIENT
Start: 2022-06-25 | End: 2022-06-25

## 2022-06-25 RX ORDER — MIDODRINE HYDROCHLORIDE 10 MG/1
10 TABLET ORAL 2 TIMES DAILY
Status: DISCONTINUED | OUTPATIENT
Start: 2022-06-25 | End: 2022-06-25

## 2022-06-25 RX ADMIN — Medication 10 ML: at 20:46

## 2022-06-25 RX ADMIN — POTASSIUM CHLORIDE 40 MEQ: 1500 TABLET, EXTENDED RELEASE ORAL at 05:01

## 2022-06-25 RX ADMIN — LACTULOSE 20 G: 10 SOLUTION ORAL at 11:15

## 2022-06-25 RX ADMIN — SODIUM CHLORIDE: 9 INJECTION, SOLUTION INTRAVENOUS at 13:02

## 2022-06-25 RX ADMIN — MIDODRINE HYDROCHLORIDE 10 MG: 10 TABLET ORAL at 09:18

## 2022-06-25 RX ADMIN — ALBUMIN (HUMAN) 25 G: 0.25 INJECTION, SOLUTION INTRAVENOUS at 13:17

## 2022-06-25 RX ADMIN — LACTULOSE 20 G: 10 SOLUTION ORAL at 16:40

## 2022-06-25 RX ADMIN — MAGNESIUM SULFATE HEPTAHYDRATE 2000 MG: 40 INJECTION, SOLUTION INTRAVENOUS at 05:06

## 2022-06-25 RX ADMIN — IPRATROPIUM BROMIDE AND ALBUTEROL SULFATE 3 ML: 2.5; .5 SOLUTION RESPIRATORY (INHALATION) at 07:54

## 2022-06-25 RX ADMIN — BUSPIRONE HYDROCHLORIDE 10 MG: 10 TABLET ORAL at 20:45

## 2022-06-25 RX ADMIN — IPRATROPIUM BROMIDE AND ALBUTEROL SULFATE 3 ML: 2.5; .5 SOLUTION RESPIRATORY (INHALATION) at 20:27

## 2022-06-25 RX ADMIN — PANTOPRAZOLE SODIUM 40 MG: 40 TABLET, DELAYED RELEASE ORAL at 16:40

## 2022-06-25 RX ADMIN — Medication 1 CAPSULE: at 09:18

## 2022-06-25 RX ADMIN — POTASSIUM CHLORIDE 20 MEQ: 1500 TABLET, EXTENDED RELEASE ORAL at 09:19

## 2022-06-25 RX ADMIN — LACTULOSE 20 G: 10 SOLUTION ORAL at 20:45

## 2022-06-25 RX ADMIN — Medication 1 PUFF: at 20:28

## 2022-06-25 RX ADMIN — VENLAFAXINE HYDROCHLORIDE 37.5 MG: 37.5 CAPSULE, EXTENDED RELEASE ORAL at 09:18

## 2022-06-25 RX ADMIN — ALBUMIN (HUMAN) 25 G: 0.25 INJECTION, SOLUTION INTRAVENOUS at 18:23

## 2022-06-25 RX ADMIN — BUSPIRONE HYDROCHLORIDE 10 MG: 10 TABLET ORAL at 01:28

## 2022-06-25 RX ADMIN — ALLOPURINOL 300 MG: 300 TABLET ORAL at 09:18

## 2022-06-25 RX ADMIN — MIDODRINE HYDROCHLORIDE 10 MG: 10 TABLET ORAL at 16:40

## 2022-06-25 RX ADMIN — PANTOPRAZOLE SODIUM 40 MG: 40 TABLET, DELAYED RELEASE ORAL at 06:34

## 2022-06-25 ASSESSMENT — ENCOUNTER SYMPTOMS
EYES NEGATIVE: 1
ALLERGIC/IMMUNOLOGIC NEGATIVE: 1
GASTROINTESTINAL NEGATIVE: 1
RESPIRATORY NEGATIVE: 1

## 2022-06-25 NOTE — PROGRESS NOTES
Patient resting in bed, AM assessment completed. Patient very lethargic, awakens easily to voice but falls back to sleep quickly. Lungs decreased. BS+. 2+ edema noted to BLE. Attends in place. Large bruise noted on RLE and hip. No acute distress noted. VSS. Call light in reach. Will continue to monitor.

## 2022-06-25 NOTE — PROGRESS NOTES
RT Inhaler-Nebulizer Bronchodilator Protocol Note    There is a bronchodilator order in the chart from a provider indicating to follow the RT Bronchodilator Protocol and there is an Initiate RT Inhaler-Nebulizer Bronchodilator Protocol order as well (see protocol at bottom of note). CXR Findings:  No results found. The findings from the last RT Protocol Assessment were as follows:   History Pulmonary Disease: Chronic pulmonary disease  Respiratory Pattern: Regular pattern and RR 12-20 bpm  Breath Sounds: Slightly diminished and/or crackles  Cough: Strong, spontaneous, non-productive  Indication for Bronchodilator Therapy: Decreased or absent breath sounds  Bronchodilator Assessment Score: 4    Aerosolized bronchodilator medication orders have been revised according to the RT Inhaler-Nebulizer Bronchodilator Protocol below. Respiratory Therapist to perform RT Therapy Protocol Assessment initially then follow the protocol. Repeat RT Therapy Protocol Assessment PRN for score 0-3 or on second treatment, BID, and PRN for scores above 3. No Indications - adjust the frequency to every 6 hours PRN wheezing or bronchospasm, if no treatments needed after 48 hours then discontinue using Per Protocol order mode. If indication present, adjust the RT bronchodilator orders based on the Bronchodilator Assessment Score as indicated below. Use Inhaler orders unless patient has one or more of the following: on home nebulizer, not able to hold breath for 10 seconds, is not alert and oriented, cannot activate and use MDI correctly, or respiratory rate 25 breaths per minute or more, then use the equivalent nebulizer order(s) with same Frequency and PRN reasons based on the score. If a patient is on this medication at home then do not decrease Frequency below that used at home.     0-3 - enter or revise RT bronchodilator order(s) to equivalent RT Bronchodilator order with Frequency of every 4 hours PRN for wheezing or increased work of breathing using Per Protocol order mode. 4-6 - enter or revise RT Bronchodilator order(s) to two equivalent RT bronchodilator orders with one order with BID Frequency and one order with Frequency of every 4 hours PRN wheezing or increased work of breathing using Per Protocol order mode. 7-10 - enter or revise RT Bronchodilator order(s) to two equivalent RT bronchodilator orders with one order with TID Frequency and one order with Frequency of every 4 hours PRN wheezing or increased work of breathing using Per Protocol order mode. 11-13 - enter or revise RT Bronchodilator order(s) to one equivalent RT bronchodilator order with QID Frequency and an Albuterol order with Frequency of every 4 hours PRN wheezing or increased work of breathing using Per Protocol order mode. Greater than 13 - enter or revise RT Bronchodilator order(s) to one equivalent RT bronchodilator order with every 4 hours Frequency and an Albuterol order with Frequency of every 2 hours PRN wheezing or increased work of breathing using Per Protocol order mode.          Electronically signed by Jackeline Lopez RCP on 6/25/2022 at 4:49 AM

## 2022-06-25 NOTE — PROGRESS NOTES
RT Inhaler-Nebulizer Bronchodilator Protocol Note    There is a bronchodilator order in the chart from a provider indicating to follow the RT Bronchodilator Protocol and there is an Initiate RT Inhaler-Nebulizer Bronchodilator Protocol order as well (see protocol at bottom of note). CXR Findings:  No results found. The findings from the last RT Protocol Assessment were as follows:   History Pulmonary Disease: (P) Chronic pulmonary disease  Respiratory Pattern: (P) Regular pattern and RR 12-20 bpm  Breath Sounds: (P) Slightly diminished and/or crackles  Cough: (P) Strong, spontaneous, non-productive  Indication for Bronchodilator Therapy: (P) Decreased or absent breath sounds  Bronchodilator Assessment Score: (P) 4    Aerosolized bronchodilator medication orders have been revised according to the RT Inhaler-Nebulizer Bronchodilator Protocol below. Respiratory Therapist to perform RT Therapy Protocol Assessment initially then follow the protocol. Repeat RT Therapy Protocol Assessment PRN for score 0-3 or on second treatment, BID, and PRN for scores above 3. No Indications - adjust the frequency to every 6 hours PRN wheezing or bronchospasm, if no treatments needed after 48 hours then discontinue using Per Protocol order mode. If indication present, adjust the RT bronchodilator orders based on the Bronchodilator Assessment Score as indicated below. Use Inhaler orders unless patient has one or more of the following: on home nebulizer, not able to hold breath for 10 seconds, is not alert and oriented, cannot activate and use MDI correctly, or respiratory rate 25 breaths per minute or more, then use the equivalent nebulizer order(s) with same Frequency and PRN reasons based on the score. If a patient is on this medication at home then do not decrease Frequency below that used at home.     0-3 - enter or revise RT bronchodilator order(s) to equivalent RT Bronchodilator order with Frequency of every 4 hours PRN for wheezing or increased work of breathing using Per Protocol order mode. 4-6 - enter or revise RT Bronchodilator order(s) to two equivalent RT bronchodilator orders with one order with BID Frequency and one order with Frequency of every 4 hours PRN wheezing or increased work of breathing using Per Protocol order mode. 7-10 - enter or revise RT Bronchodilator order(s) to two equivalent RT bronchodilator orders with one order with TID Frequency and one order with Frequency of every 4 hours PRN wheezing or increased work of breathing using Per Protocol order mode. 11-13 - enter or revise RT Bronchodilator order(s) to one equivalent RT bronchodilator order with QID Frequency and an Albuterol order with Frequency of every 4 hours PRN wheezing or increased work of breathing using Per Protocol order mode. Greater than 13 - enter or revise RT Bronchodilator order(s) to one equivalent RT bronchodilator order with every 4 hours Frequency and an Albuterol order with Frequency of every 2 hours PRN wheezing or increased work of breathing using Per Protocol order mode. RT to enter RT Home Evaluation for COPD & MDI Assessment order using Per Protocol order mode.     Electronically signed by Rose Faust RCP on 6/25/2022 at 7:57 AM

## 2022-06-25 NOTE — CONSULTS
[facility]  Nephrology Consult Note           Reason for Consult: SEAN  Requesting Physician:  Dr. Maci Francois    Chief Complaint:  No chief complaint on file. History of Present Illness on 6/25/2022:    67 y.o. yo male with PMH of CAD status post CABG, COPD, hypertension, LINSEY, alcoholic cirrhosis who is admitted for SEAN, increasing ascites and lethargy  Patient fell at home on Thursday and needed 2 person to get him up; will continue to be weak and not able to do his usual activities and started getting more lethargic as well. It is noted by the wife that his abdomen was getting bigger as well.   Patient was recently seen in GI clinic and was maintained on diuretics  As the symptoms persisted, patient was taken to outside hospital ER and has been transferred to [facility] last night  Creatinine has increased to 2.5 and nephrology has been consulted  Upon chart review, baseline creatinine seems to be around the low ones; creatinine was 1.5 on 6/22    Past Medical History:        Diagnosis Date    Acid reflux     Acute alcohol intoxication ([address] 11/20/2017    Acute respiratory failure with hypoxemia ([address] 11/20/2017    Alcohol withdrawal ([address] 11/14/2009    Anxiety     CAD (coronary artery disease)     COPD (chronic obstructive pulmonary disease) ([address]     Hyperlipidemia     Hypertension     LINSEY (obstructive sleep apnea)     PTSD (post-traumatic stress disorder)        Past Surgical History:        Procedure Laterality Date    CARDIAC SURGERY      cabg    CATARACT REMOVAL WITH IMPLANT Left 08/02/2016    PHACO EMULSIFICATION OF CATARACT WITH INTRAOCULAR LENS    CATARACT REMOVAL WITH IMPLANT Right 08/22/2016    COLONOSCOPY  2011    normal    COLONOSCOPY  12/15/2016    COLONOSCOPY  12/01/2021    COLONOSCOPY N/A 12/1/2021    COLONOSCOPY WITH BIOPSY performed by Chaya Sexton MD at [address] [facility] CT NEEDLE BIOPSY LIVER PERCUTANEOUS  1/17/2022    CT NEEDLE BIOPSY LIVER PERCUTANEOUS [place] [facility] [place]    EYE SURGERY      cataract bilateral    HERNIA REPAIR      umbilical    KNEE ARTHROSCOPY      UPPER GASTROINTESTINAL ENDOSCOPY  12/01/2021    UPPER GASTROINTESTINAL ENDOSCOPY N/A 12/1/2021    EGD W/LILO. (10:45) performed by Hernandez Lyon MD at [address] Medications:    No current facility-administered medications on file prior to encounter.      Current Outpatient Medications on File Prior to Encounter   Medication Sig Dispense Refill    Probiotic Product (PROBIOTIC DAILY PO) Take 1 capsule by mouth daily Over the counter      allopurinol (ZYLOPRIM) 300 MG tablet TAKE ONE TABLET BY MOUTH DAILY 90 tablet 1    pantoprazole (PROTONIX) 40 MG tablet Take 40 mg by mouth in the morning and at bedtime       apixaban (ELIQUIS) 5 MG TABS tablet Take by mouth 2 times daily      venlafaxine (EFFEXOR) 37.5 MG tablet Take 37.5 mg by mouth      midodrine (PROAMATINE) 5 MG tablet Take 5 mg by mouth in the morning and at bedtime      nadolol (CORGARD) 20 MG tablet Take 20 mg by mouth daily      potassium chloride (KLOR-CON M) 20 MEQ TBCR extended release tablet Take 20 mEq by mouth daily (with breakfast)      budesonide-formoterol (SYMBICORT) 160-4.5 MCG/ACT AERO INHALE 1 PUFF BY MOUTH TWICE DAILY 3 each 3    busPIRone (BUSPAR) 10 MG tablet Take 1 tablet by mouth twice daily 180 tablet 3    torsemide (DEMADEX) 20 MG tablet Take 0.5 tablets by mouth daily 30 tablet 0    albuterol sulfate  (90 Base) MCG/ACT inhaler INHALE 2 PUFFS BY MOUTH EVERY 6 HOURS AS NEEDED FOR WHEEZING 9 g 3    dapagliflozin (FARXIGA) 5 MG tablet Take 5 mg by mouth every morning      ferrous sulfate (IRON 325) 325 (65 Fe) MG tablet Take 1 tab with breakfast  90 tablet 3    ipratropium-albuterol (DUONEB) 0.5-2.5 (3) MG/3ML SOLN nebulizer solution Inhale 3 mLs into the lungs every 4 hours 360 mL 2    Cholecalciferol (VITAMIN D3) 50 MCG (2000 UT) CAPS Take 1 capsule by mouth daily      Coenzyme Q10 (COQ10) 100 MG CAPS Take 1 capsule by mouth daily      MYRBETRIQ 50 MG TB24 Take 50 mg by mouth daily       vitamin B-12 (CYANOCOBALAMIN) 1000 MCG tablet Take 1 tablet by mouth daily 90 tablet 1       Allergies:  Lisinopril, Penicillins, Sulfa antibiotics, and Tetanus toxoids    Social History:    Social History     Socioeconomic History    Marital status:      Spouse name: Not on file    Number of children: Not on file    Years of education: Not on file    Highest education level: Not on file   Occupational History    Not on file   Tobacco Use    Smoking status: Former Smoker     Packs/day: 2.00     Years: 40.00     Pack years: 80.00     Types: Cigarettes     Quit date: 2002     Years since quittin.4    Smokeless tobacco: Current User     Types: Snuff    Tobacco comment: 2016 placed in avs   Vaping Use    Vaping Use: Never used   Substance and Sexual Activity    Alcohol use: Yes     Alcohol/week: 10.0 - 12.0 standard drinks     Types: 10 - 12 Shots of liquor per week     Comment: 10-12 shots / day    Drug use: No    Sexual activity: Not Currently     Partners: Female   Other Topics Concern    Not on file   Social History Narrative    Not on file     Social Determinants of Health     Financial Resource Strain: Low Risk     Difficulty of Paying Living Expenses: Not hard at all   Food Insecurity: No Food Insecurity    Worried About Running Out of Food in the Last Year: Never true    Bharat of Food in the Last Year: Never true   Transportation Needs:     Lack of Transportation (Medical): Not on file    Lack of Transportation (Non-Medical):  Not on file   Physical Activity:     Days of Exercise per Week: Not on file    Minutes of Exercise per Session: Not on file   Stress:     Feeling of Stress : Not on file   Social Connections:     Frequency of Communication with Friends and Family: Not on file    Frequency of Social Gatherings with Friends and Family: Not on file    Attends Restorationist Services: Not on file    Active Member of Clubs or Organizations: Not on file    Attends Club or Organization Meetings: Not on file    Marital Status: Not on file   Intimate Partner Violence:     Fear of Current or Ex-Partner: Not on file    Emotionally Abused: Not on file    Physically Abused: Not on file    Sexually Abused: Not on file   Housing Stability:     Unable to Pay for Housing in the Last Year: Not on file    Number of [place] in the Last Year: Not on file    Unstable Housing in the Last Year: Not on file       Family History:   Family History   Problem Relation Age of Onset    Cancer Father         skin    Cancer Paternal Cousin         melanoma-thigh       Review of Systems:   Unable to obtain    Physical exam:   Constitutional:  VITALS:  BP (!) 101/56   Pulse 64   Temp 97.5 °F (36.4 °C) (Oral)   Resp 18   Ht 6' (1.829 m)   Wt 219 lb 6.4 oz (99.5 kg)   SpO2 97%   BMI 29.76 kg/m²   Gen: Arousable, but very sleepy  Neck: No JVD  Skin: Jaundiced  Cardiovascular:  S1, S2 without m/r/g   Respiratory: Creased breath sounds at the bases  Abdomen:  soft, nt, nd,   Extremities: 1+ lower extremity edema  Neuro/Psy: AAoriented times 1-2 ; moves all 4 ext    Data/  Recent Labs     06/24/22  1147 06/25/22  0031   WBC 8.5 5.2   HGB 7.2* 7.1*   HCT 23.1* 22.3*   MCV 89.9 89.4   * 109*     Recent Labs     06/24/22  1147 06/25/22  0031   * 134*   K 3.2* 3.1*   CL 92* 92*   CO2 28 30   GLUCOSE  --  79   MG  --  1.40*  1.40*   BUN 31* 31*   CREATININE 2.2* 2.5*   LABGLOM 30 26*   GFRAA  --  31*     Chest x-ray clear  UA bland    Assessment  -SEAN in the setting of liver cirrhosis, hypotension, anemia, diuretic use with bland UA. Most likely is prerenal in etiology.   Hepatorenal syndrome cannot be ruled out at present    -Hypokalemia    -Hypomagnesemia    -Hypotension    -Anemia and thrombocytopenia   Platelet count likely related to cirrhosis of liver   Hemoglobin down to 7.1 on last check, defer to GI   Eliquis on hold    -Alcoholic liver cirrhosis with hepatic encephalopathy and ascites    Plan  -If paracentesis is planned, please do not remove more than 4 L with SEAN ongoing  -Replace magnesium, potassium as ordered [received total 60 M EQ p.o. potassium chloride on 6/25 along with IV magnesium of 2 g]  -IV albumin as ordered  -Midodrine has been increased to 10 mg 3 times daily  -IV fluid as ordered  -Urine sodium, creatinine  -Hold Farxiga, diuretics and antihypertensives  -Hold nadolol for systolic blood pressure less than 110  -Decrease allopurinol 200 mg daily  -Serial renal panel  -daily wts and strict i/o  -renal dose medications   -avoid nephrotoxins      Thank you for the consultation. Please do not hesitate to call with questions. Le Funez MD  Office: [phone]  Fax:    [phone] [address] [facility]

## 2022-06-25 NOTE — PROGRESS NOTES
Admission assessment complete. Call light and bedside table within reach. Wife Tammi Marmolejo at bedside, assisted with admission questions. Perfect serve sent to  updating him that pt here from [facility].   4 Eyes Skin Assessment     The patient is being assess for   Admission    I agree that 2 RN's have performed a thorough Head to Toe Skin Assessment on the patient. ALL assessment sites listed below have been assessed. Areas assessed for pressure by both nurses:   [x]   Head, Face, and Ears   [x]   Shoulders, Back, and Chest, Abdomen  [x]   Arms, Elbows, and Hands   [x]   Coccyx, Sacrum, and Ischium  [x]   Legs, Feet, and Heels  Bruising to right hip and right outer thigh. pitting edema to legs. Skin Assessed Under all Medical Devices by both nurses:  NA           All Mepilex Borders were peeled back and area peeked at by both nurses:NA             **SHARE this note so that the co-signing nurse is able to place an eSignature**    Co-signer eSignature: Electronically signed by Marvin Lindsey RN on 6/24/22 at 10:09 PM EDT    Does the Patient have Skin Breakdown related to pressure?   No              Ulices Prevention initiated:  Yes   Wound Care Orders initiated:  EFRAÍN      [facility] nurse consulted for Pressure Injury (Stage 3,4, Unstageable, DTI, NWPT, Complex wounds)and New or Established Ostomies:  NA      Primary Nurse eSignature: Electronically signed by Latesha Francisco RN on 6/24/22 at 10:04 PM EDT

## 2022-06-25 NOTE — CONSULTS
HERNIA REPAIR      umbilical    KNEE ARTHROSCOPY      UPPER GASTROINTESTINAL ENDOSCOPY  2021    UPPER GASTROINTESTINAL ENDOSCOPY N/A 2021    EGD W/ANES. (10:45) performed by Tiffanie James MD at [address]:   Social History     Tobacco Use    Smoking status: Former Smoker     Packs/day: 2.00     Years: 40.00     Pack years: 80.00     Types: Cigarettes     Quit date: 2002     Years since quittin.4    Smokeless tobacco: Current User     Types: Snuff    Tobacco comment: 2016 placed in avs   Substance Use Topics    Alcohol use: Yes     Alcohol/week: 10.0 - 12.0 standard drinks     Types: 10 - 12 Shots of liquor per week     Comment: 10-12 shots / day     Family:   Family History   Problem Relation Age of Onset    Cancer Father         skin    Cancer Paternal Cousin         melanoma-thigh       Scheduled Medications:    [Held by provider] apixaban  5 mg Oral BID    allopurinol  300 mg Oral Daily    busPIRone  10 mg Oral BID    Vitamin D  2,000 Units Oral Daily    nadolol  20 mg Oral Daily    lactobacillus  1 capsule Oral Daily    pantoprazole  40 mg Oral BID AC    potassium chloride  20 mEq Oral Daily with breakfast    venlafaxine  37.5 mg Oral Daily    vitamin B-12  1,000 mcg Oral Daily    sodium chloride flush  5-40 mL IntraVENous 2 times per day    budesonide-formoterol  1 puff Inhalation BID    ipratropium-albuterol  1 vial Inhalation BID    midodrine  10 mg Oral BID    lactulose  20 g Oral 6 times per day     Infusions:    sodium chloride       PRN Medications: sodium chloride flush, sodium chloride, ondansetron **OR** ondansetron, polyethylene glycol, acetaminophen **OR** acetaminophen, potassium chloride **OR** potassium alternative oral replacement **OR** potassium chloride, magnesium sulfate, albuterol sulfate HFA  Allergies: Allergies   Allergen Reactions    Lisinopril Other (See Comments)     angioedema is what he had.   See 12/ 2017 ER visit and [facility] hospitalization    Penicillins Hives    Sulfa Antibiotics Hives    Tetanus Toxoids Hives       ROS:   Review of Systems   Constitutional: Negative. HENT: Negative. Eyes: Negative. Respiratory: Negative. Cardiovascular: Negative. Gastrointestinal: Negative. Endocrine: Negative. Genitourinary: Negative. Musculoskeletal: Negative. Skin: Negative. Allergic/Immunologic: Negative. Neurological: Negative. Hematological: Negative. Psychiatric/Behavioral: Negative. Objective:     Physical Exam:   Vitals:    06/25/22 0845   BP: (!) 101/56   Pulse: 64   Resp: 18   Temp: 97.5 °F (36.4 °C)   SpO2: 97%     No intake/output data recorded.    General appearance: alert, appears stated age and cooperative  HEENT: PERRLA  Neck: no adenopathy, no carotid bruit, no JVD, supple, symmetrical, trachea midline and thyroid not enlarged, symmetric, no tenderness/mass/nodules  Lungs: clear to auscultation bilaterally  Heart: regular rate and rhythm, S1, S2 normal, no murmur, click, rub or gallop  Abdomen: soft, non-tender; bowel sounds normal; no masses,  no organomegaly  Extremities: extremities normal, atraumatic, no cyanosis or edema     Lab and Imaging Review   Labs:  CBC:   Recent Labs     06/24/22  1147 06/25/22  0031   WBC 8.5 5.2   HGB 7.2* 7.1*   HCT 23.1* 22.3*   MCV 89.9 89.4   * 109*     BMP:   Recent Labs     06/24/22  1147 06/25/22  0031   * 134*   K 3.2* 3.1*   CL 92* 92*   CO2 28 30   BUN 31* 31*   CREATININE 2.2* 2.5*     LIVER PROFILE:   Recent Labs     06/24/22 1147 06/25/22  0031   * 108*   ALT 33 29   LIPASE 259  --    PROT 5.1* 4.9*   BILITOT 4.5* 3.5*   ALKPHOS 138* 120     PT/INR:   Recent Labs     06/24/22 1147 06/25/22  0031   INR 1.71* 2.32*       IMAGING:  CT HEAD WO CONTRAST    Result Date: 6/24/2022  RADIOLOGY REPORT Patient: Jaquan DAS) MRN: [id number] Location: [facility] Referring Physician: Carlos Ruano Account Number: [de-identified] Exam Date/Time: 2022 15:47:17 : 1950 [facility]. Addr.: [id number] [address] [id number] Primary Physician: Becky Erwin HX: Head Trauma with pain CT BRAIN W/O CONTRAST - CT BRAIN TECHNICAL: Contrast Pharmaceutical: Axial 2.5 mm imaging through the head without contrast. Sagittal coronal reformats. CONTRAST MEDIA: ï¿½None. ï¿½ CLINICAL INFORMATION: ï¿½Head trauma. COMPARISON: ï¿½No comparison exams available at the time of dictation. ï¿½ FINDINGS: Extra-Axial Spaces: None. Intracranial Hemorrhage: None. Ventricular System: Normal. Basal Cisterns: Normal. Cerebral Parenchyma: No focal infarct, hemorrhage or mass. Moderate low-density central white matter consistent with moderate microvascular ischemic white matter change. Moderate sulcal enlargement indicative of moderate generalized parenchymal loss. Midline Shift: None. Cerebellum: None. Brainstem: Normal. Calvarium: Normal. Vascular System: Normal. Paranasal Sinuses and Mastoid Air Cells: Normal. Visualized Orbits: Normal. Visualized Upper Cervical Spine: Normal. Sella: Normal. Skull Base: Normal. IMPRESSION: 1. No acute intracranial abnormality. 2. Moderate generalized parenchymal loss with moderate microvascular ischemic white matter change. Report electronically signed by:  Steve Belle MD  on , 04:02 PM EDT     XR CHEST PORTABLE    Result Date: 2022  RADIOLOGY REPORT Patient: Donell MCGOVERN(M) MRN: [id number] Location: [facility] Referring Physician: [id number] Account Number: [de-identified] Exam Date/Time: 2022 13:29:04 : 1950 [place]. Addr.: [id number] Maura Ryan [id number] Primary Physician: Eusebio Jaffe HX: weakness CHEST XRAY PORTABLE - CHEST COMPARISON: Chest x-ray from 3/15/2019. Time Performed: 2022. FINDINGS: CARDIOVASCULAR: The cardiomediastinal silhouette is top normal little changed.  LUNGS: Mild elevation right hemidiaphragm unchanged and probably chronic. No focal airspace disease identified. No significant pleural effusion or pneumothorax identified. OSSEOUS STRUCTURES: Bony structures are intact. TUBES/LINES/IMPLANTABLE DEVICES: None. IMPRESSION: No acute lung disease. Mild peribronchial increased markings in the right mid and lower lung on the prior study have since resolved. Report electronically signed by:  Raisa Pierson MD  on 2022, Jun 24, 01:47 PM EDT        Hospital Problems           Last Modified POA    * (Principal) Cirrhosis ([address] 6/24/2022 Yes        Assessment:   68 yo male with alcoholic cirrhosis c/b varices presents with ascites and encephalopathy    Plan:   1. Plan paracentesis with fluid count  2. Optimize lactulose  3. Monitor for evidence of bleeding, infection during hospital stay. OK with discharge when back to baseline, possibly 1-3 days.       Natalie Tripp DO  10:07 AM 6/25/2022            [facility]    [address]      [address] [place] [address]     Phone: [phone]     Fax: [phone]

## 2022-06-25 NOTE — H&P
Hospital Medicine History & Physical      PCP: Myla Zeng MD    Date of Admission: 6/24/2022    Date of Service: Pt seen/examined on 6/25/2022 and Admitted to Inpatient    Chief Complaint:  AMS, fall at home, unable to get up on his own. History Of Present Illness: The patient is a 67 y.o. male advanced liver cirrhosis patient with active ongoing alcohol use, who presents after a fall and with AMS. Pt is awake and oriented to place and situation, though very weak and drowsy appearing and does not participate in history. Wife at bedside. She reports he saw his GI on Wed and was planned for OP paracentesis. On Thursday he fell at home - they did not witness it, but found him on the ground and she and his son were able to help him back in bed. Friday he continued weak and very drowsy and intermittently confused and they took him to ED at [facility].            Past Medical History:        Diagnosis Date    Acid reflux     Acute alcohol intoxication ([facility] 11/20/2017    Acute respiratory failure with hypoxemia (HCC) 11/20/2017    Alcohol withdrawal ([facility] 11/14/2009    Anxiety     CAD (coronary artery disease)     COPD (chronic obstructive pulmonary disease) (HCC)     Hyperlipidemia     Hypertension     LINSEY (obstructive sleep apnea)     PTSD (post-traumatic stress disorder)        Past Surgical History:        Procedure Laterality Date    CARDIAC SURGERY      cabg    CATARACT REMOVAL WITH IMPLANT Left 08/02/2016    PHACO EMULSIFICATION OF CATARACT WITH INTRAOCULAR LENS    CATARACT REMOVAL WITH IMPLANT Right 08/22/2016    COLONOSCOPY  2011    normal    COLONOSCOPY  12/15/2016    COLONOSCOPY  12/01/2021    COLONOSCOPY N/A 12/1/2021    COLONOSCOPY WITH BIOPSY performed by Radha Barclay MD at [address] [facility] NEEDLE BIOPSY LIVER PERCUTANEOUS  1/17/2022    CT NEEDLE BIOPSY LIVER PERCUTANEOUS 1/17/2022 [facility] CT SCAN    EYE SURGERY      cataract bilateral    HERNIA REPAIR      umbilical    KNEE ARTHROSCOPY      UPPER GASTROINTESTINAL ENDOSCOPY  12/01/2021    UPPER GASTROINTESTINAL ENDOSCOPY N/A 12/1/2021    EGD W/LILO. (10:45) performed by Elisabeth Wells MD at [address] [place] [address]       Medications Prior to Admission:    Prior to Admission medications    Medication Sig Start Date End Date Taking?  Authorizing Provider   Probiotic Product (PROBIOTIC DAILY PO) Take 1 capsule by mouth daily Over the counter   Yes Historical Provider, MD   allopurinol (ZYLOPRIM) 300 MG tablet TAKE ONE TABLET BY MOUTH DAILY 6/6/22   MO Lynch - CNP   pantoprazole (PROTONIX) 40 MG tablet Take 40 mg by mouth in the morning and at bedtime     Historical Provider, MD   apixaban (ELIQUIS) 5 MG TABS tablet Take by mouth 2 times daily    Historical Provider, MD   venlafaxine (EFFEXOR) 37.5 MG tablet Take 37.5 mg by mouth    Historical Provider, MD   midodrine (PROAMATINE) 5 MG tablet Take 5 mg by mouth in the morning and at bedtime    Historical Provider, MD   nadolol (CORGARD) 20 MG tablet Take 20 mg by mouth daily    Historical Provider, MD   potassium chloride (KLOR-CON M) 20 MEQ TBCR extended release tablet Take 20 mEq by mouth daily (with breakfast)    Historical Provider, MD   budesonide-formoterol (SYMBICORT) 160-4.5 MCG/ACT AERO INHALE 1 PUFF BY MOUTH TWICE DAILY 4/12/22   Noel Jin MD   busPIRone (BUSPAR) 10 MG tablet Take 1 tablet by mouth twice daily 1/13/22   Noel Jin MD   torsemide (DEMADEX) 20 MG tablet Take 0.5 tablets by mouth daily 10/12/21   Noel Jin MD   albuterol sulfate  (90 Base) MCG/ACT inhaler INHALE 2 PUFFS BY MOUTH EVERY 6 HOURS AS NEEDED FOR WHEEZING 7/1/21   Noel Jin MD   dapagliflozin (FARXIGA) 5 MG tablet Take 5 mg by mouth every morning    Historical Provider, MD   ferrous sulfate (IRON 325) 325 (65 Fe) MG tablet Take 1 tab with breakfast  6/10/21   Cassidy Coker MD   ipratropium-albuterol (DUONEB) 0.5-2.5 (3) MG/3ML SOLN nebulizer solution Inhale 3 mLs into the lungs every 4 hours 3/16/20   Abdoulaye Silverio MD   Cholecalciferol (VITAMIN D3) 50 MCG (2000 UT) CAPS Take 1 capsule by mouth daily    Historical Provider, MD   Coenzyme Q10 (COQ10) 100 MG CAPS Take 1 capsule by mouth daily    Historical Provider, MD   MYRBETRIQ 50 MG TB24 Take 50 mg by mouth daily  7/31/19   Historical Provider, MD   vitamin B-12 (CYANOCOBALAMIN) 1000 MCG tablet Take 1 tablet by mouth daily 10/31/17   Abdoulaye Silverio MD       Allergies:  Lisinopril, Penicillins, Sulfa antibiotics, and Tetanus toxoids    Social History:  The patient currently lives at home with wife. TOBACCO:   reports that he quit smoking about 20 years ago. His smoking use included cigarettes. He has a 80.00 pack-year smoking history. His smokeless tobacco use includes snuff. ETOH:   reports current alcohol use of about 10.0 - 12.0 standard drinks of alcohol per week. Family History:  Reviewed in detail and negative for DM, Early CAD, Cancer, CVA. Positive as follows:        Problem Relation Age of Onset    Cancer Father         skin    Cancer Paternal Cousin         melanoma-thigh       REVIEW OF SYSTEMS:   As noted in the HPI. All other systems reviewed and negative. PHYSICAL EXAM:    BP (!) 94/54   Pulse 69   Temp (!) 96.4 °F (35.8 °C) (Axillary)   Resp 16   Ht 6' (1.829 m)   Wt 219 lb 6.4 oz (99.5 kg)   SpO2 98%   BMI 29.76 kg/m²     General appearance: No apparent distress appears stated age and cooperative. HEENT Normal cephalic, atraumatic without obvious deformity. Pupils equal, round, and reactive to light. Extra ocular muscles intact. Conjunctivae/corneas clear. Neck: Supple, No jugular venous distention/bruits.   Trachea midline without thyromegaly or adenopathy with full range of motion. Lungs: Clear to auscultation, bilaterally without Rales/Wheezes/Rhonchi with good respiratory effort. Heart: Regular rate and rhythm with Normal S1/S2 without murmurs, rubs or gallops, point of maximum impulse non-displaced  Abdomen: Soft, mild distention, + fluid wave. Extremities: 1+ pitting edema. Skin: Skin color, texture, turgor normal.  No rashes or lesions. Neurologic: Alert and oriented X 2, neurovascularly intact with sensory/motor intact upper extremities/lower extremities, bilaterally. Cranial nerves: II-XII intact, grossly non-focal.  Mental status: Alert, partially oriented, thought content mostly appropriate. Capillary Refill: Acceptable  < 3 seconds  Peripheral Pulses: +3 Easily felt, not easily obliterated with pressure      CBC   Recent Labs     06/24/22  1147 06/25/22  0031   WBC 8.5 5.2   HGB 7.2* 7.1*   HCT 23.1* 22.3*   * 109*      RENAL  Recent Labs     06/24/22  1147 06/25/22  0031   * 134*   K 3.2* 3.1*   CL 92* 92*   CO2 28 30   BUN 31* 31*   CREATININE 2.2* 2.5*     LFT'S  Recent Labs     06/24/22  1147 06/25/22  0031   * 108*   ALT 33 29   BILITOT 4.5* 3.5*   ALKPHOS 138* 120     COAG  Recent Labs     06/24/22  1147 06/25/22  0031   INR 1.71* 2.32*     CARDIAC ENZYMES  No results for input(s): CKTOTAL, CKMB, CKMBINDEX, TROPONINI in the last 72 hours.     U/A:    Lab Results   Component Value Date    NITRITE neg 01/13/2016    COLORU YELLOW 06/24/2022    WBCUA 0-2 11/20/2017    RBCUA 20-50 11/20/2017    MUCUS NEGATIVE 06/24/2022    BACTERIA RARE 06/24/2022    CLARITYU CLEAR 06/24/2022    SPECGRAV 1.020 06/24/2022    LEUKOCYTESUR NEGATIVE 06/24/2022    LEUKOCYTESUR 0-5 06/24/2022    BLOODU Negative 02/22/2018    GLUCOSEU 100 06/24/2022    AMORPHOUS NEGATIVE 06/24/2022       ABG  No results found for: WOQ9VWR, BEART, I2LJTKWX, PHART, THGBART, SZY4RPP, PO2ART, Rashawn Campersingel 50 Problems    Diagnosis Date Noted    Cirrhosis ([facility] [J67.26] 06/24/2022     Priority: Medium         PHYSICIANS CERTIFICATION:    I certify that Alise Juárez is expected to be hospitalized for more than 2 midnights based on the following assessment and plan:      ASSESSMENT/PLAN:      Acute encephalopathy - suspect metabolic  Possible Hepatic Encephalopathy. Start lactulose - not on this per his wife and actually has been taking imodium though no diarrhea. No BM in the past 2-3 days. Ammonia elevated. Possible Decompensated Alcoholic Liver Cirrhosis. GI consult. Paracentesis planned. Eliquis held (last dose Thursday per wife's report). Discussed with IR. - will check US abdomen - confirm presence of ascites. - will need FFP in the perioprocedure period due to eliquis and cirrhosis coagulopathy   - no more then 4L to be taken per nephrology    - will need albumin for paracentesis. Hypotension due to advanced liver disease  Midodrine. Albumin. Gentle IVF per nephrology. SEAN - can not rule out hepatorenal process. Nephrology consult. Med changes as above. K and Mg supplement. Daily Labs. DVT Prophylaxis: SCD, eliquis on hold for paracentesis. Diet: Diet NPO Exceptions are: Ice Chips, Sips of Water with Meds  Code Status: full code - discussed with wife - she wants to talk to her sister. She thinks he would not want to be on life support. Though no code status changes made today. Dispo - inpatient. Robinson Beverly MD    Thank you Consuelo Castelan MD for the opportunity to be involved in this patient's care. If you have any questions or concerns please feel free to contact me at [phone].

## 2022-06-25 NOTE — PROGRESS NOTES
Transfer shift report given to [facility]. Pt in stable condition, care transferred at this time.  Electronically signed by Brooke Sinha RN on 6/25/2022 at 12:08 AM

## 2022-06-26 ENCOUNTER — APPOINTMENT (OUTPATIENT)
Dept: ULTRASOUND IMAGING | Age: 72
DRG: 433 | End: 2022-06-26
Attending: INTERNAL MEDICINE
Payer: MEDICARE

## 2022-06-26 LAB
ABO/RH: NORMAL
ALBUMIN SERPL-MCNC: 3 G/DL (ref 3.4–5)
ANION GAP SERPL CALCULATED.3IONS-SCNC: 12 MMOL/L (ref 3–16)
ANTIBODY SCREEN: NORMAL
BLOOD BANK DISPENSE STATUS: NORMAL
BLOOD BANK DISPENSE STATUS: NORMAL
BLOOD BANK PRODUCT CODE: NORMAL
BLOOD BANK PRODUCT CODE: NORMAL
BPU ID: NORMAL
BPU ID: NORMAL
BUN BLDV-MCNC: 26 MG/DL (ref 7–20)
CALCIUM SERPL-MCNC: 8.6 MG/DL (ref 8.3–10.6)
CHLORIDE BLD-SCNC: 95 MMOL/L (ref 99–110)
CO2: 27 MMOL/L (ref 21–32)
CREAT SERPL-MCNC: 1.7 MG/DL (ref 0.8–1.3)
DESCRIPTION BLOOD BANK: NORMAL
DESCRIPTION BLOOD BANK: NORMAL
F-ACTIN AB IGG: 32 UNITS (ref 0–19)
GFR AFRICAN AMERICAN: 48
GFR NON-AFRICAN AMERICAN: 40
GLUCOSE BLD-MCNC: 89 MG/DL (ref 70–99)
GLUCOSE BLD-MCNC: 91 MG/DL (ref 70–99)
HCT VFR BLD CALC: 19.5 % (ref 40.5–52.5)
HCT VFR BLD CALC: 21.7 % (ref 40.5–52.5)
HCT VFR BLD CALC: 23.7 % (ref 40.5–52.5)
HEMOGLOBIN: 6.3 G/DL (ref 13.5–17.5)
HEMOGLOBIN: 6.9 G/DL (ref 13.5–17.5)
HEMOGLOBIN: 7.7 G/DL (ref 13.5–17.5)
INR BLD: 1.76 (ref 0.87–1.14)
MAGNESIUM: 1.8 MG/DL (ref 1.8–2.4)
MCH RBC QN AUTO: 28.2 PG (ref 26–34)
MCHC RBC AUTO-ENTMCNC: 32.2 G/DL (ref 31–36)
MCV RBC AUTO: 87.6 FL (ref 80–100)
OCCULT BLOOD DIAGNOSTIC: ABNORMAL
PDW BLD-RTO: 20.1 % (ref 12.4–15.4)
PERFORMED ON: NORMAL
PHOSPHORUS: 3.4 MG/DL (ref 2.5–4.9)
PLATELET # BLD: 99 K/UL (ref 135–450)
PMV BLD AUTO: 7.4 FL (ref 5–10.5)
POTASSIUM SERPL-SCNC: 3.3 MMOL/L (ref 3.5–5.1)
PROTHROMBIN TIME: 20.3 SEC (ref 11.7–14.5)
RBC # BLD: 2.23 M/UL (ref 4.2–5.9)
SMOOTH MUSCLE AB IGG TITER: ABNORMAL
SODIUM BLD-SCNC: 134 MMOL/L (ref 136–145)
WBC # BLD: 3.7 K/UL (ref 4–11)

## 2022-06-26 PROCEDURE — 6370000000 HC RX 637 (ALT 250 FOR IP): Performed by: INTERNAL MEDICINE

## 2022-06-26 PROCEDURE — P9016 RBC LEUKOCYTES REDUCED: HCPCS

## 2022-06-26 PROCEDURE — 6360000002 HC RX W HCPCS: Performed by: INTERNAL MEDICINE

## 2022-06-26 PROCEDURE — 80069 RENAL FUNCTION PANEL: CPT

## 2022-06-26 PROCEDURE — 36415 COLL VENOUS BLD VENIPUNCTURE: CPT

## 2022-06-26 PROCEDURE — 2580000003 HC RX 258: Performed by: INTERNAL MEDICINE

## 2022-06-26 PROCEDURE — 82270 OCCULT BLOOD FECES: CPT

## 2022-06-26 PROCEDURE — 86900 BLOOD TYPING SEROLOGIC ABO: CPT

## 2022-06-26 PROCEDURE — 85610 PROTHROMBIN TIME: CPT

## 2022-06-26 PROCEDURE — 83735 ASSAY OF MAGNESIUM: CPT

## 2022-06-26 PROCEDURE — 2060000000 HC ICU INTERMEDIATE R&B

## 2022-06-26 PROCEDURE — 86923 COMPATIBILITY TEST ELECTRIC: CPT

## 2022-06-26 PROCEDURE — 36430 TRANSFUSION BLD/BLD COMPNT: CPT

## 2022-06-26 PROCEDURE — 0W9G3ZZ DRAINAGE OF PERITONEAL CAVITY, PERCUTANEOUS APPROACH: ICD-10-PCS | Performed by: RADIOLOGY

## 2022-06-26 PROCEDURE — 99233 SBSQ HOSP IP/OBS HIGH 50: CPT | Performed by: INTERNAL MEDICINE

## 2022-06-26 PROCEDURE — P9047 ALBUMIN (HUMAN), 25%, 50ML: HCPCS | Performed by: INTERNAL MEDICINE

## 2022-06-26 PROCEDURE — 94640 AIRWAY INHALATION TREATMENT: CPT

## 2022-06-26 PROCEDURE — 86850 RBC ANTIBODY SCREEN: CPT

## 2022-06-26 PROCEDURE — 49083 ABD PARACENTESIS W/IMAGING: CPT

## 2022-06-26 PROCEDURE — 85027 COMPLETE CBC AUTOMATED: CPT

## 2022-06-26 PROCEDURE — 85018 HEMOGLOBIN: CPT

## 2022-06-26 PROCEDURE — C9113 INJ PANTOPRAZOLE SODIUM, VIA: HCPCS | Performed by: INTERNAL MEDICINE

## 2022-06-26 PROCEDURE — 86901 BLOOD TYPING SEROLOGIC RH(D): CPT

## 2022-06-26 PROCEDURE — 85014 HEMATOCRIT: CPT

## 2022-06-26 RX ORDER — POTASSIUM CHLORIDE 20 MEQ/1
40 TABLET, EXTENDED RELEASE ORAL ONCE
Status: COMPLETED | OUTPATIENT
Start: 2022-06-26 | End: 2022-06-26

## 2022-06-26 RX ORDER — SODIUM CHLORIDE 9 MG/ML
250 INJECTION, SOLUTION INTRAVENOUS PRN
Status: DISCONTINUED | OUTPATIENT
Start: 2022-06-26 | End: 2022-06-27

## 2022-06-26 RX ORDER — PANTOPRAZOLE SODIUM 40 MG/10ML
40 INJECTION, POWDER, LYOPHILIZED, FOR SOLUTION INTRAVENOUS DAILY
Status: DISCONTINUED | OUTPATIENT
Start: 2022-06-26 | End: 2022-06-27

## 2022-06-26 RX ORDER — LACTULOSE 10 G/15ML
20 SOLUTION ORAL EVERY 8 HOURS SCHEDULED
Status: DISCONTINUED | OUTPATIENT
Start: 2022-06-26 | End: 2022-06-27

## 2022-06-26 RX ORDER — OCTREOTIDE ACETATE 100 UG/ML
50 INJECTION, SOLUTION INTRAVENOUS; SUBCUTANEOUS ONCE
Status: COMPLETED | OUTPATIENT
Start: 2022-06-26 | End: 2022-06-26

## 2022-06-26 RX ORDER — SODIUM CHLORIDE 9 MG/ML
INJECTION, SOLUTION INTRAVENOUS PRN
Status: COMPLETED | OUTPATIENT
Start: 2022-06-26 | End: 2022-06-26

## 2022-06-26 RX ADMIN — ALBUMIN (HUMAN) 25 G: 0.25 INJECTION, SOLUTION INTRAVENOUS at 06:20

## 2022-06-26 RX ADMIN — IPRATROPIUM BROMIDE AND ALBUTEROL SULFATE 3 ML: 2.5; .5 SOLUTION RESPIRATORY (INHALATION) at 07:53

## 2022-06-26 RX ADMIN — LACTULOSE 20 G: 10 SOLUTION ORAL at 21:06

## 2022-06-26 RX ADMIN — MIDODRINE HYDROCHLORIDE 10 MG: 10 TABLET ORAL at 09:45

## 2022-06-26 RX ADMIN — LACTULOSE 20 G: 10 SOLUTION ORAL at 04:47

## 2022-06-26 RX ADMIN — MIDODRINE HYDROCHLORIDE 10 MG: 10 TABLET ORAL at 16:38

## 2022-06-26 RX ADMIN — SODIUM CHLORIDE: 9 INJECTION, SOLUTION INTRAVENOUS at 04:48

## 2022-06-26 RX ADMIN — OCTREOTIDE ACETATE 50 MCG: 100 INJECTION, SOLUTION INTRAVENOUS; SUBCUTANEOUS at 09:39

## 2022-06-26 RX ADMIN — PANTOPRAZOLE SODIUM 40 MG: 40 INJECTION, POWDER, FOR SOLUTION INTRAVENOUS at 09:55

## 2022-06-26 RX ADMIN — Medication 1 PUFF: at 20:10

## 2022-06-26 RX ADMIN — PANTOPRAZOLE SODIUM 40 MG: 40 TABLET, DELAYED RELEASE ORAL at 06:16

## 2022-06-26 RX ADMIN — LACTULOSE 20 G: 10 SOLUTION ORAL at 00:33

## 2022-06-26 RX ADMIN — MIDODRINE HYDROCHLORIDE 10 MG: 10 TABLET ORAL at 12:06

## 2022-06-26 RX ADMIN — LACTULOSE 20 G: 10 SOLUTION ORAL at 16:10

## 2022-06-26 RX ADMIN — POTASSIUM CHLORIDE 40 MEQ: 1500 TABLET, EXTENDED RELEASE ORAL at 12:06

## 2022-06-26 RX ADMIN — POTASSIUM CHLORIDE 20 MEQ: 1500 TABLET, EXTENDED RELEASE ORAL at 09:55

## 2022-06-26 RX ADMIN — IPRATROPIUM BROMIDE AND ALBUTEROL SULFATE 3 ML: 2.5; .5 SOLUTION RESPIRATORY (INHALATION) at 20:06

## 2022-06-26 RX ADMIN — Medication 10 ML: at 21:06

## 2022-06-26 RX ADMIN — CYANOCOBALAMIN TAB 500 MCG 1000 MCG: 500 TAB at 09:45

## 2022-06-26 RX ADMIN — Medication 10 ML: at 09:55

## 2022-06-26 RX ADMIN — SODIUM CHLORIDE: 9 INJECTION, SOLUTION INTRAVENOUS at 12:17

## 2022-06-26 RX ADMIN — Medication 1 CAPSULE: at 09:45

## 2022-06-26 RX ADMIN — OCTREOTIDE ACETATE 50 MCG/HR: 500 INJECTION, SOLUTION INTRAVENOUS; SUBCUTANEOUS at 18:02

## 2022-06-26 RX ADMIN — OCTREOTIDE ACETATE 50 MCG/HR: 500 INJECTION, SOLUTION INTRAVENOUS; SUBCUTANEOUS at 09:43

## 2022-06-26 RX ADMIN — Medication 2000 UNITS: at 09:45

## 2022-06-26 RX ADMIN — ALLOPURINOL 100 MG: 100 TABLET ORAL at 09:45

## 2022-06-26 RX ADMIN — ALBUMIN (HUMAN) 25 G: 0.25 INJECTION, SOLUTION INTRAVENOUS at 00:46

## 2022-06-26 RX ADMIN — NADOLOL 20 MG: 40 TABLET ORAL at 09:46

## 2022-06-26 ASSESSMENT — PAIN SCALES - GENERAL
PAINLEVEL_OUTOF10: 0

## 2022-06-26 NOTE — PROGRESS NOTES
Hospitalist Progress Note      PCP: Myla Zeng MD    Date of Admission: 6/24/2022    Chief Complaint: hepatic encephalopathy, fall at home. Subjective:     More awake and alert. Denies new complaint today. Plenty of BM since lactulose started. Hgb dropped to 6.3 - pRBC ordered. FOBT positive. Medications:  Reviewed    Infusion Medications    octreotide (SandoSTATIN) infusion 50 mcg/hr (06/26/22 1334)    sodium chloride      sodium chloride       Scheduled Medications    lactulose  20 g Oral 3 times per day    pantoprazole  40 mg IntraVENous Daily    Vitamin D  2,000 Units Oral Daily    nadolol  20 mg Oral Daily    lactobacillus  1 capsule Oral Daily    potassium chloride  20 mEq Oral Daily with breakfast    vitamin B-12  1,000 mcg Oral Daily    sodium chloride flush  5-40 mL IntraVENous 2 times per day    budesonide-formoterol  1 puff Inhalation BID    ipratropium-albuterol  1 vial Inhalation BID    midodrine  10 mg Oral TID WC    allopurinol  100 mg Oral Daily     PRN Meds: sodium chloride, sodium chloride flush, sodium chloride, ondansetron **OR** ondansetron, polyethylene glycol, acetaminophen **OR** acetaminophen, potassium chloride **OR** potassium alternative oral replacement **OR** potassium chloride, magnesium sulfate, albuterol sulfate HFA      Intake/Output Summary (Last 24 hours) at 6/26/2022 1623  Last data filed at 6/26/2022 1335  Gross per 24 hour   Intake 2625.23 ml   Output 420 ml   Net 2205.23 ml       Physical Exam Performed:    BP (!) 92/55   Pulse 63   Temp 97.1 °F (36.2 °C) (Oral)   Resp 18   Ht 6' (1.829 m)   Wt 222 lb 9.6 oz (101 kg)   SpO2 95%   BMI 30.19 kg/m²     General appearance: No apparent distress, appears stated age and cooperative. HEENT: Pupils equal, round, and reactive to light. Conjunctivae/corneas clear. Neck: Supple, with full range of motion. No jugular venous distention. Trachea midline.   Respiratory:  Normal respiratory effort. Clear to auscultation, bilaterally without Rales/Wheezes/Rhonchi. Cardiovascular: Regular rate and rhythm with normal S1/S2 without murmurs, rubs or gallops. Abdomen: Soft, non-tender, non-distended with normal bowel sounds. Musculoskeletal: No clubbing, cyanosis or edema bilaterally. Full range of motion without deformity. Skin: Skin color, texture, turgor normal.  No rashes or lesions. Neurologic:  Neurovascularly intact without any focal sensory/motor deficits. Cranial nerves: II-XII intact, grossly non-focal.  Psychiatric: Alert and oriented, thought content appropriate, normal insight  Capillary Refill: Brisk,3 seconds, normal   Peripheral Pulses: +2 palpable, equal bilaterally       Labs:   Recent Labs     06/24/22 1147 06/24/22 1147 06/25/22 0031 06/26/22  0433 06/26/22  1344   WBC 8.5  --  5.2 3.7*  --    HGB 7.2*   < > 7.1* 6.3* 6.9*   HCT 23.1*   < > 22.3* 19.5* 21.7*   *  --  109* 99*  --     < > = values in this interval not displayed. Recent Labs     06/24/22  1147 06/25/22 0031 06/26/22  0433   * 134* 134*   K 3.2* 3.1* 3.3*   CL 92* 92* 95*   CO2 28 30 27   BUN 31* 31* 26*   CREATININE 2.2* 2.5* 1.7*   CALCIUM 8.0* 8.3 8.6   PHOS  --   --  3.4     Recent Labs     06/24/22  1147 06/25/22  0031   * 108*   ALT 33 29   BILITOT 4.5* 3.5*   ALKPHOS 138* 120     Recent Labs     06/24/22  1147 06/25/22  0031 06/26/22  0804   INR 1.71* 2.32* 1.76*     No results for input(s): Adonica Hoose in the last 72 hours. Urinalysis:      Lab Results   Component Value Date    NITRU Negative 02/22/2018    WBCUA 0-2 11/20/2017    BACTERIA RARE 06/24/2022    RBCUA 20-50 11/20/2017    BLOODU Negative 02/22/2018    SPECGRAV 1.020 06/24/2022    GLUCOSEU 100 06/24/2022       Radiology:  US GUIDED PARACENTESIS   Final Result   Successful paracentesis. US LIVER   Final Result   1. Small to moderate ascites potentially due to portal hypertension.    2. Limited visualization of the liver with cirrhotic changes. Assessment/Plan:    Active Hospital Problems    Diagnosis     Hepatic encephalopathy ([facility] [K72.90]      Priority: Medium    Cirrhosis ([facility] [K74.60]      Priority: Medium        Acute encephalopathy - suspect metabolic  Probable Hepatic Encephalopathy. Ammonia elevated. Started lactulose - +BM x3 and mental status improved.          Decompensated Alcoholic Liver Cirrhosis. Per recent biopsy results stage 4-4 disease at baseline. Continued alcohol intake. GI consult. Paracentesis planned. Eliquis held (last dose Thursday per wife's report). Discussed with IR for paracentesis over the weekend.          Anemia of acute to subacute blood loss. Started Octreotide with hx of esophageal varices. PPI IV  GI involved. FOBT positive. Hgb 6.3 - pRBC 6/26 - repeat Hgb post transfusion 6.9 inappropriate response - additional pRBC ordered.           Hypotension due to advanced liver disease  Midodrine. Albumin. Gentle IVF per nephrology.           SEAN - can not rule out hepatorenal process. Nephrology consult. Med changes as above.           K and Mg supplement. Daily Labs.            DVT Prophylaxis: SCD, eliquis on hold for paracentesis and also with concerns for ABLA.          Diet: Diet NPO Exceptions are: Ice Chips, Sips of Water with Meds  Code Status: full code - discussed with wife - she wants to talk to her sister. She thinks he would not want to be on life support.  Though no code status changes made today.        Dispo - inpatient.        Nell March MD

## 2022-06-26 NOTE — PROGRESS NOTES
Shift assessment complete. Call light and bedside table within reach.  Electronically signed by Bunny Baker RN on 6/25/2022 at 8:45 PM

## 2022-06-26 NOTE — PROGRESS NOTES
Transfer shift report given to [facility]. Pt in stable condition, care transferred at this time.  Electronically signed by Luciana Cespedes RN on 6/25/2022 at 11:18 PM

## 2022-06-26 NOTE — FLOWSHEET NOTE
06/26/22 0054   Vital Signs   Temp (!) 96.7 °F (35.9 °C)   Temp Source Oral   Heart Rate 73   Heart Rate Source Monitor   Resp 18   BP (!) 110/54   BP Location Right upper arm   MAP (Calculated) 72.67   Level of Consciousness Alert (0)   MEWS Score 1   Oxygen Therapy   SpO2 97 %   Pulse Oximeter Device Mode Intermittent   Pulse Oximeter Device Location Finger   O2 Device None (Room air)   Height and Weight   Weight 222 lb 9.6 oz (101 kg)   Weight Method Bed scale   BMI (Calculated) 30.3     Pt awake in bed. VS as shown above. Temp Rechecked and was 97.0 Pt denies any further needs at this time. Call light in reach and bed in lowest position.

## 2022-06-26 NOTE — PROGRESS NOTES
[facility]  Nephrology follow-up note           Reason for Consult: SEAN  Requesting Physician:  Dr. Darren Villa history  Patient is more awake alert today  Feels better than yesterday  Renal function has improved  Blood pressure in the 100-110s    Last 24 h uop 200 mL charted    ROS: No chest pain/shortness of breath/fever/nausea/vomiting  PSFH: + visitor    Scheduled Meds:   lactulose  20 g Oral 3 times per day    pantoprazole  40 mg IntraVENous Daily    Vitamin D  2,000 Units Oral Daily    nadolol  20 mg Oral Daily    lactobacillus  1 capsule Oral Daily    potassium chloride  20 mEq Oral Daily with breakfast    vitamin B-12  1,000 mcg Oral Daily    sodium chloride flush  5-40 mL IntraVENous 2 times per day    budesonide-formoterol  1 puff Inhalation BID    ipratropium-albuterol  1 vial Inhalation BID    midodrine  10 mg Oral TID WC    allopurinol  100 mg Oral Daily     Continuous Infusions:   sodium chloride      octreotide (SandoSTATIN) infusion 50 mcg/hr (06/26/22 0943)    sodium chloride      sodium chloride Stopped (06/26/22 0904)     PRN Meds:.sodium chloride, sodium chloride flush, sodium chloride, ondansetron **OR** ondansetron, polyethylene glycol, acetaminophen **OR** acetaminophen, potassium chloride **OR** potassium alternative oral replacement **OR** potassium chloride, magnesium sulfate, albuterol sulfate HFA    History of Present Illness on 6/25/2022:    67 y.o. yo male with PMH of CAD status post CABG, COPD, hypertension, LINSEY, alcoholic cirrhosis who is admitted for SEAN, increasing ascites and lethargy  Patient fell at home on Thursday and needed 2 person to get him up; will continue to be weak and not able to do his usual activities and started getting more lethargic as well. It is noted by the wife that his abdomen was getting bigger as well.   Patient was recently seen in GI clinic and was maintained on diuretics  As the symptoms persisted, patient was taken to outside hospital ER and has been transferred to [facility] last night  Creatinine has increased to 2.5 and nephrology has been consulted  Upon chart review, baseline creatinine seems to be around the low ones; creatinine was 1.5 on 6/22    Physical exam:   Constitutional:  VITALS:  BP (!) 109/54   Pulse 77   Temp 97.9 °F (36.6 °C) (Axillary)   Resp 20   Ht 6' (1.829 m)   Wt 222 lb 9.6 oz (101 kg)   SpO2 95%   BMI 30.19 kg/m²   Gen: Arousable, but very sleepy  Neck: No JVD  Skin: Jaundiced  Cardiovascular:  S1, S2 without m/r/g   Respiratory: Creased breath sounds at the bases  Abdomen:  soft, nt, nd,   Extremities: 1+ lower extremity edema  Neuro/Psy: AAoriented times 2-3 ; moves all 4 ext    Data/  Recent Labs     06/24/22  1147 06/25/22  0031 06/26/22  0433   WBC 8.5 5.2 3.7*   HGB 7.2* 7.1* 6.3*   HCT 23.1* 22.3* 19.5*   MCV 89.9 89.4 87.6   * 109* 99*     Recent Labs     06/24/22  1147 06/25/22  0031 06/26/22  0433   * 134* 134*   K 3.2* 3.1* 3.3*   CL 92* 92* 95*   CO2 28 30 27   GLUCOSE  --  79 89   PHOS  --   --  3.4   MG  --  1.40*  1.40* 1.80   BUN 31* 31* 26*   CREATININE 2.2* 2.5* 1.7*   LABGLOM 30 26* 40*   GFRAA  --  31* 48*     Chest x-ray clear  UA bland  Urine sodium less than 20, creatinine 143    Assessment  -SEAN in the setting of liver cirrhosis, hypotension, anemia, diuretic use with bland UA. Most likely is prerenal in etiology.   Not likely to be HRS    -Hypokalemia    -Hypomagnesemia    -Hypotension    -Anemia and thrombocytopenia   Platelet count likely related to cirrhosis of liver   Defer to GI   Eliquis on hold   1 PRBC on 3/53    -Alcoholic liver cirrhosis with hepatic encephalopathy and ascites    Plan  -At paracentesis  please do not remove more than 4 L with SAEN ongoing  -Replace potassium 40 M EQ x1 now, continue potassium chloride 20 M EQ daily  -Midodrine 10 mg 3 times daily  -Stop normal saline  -Hold Farxiga, diuretics and antihypertensives  -Hold nadolol for systolic blood pressure less than 110  - allopurinol to 100 mg daily [decreased on 6/25]  -Serial renal panel  -daily wts and strict i/o  -renal dose medications   -avoid nephrotoxins      Thank you for the consultation. Please do not hesitate to call with questions. River Saldivar MD  Office: [phone]  Fax:    [phone] [address] [facility]

## 2022-06-26 NOTE — ACP (ADVANCE CARE PLANNING)
Advance Care Planning     Advance Care Planning Inpatient Note  [facility] Department    Today's Date: 6/26/2022  Unit: [facility] PCU TELEMETRY    Received request from patient and family. Upon review of chart and communication with care team, patient's decision making abilities are not in question. . Patient, Healthcare Decision Maker and Spouse was/were present in the room during visit. Goals of ACP Conversation:  Discuss advance care planning documents    Health Care Decision Makers:       Primary Decision Maker: [address] [place] [address] [phone]    Secondary Decision Maker: Patsy Libman - Other - [phone]    Supplemental (Other) Decision Maker: Johnnie Bradley - Child - [phone]    Summary:  Completed New Documents  Updated Healthcare Decision [facility]    Advance Care Planning Documents (Patient Wishes):  Healthcare Power of /Advance Directive Appointment of Health Care Agent  Living Will/Advance Directive     Assessment:  Pt and family wanting to update order of Pt's healthcare decision makers. Interventions:  Assisted in the completion of documents according to patient's wishes at this time    Care Preferences Communicated:   No    Outcomes/Plan:  New advance directive completed. Returned original document(s) to patient, as well as copies for distribution to appointed agents  Copy of advance directive given to staff to scan into medical record.     Electronically signed by Madisyn Chavez on 6/26/2022 at 3:30 PM

## 2022-06-26 NOTE — PROGRESS NOTES
PROGRESS NOTE  S:72 yrs Patient  admitted on 6/24/2022 with Cirrhosis ([facility] [address] [K74.60] . More alert today. Feels better after paracentesis - 1.7 liter removed. Having a lot of bms with lactulose. Hgb decreased. Wanting EGD    Current Hospital Schedued Meds   lactulose  20 g Oral 3 times per day    pantoprazole  40 mg IntraVENous Daily    Vitamin D  2,000 Units Oral Daily    nadolol  20 mg Oral Daily    lactobacillus  1 capsule Oral Daily    potassium chloride  20 mEq Oral Daily with breakfast    vitamin B-12  1,000 mcg Oral Daily    sodium chloride flush  5-40 mL IntraVENous 2 times per day    budesonide-formoterol  1 puff Inhalation BID    ipratropium-albuterol  1 vial Inhalation BID    midodrine  10 mg Oral TID WC    allopurinol  100 mg Oral Daily     Current Hospital IV Meds   octreotide (SandoSTATIN) infusion 50 mcg/hr (06/26/22 1630)    sodium chloride      sodium chloride       Current Hospital PRN Meds  sodium chloride, sodium chloride flush, sodium chloride, ondansetron **OR** ondansetron, polyethylene glycol, acetaminophen **OR** acetaminophen, potassium chloride **OR** potassium alternative oral replacement **OR** potassium chloride, magnesium sulfate, albuterol sulfate HFA    Exam:   Vitals:    06/26/22 1636   BP: (!) 97/52   Pulse: 65   Resp: 18   Temp: 97.7 °F (36.5 °C)   SpO2: 95%     I/O last 3 completed shifts:   In: 0   Out: 200 [Urine:200]   General appearance: alert, appears stated age and cooperative  HEENT: PERRLA  Neck: no adenopathy, no carotid bruit, no JVD, supple, symmetrical, trachea midline and thyroid not enlarged, symmetric, no tenderness/mass/nodules  Lungs: clear to auscultation bilaterally  Heart: regular rate and rhythm, S1, S2 normal, no murmur, click, rub or gallop  Abdomen: soft, non-tender; bowel sounds normal; no masses,  no organomegaly  Extremities: extremities normal, atraumatic, no cyanosis or edema     Labs:  CBC: Recent Labs     06/24/22  1147 06/24/22  1147 06/25/22  0031 06/26/22  0433 06/26/22  1344   WBC 8.5  --  5.2 3.7*  --    HGB 7.2*   < > 7.1* 6.3* 6.9*   HCT 23.1*   < > 22.3* 19.5* 21.7*   MCV 89.9  --  89.4 87.6  --    *  --  109* 99*  --     < > = values in this interval not displayed. BMP:   Recent Labs     06/24/22  1147 06/25/22  0031 06/26/22  0433   * 134* 134*   K 3.2* 3.1* 3.3*   CL 92* 92* 95*   CO2 28 30 27   PHOS  --   --  3.4   BUN 31* 31* 26*   CREATININE 2.2* 2.5* 1.7*     LIVER PROFILE:   Recent Labs     06/24/22  1147 06/25/22  0031   * 108*   ALT 33 29   LIPASE 259  --    PROT 5.1* 4.9*   BILITOT 4.5* 3.5*   ALKPHOS 138* 120     PT/INR:   Recent Labs     06/24/22  1147 06/25/22  0031 06/26/22  0804   INR 1.71* 2.32* 1.76*       IMAGING:  US LIVER    Result Date: 6/25/2022  EXAMINATION: LIMITED ABDOMINAL ULTRASOUND 6/25/2022 12:46 pm COMPARISON: CT-guided liver biopsy 01/17/2022, limited all sonogram 02/15/2019 HISTORY: ORDERING SYSTEM PROVIDED HISTORY: cirrhosis, eval for ascites TECHNOLOGIST PROVIDED HISTORY: Reason for exam:->cirrhosis, eval for ascites FINDINGS: Small to moderate free intraperitoneal fluid. Surface nodularity and coarsened echotexture of the visualized liver with no evident focal lesion. 1. Small to moderate ascites potentially due to portal hypertension. 2. Limited visualization of the liver with cirrhotic changes. US GUIDED PARACENTESIS    Result Date: 6/26/2022  PROCEDURE: PARACENTESIS WITHOUT IMAGE GUIDANCE US ABDOMEN LIMITED 6/26/2022 HISTORY: ORDERING SYSTEM PROVIDED HISTORY: ascites TECHNOLOGIST PROVIDED HISTORY: Reason for exam:->ascites TECHNIQUE: Informed consent was obtained after a detailed explanation of the procedure including risks, benefits, and alternatives. Universal protocol was followed. A limited ultrasound of the abdomen was performed.  The left abdomen was prepped and draped in sterile fashion and local anesthesia was achieved with lidocaine. A 5 Greek needle sheath was advanced into ascites and paracentesis was performed. The patient tolerated the procedure well. Estimated blood loss: Less than 5 cc FINDINGS: Limited ultrasound of the abdomen demonstrates ascites. A total of 1.6 L was removed. Successful paracentesis. Hospital Problems           Last Modified POA    * (Principal) Cirrhosis ([facility] [address] 6/24/2022 Yes    Hepatic encephalopathy ([facility] [address] 6/25/2022 Yes         Impression:  66 yo male with cirrhosis admitted [facility] decompensation    Recommendation:  1. Appreciate paracentesis, awaiting fluid count  2. Plan EGD timing as per Dr. Ramsey Haynes  3. Titrate down lactulose to 3 bms/day  4.  Adjust diuretics for discharge      Odessa Evans DO  4:47 PM 6/26/2022            [facility]    [address] [place] [address] [place]     Phone: [phone]     Fax: [phone]

## 2022-06-26 NOTE — PROGRESS NOTES
Blood transfusion complete pt tolerated well. Lab called for 1 hour post draw H/H. Pt resting comfortably denies any complaints. VSS.

## 2022-06-26 NOTE — PROGRESS NOTES
Lab called and informed his RN that Hgb was 6.3 and Hem was 19.5 MD notified and new orders received.

## 2022-06-26 NOTE — PROGRESS NOTES
RT Inhaler-Nebulizer Bronchodilator Protocol Note    There is a bronchodilator order in the chart from a provider indicating to follow the RT Bronchodilator Protocol and there is an Initiate RT Inhaler-Nebulizer Bronchodilator Protocol order as well (see protocol at bottom of note). CXR Findings:  No results found. The findings from the last RT Protocol Assessment were as follows:   History Pulmonary Disease: (P) Chronic pulmonary disease  Respiratory Pattern: (P) Regular pattern and RR 12-20 bpm  Breath Sounds: (P) Slightly diminished and/or crackles  Cough: (P) Strong, spontaneous, non-productive  Indication for Bronchodilator Therapy: (P) Decreased or absent breath sounds  Bronchodilator Assessment Score: (P) 4    Aerosolized bronchodilator medication orders have been revised according to the RT Inhaler-Nebulizer Bronchodilator Protocol below. Respiratory Therapist to perform RT Therapy Protocol Assessment initially then follow the protocol. Repeat RT Therapy Protocol Assessment PRN for score 0-3 or on second treatment, BID, and PRN for scores above 3. No Indications - adjust the frequency to every 6 hours PRN wheezing or bronchospasm, if no treatments needed after 48 hours then discontinue using Per Protocol order mode. If indication present, adjust the RT bronchodilator orders based on the Bronchodilator Assessment Score as indicated below. Use Inhaler orders unless patient has one or more of the following: on home nebulizer, not able to hold breath for 10 seconds, is not alert and oriented, cannot activate and use MDI correctly, or respiratory rate 25 breaths per minute or more, then use the equivalent nebulizer order(s) with same Frequency and PRN reasons based on the score. If a patient is on this medication at home then do not decrease Frequency below that used at home.     0-3 - enter or revise RT bronchodilator order(s) to equivalent RT Bronchodilator order with Frequency of every 4 hours PRN for wheezing or increased work of breathing using Per Protocol order mode. 4-6 - enter or revise RT Bronchodilator order(s) to two equivalent RT bronchodilator orders with one order with BID Frequency and one order with Frequency of every 4 hours PRN wheezing or increased work of breathing using Per Protocol order mode. 7-10 - enter or revise RT Bronchodilator order(s) to two equivalent RT bronchodilator orders with one order with TID Frequency and one order with Frequency of every 4 hours PRN wheezing or increased work of breathing using Per Protocol order mode. 11-13 - enter or revise RT Bronchodilator order(s) to one equivalent RT bronchodilator order with QID Frequency and an Albuterol order with Frequency of every 4 hours PRN wheezing or increased work of breathing using Per Protocol order mode. Greater than 13 - enter or revise RT Bronchodilator order(s) to one equivalent RT bronchodilator order with every 4 hours Frequency and an Albuterol order with Frequency of every 2 hours PRN wheezing or increased work of breathing using Per Protocol order mode.          Electronically signed by Cici Jesus RCP on 6/26/2022 at 7:57 AM

## 2022-06-26 NOTE — PROGRESS NOTES
Shift assessment completed, see flow sheet. Pt is A/O x4. Pt has visitors at bedside. Pt c/o some SOB denies CP or pain any where else. SpO2 96%. Respirations are easy, even, and unlabored. Bilateral lung sounds diminished. VSS  NSR on the monitor        PIV, WNL with NaCl infuisng 75 ml/hr    All lines and monitoring devices in place. Bed in lowest position with wheels locked. No needs expressed at this time. Will continue to monitor.

## 2022-06-26 NOTE — PROGRESS NOTES
Message sent to Dr. Frazier  repeat hgb 6.9 hct 21.7. Will await new orders.     Per Dr. Frazier  order 1 unit PRBC Electronically signed by Danny Flores RN on 6/26/2022 at 3:33 PM

## 2022-06-26 NOTE — PROGRESS NOTES
RT Inhaler-Nebulizer Bronchodilator Protocol Note    There is a bronchodilator order in the chart from a provider indicating to follow the RT Bronchodilator Protocol and there is an Initiate RT Inhaler-Nebulizer Bronchodilator Protocol order as well (see protocol at bottom of note). CXR Findings:  No results found. The findings from the last RT Protocol Assessment were as follows:   History Pulmonary Disease: Chronic pulmonary disease  Respiratory Pattern: Regular pattern and RR 12-20 bpm  Breath Sounds: Slightly diminished and/or crackles  Cough: Strong, spontaneous, non-productive  Indication for Bronchodilator Therapy: Decreased or absent breath sounds  Bronchodilator Assessment Score: 4    Aerosolized bronchodilator medication orders have been revised according to the RT Inhaler-Nebulizer Bronchodilator Protocol below. Respiratory Therapist to perform RT Therapy Protocol Assessment initially then follow the protocol. Repeat RT Therapy Protocol Assessment PRN for score 0-3 or on second treatment, BID, and PRN for scores above 3. No Indications - adjust the frequency to every 6 hours PRN wheezing or bronchospasm, if no treatments needed after 48 hours then discontinue using Per Protocol order mode. If indication present, adjust the RT bronchodilator orders based on the Bronchodilator Assessment Score as indicated below. Use Inhaler orders unless patient has one or more of the following: on home nebulizer, not able to hold breath for 10 seconds, is not alert and oriented, cannot activate and use MDI correctly, or respiratory rate 25 breaths per minute or more, then use the equivalent nebulizer order(s) with same Frequency and PRN reasons based on the score. If a patient is on this medication at home then do not decrease Frequency below that used at home.     0-3 - enter or revise RT bronchodilator order(s) to equivalent RT Bronchodilator order with Frequency of every 4 hours PRN for wheezing or increased work of breathing using Per Protocol order mode. 4-6 - enter or revise RT Bronchodilator order(s) to two equivalent RT bronchodilator orders with one order with BID Frequency and one order with Frequency of every 4 hours PRN wheezing or increased work of breathing using Per Protocol order mode. 7-10 - enter or revise RT Bronchodilator order(s) to two equivalent RT bronchodilator orders with one order with TID Frequency and one order with Frequency of every 4 hours PRN wheezing or increased work of breathing using Per Protocol order mode. 11-13 - enter or revise RT Bronchodilator order(s) to one equivalent RT bronchodilator order with QID Frequency and an Albuterol order with Frequency of every 4 hours PRN wheezing or increased work of breathing using Per Protocol order mode. Greater than 13 - enter or revise RT Bronchodilator order(s) to one equivalent RT bronchodilator order with every 4 hours Frequency and an Albuterol order with Frequency of every 2 hours PRN wheezing or increased work of breathing using Per Protocol order mode.          Electronically signed by Rajeev Wilder RCP on 6/25/2022 at 8:30 PM

## 2022-06-26 NOTE — BRIEF OP NOTE
Brief Postoperative Note    Fletcher Mesa  YOB: 1950  [id number]    Pre-operative Diagnosis: ascites    Post-operative Diagnosis: Same    Procedure: US-guided paracentesis    Anesthesia: Local    Surgeons: Albert Lunsford MD    Estimated Blood Loss: Less than 5 mL    Complications: None    Specimens: ascites drained    Findings: Successful US-guided paracentesis.     Electronically signed by Albert Lunsford MD on 6/26/2022 at 2:42 PM

## 2022-06-27 LAB
ALBUMIN SERPL-MCNC: 3.2 G/DL (ref 3.4–5)
ANION GAP SERPL CALCULATED.3IONS-SCNC: 10 MMOL/L (ref 3–16)
BASOPHILS ABSOLUTE: 0.1 K/UL (ref 0–0.2)
BASOPHILS RELATIVE PERCENT: 2.1 %
BUN BLDV-MCNC: 20 MG/DL (ref 7–20)
CALCIUM SERPL-MCNC: 8.8 MG/DL (ref 8.3–10.6)
CHLORIDE BLD-SCNC: 94 MMOL/L (ref 99–110)
CO2: 28 MMOL/L (ref 21–32)
CREAT SERPL-MCNC: 1.3 MG/DL (ref 0.8–1.3)
EOSINOPHILS ABSOLUTE: 0.1 K/UL (ref 0–0.6)
EOSINOPHILS RELATIVE PERCENT: 4 %
GFR AFRICAN AMERICAN: >60
GFR NON-AFRICAN AMERICAN: 54
GLUCOSE BLD-MCNC: 100 MG/DL (ref 70–99)
HCT VFR BLD CALC: 24.6 % (ref 40.5–52.5)
HEMOGLOBIN: 7.8 G/DL (ref 13.5–17.5)
INR BLD: 1.53 (ref 0.87–1.14)
LYMPHOCYTES ABSOLUTE: 0.7 K/UL (ref 1–5.1)
LYMPHOCYTES RELATIVE PERCENT: 19.2 %
MAGNESIUM: 1.7 MG/DL (ref 1.8–2.4)
MCH RBC QN AUTO: 28.6 PG (ref 26–34)
MCHC RBC AUTO-ENTMCNC: 31.9 G/DL (ref 31–36)
MCV RBC AUTO: 89.7 FL (ref 80–100)
MONOCYTES ABSOLUTE: 0.8 K/UL (ref 0–1.3)
MONOCYTES RELATIVE PERCENT: 21.3 %
NEUTROPHILS ABSOLUTE: 1.9 K/UL (ref 1.7–7.7)
NEUTROPHILS RELATIVE PERCENT: 53.4 %
PDW BLD-RTO: 18.9 % (ref 12.4–15.4)
PHOSPHORUS: 2.7 MG/DL (ref 2.5–4.9)
PLATELET # BLD: 94 K/UL (ref 135–450)
PMV BLD AUTO: 8 FL (ref 5–10.5)
POTASSIUM SERPL-SCNC: 4.3 MMOL/L (ref 3.5–5.1)
PROTHROMBIN TIME: 18.2 SEC (ref 11.7–14.5)
RBC # BLD: 2.74 M/UL (ref 4.2–5.9)
SODIUM BLD-SCNC: 132 MMOL/L (ref 136–145)
WBC # BLD: 3.6 K/UL (ref 4–11)

## 2022-06-27 PROCEDURE — 2580000003 HC RX 258: Performed by: INTERNAL MEDICINE

## 2022-06-27 PROCEDURE — 97535 SELF CARE MNGMENT TRAINING: CPT

## 2022-06-27 PROCEDURE — 99233 SBSQ HOSP IP/OBS HIGH 50: CPT | Performed by: INTERNAL MEDICINE

## 2022-06-27 PROCEDURE — 85610 PROTHROMBIN TIME: CPT

## 2022-06-27 PROCEDURE — 97530 THERAPEUTIC ACTIVITIES: CPT

## 2022-06-27 PROCEDURE — 97166 OT EVAL MOD COMPLEX 45 MIN: CPT

## 2022-06-27 PROCEDURE — 94640 AIRWAY INHALATION TREATMENT: CPT

## 2022-06-27 PROCEDURE — 80069 RENAL FUNCTION PANEL: CPT

## 2022-06-27 PROCEDURE — 36415 COLL VENOUS BLD VENIPUNCTURE: CPT

## 2022-06-27 PROCEDURE — 6370000000 HC RX 637 (ALT 250 FOR IP): Performed by: PHYSICIAN ASSISTANT

## 2022-06-27 PROCEDURE — 6370000000 HC RX 637 (ALT 250 FOR IP): Performed by: INTERNAL MEDICINE

## 2022-06-27 PROCEDURE — 97162 PT EVAL MOD COMPLEX 30 MIN: CPT

## 2022-06-27 PROCEDURE — 6360000002 HC RX W HCPCS: Performed by: INTERNAL MEDICINE

## 2022-06-27 PROCEDURE — 2060000000 HC ICU INTERMEDIATE R&B

## 2022-06-27 PROCEDURE — 83735 ASSAY OF MAGNESIUM: CPT

## 2022-06-27 PROCEDURE — 94761 N-INVAS EAR/PLS OXIMETRY MLT: CPT

## 2022-06-27 PROCEDURE — 85025 COMPLETE CBC W/AUTO DIFF WBC: CPT

## 2022-06-27 PROCEDURE — C9113 INJ PANTOPRAZOLE SODIUM, VIA: HCPCS | Performed by: INTERNAL MEDICINE

## 2022-06-27 RX ORDER — MAGNESIUM SULFATE IN WATER 40 MG/ML
4000 INJECTION, SOLUTION INTRAVENOUS ONCE
Status: COMPLETED | OUTPATIENT
Start: 2022-06-27 | End: 2022-06-27

## 2022-06-27 RX ORDER — LACTULOSE 10 G/15ML
20 SOLUTION ORAL 2 TIMES DAILY
Status: DISCONTINUED | OUTPATIENT
Start: 2022-06-27 | End: 2022-06-27

## 2022-06-27 RX ORDER — TORSEMIDE 20 MG/1
20 TABLET ORAL DAILY
Status: DISCONTINUED | OUTPATIENT
Start: 2022-06-27 | End: 2022-06-29 | Stop reason: HOSPADM

## 2022-06-27 RX ORDER — PANTOPRAZOLE SODIUM 40 MG/10ML
40 INJECTION, POWDER, LYOPHILIZED, FOR SOLUTION INTRAVENOUS 2 TIMES DAILY
Status: DISCONTINUED | OUTPATIENT
Start: 2022-06-27 | End: 2022-06-29 | Stop reason: HOSPADM

## 2022-06-27 RX ORDER — LACTULOSE 10 G/15ML
10 SOLUTION ORAL 3 TIMES DAILY
Status: DISCONTINUED | OUTPATIENT
Start: 2022-06-27 | End: 2022-06-29 | Stop reason: HOSPADM

## 2022-06-27 RX ADMIN — Medication 1 PUFF: at 07:27

## 2022-06-27 RX ADMIN — ALLOPURINOL 100 MG: 100 TABLET ORAL at 09:01

## 2022-06-27 RX ADMIN — PANTOPRAZOLE SODIUM 40 MG: 40 INJECTION, POWDER, FOR SOLUTION INTRAVENOUS at 20:26

## 2022-06-27 RX ADMIN — LACTULOSE 10 G: 10 SOLUTION ORAL at 15:36

## 2022-06-27 RX ADMIN — Medication 1 PUFF: at 20:32

## 2022-06-27 RX ADMIN — IPRATROPIUM BROMIDE AND ALBUTEROL SULFATE 3 ML: 2.5; .5 SOLUTION RESPIRATORY (INHALATION) at 07:27

## 2022-06-27 RX ADMIN — IPRATROPIUM BROMIDE AND ALBUTEROL SULFATE 3 ML: 2.5; .5 SOLUTION RESPIRATORY (INHALATION) at 20:32

## 2022-06-27 RX ADMIN — MIDODRINE HYDROCHLORIDE 10 MG: 10 TABLET ORAL at 17:12

## 2022-06-27 RX ADMIN — PANTOPRAZOLE SODIUM 40 MG: 40 INJECTION, POWDER, FOR SOLUTION INTRAVENOUS at 08:55

## 2022-06-27 RX ADMIN — Medication 1 CAPSULE: at 09:01

## 2022-06-27 RX ADMIN — MAGNESIUM SULFATE HEPTAHYDRATE 4000 MG: 40 INJECTION, SOLUTION INTRAVENOUS at 08:58

## 2022-06-27 RX ADMIN — NADOLOL 20 MG: 40 TABLET ORAL at 09:01

## 2022-06-27 RX ADMIN — Medication 10 ML: at 20:28

## 2022-06-27 RX ADMIN — LACTULOSE 20 G: 10 SOLUTION ORAL at 06:19

## 2022-06-27 RX ADMIN — MIDODRINE HYDROCHLORIDE 10 MG: 10 TABLET ORAL at 09:00

## 2022-06-27 RX ADMIN — OCTREOTIDE ACETATE 50 MCG/HR: 500 INJECTION, SOLUTION INTRAVENOUS; SUBCUTANEOUS at 04:38

## 2022-06-27 RX ADMIN — CYANOCOBALAMIN TAB 500 MCG 1000 MCG: 500 TAB at 09:01

## 2022-06-27 RX ADMIN — OCTREOTIDE ACETATE 50 MCG/HR: 500 INJECTION, SOLUTION INTRAVENOUS; SUBCUTANEOUS at 15:35

## 2022-06-27 RX ADMIN — LACTULOSE 10 G: 10 SOLUTION ORAL at 20:26

## 2022-06-27 RX ADMIN — TORSEMIDE 20 MG: 20 TABLET ORAL at 11:00

## 2022-06-27 RX ADMIN — MIDODRINE HYDROCHLORIDE 10 MG: 10 TABLET ORAL at 12:32

## 2022-06-27 RX ADMIN — Medication 2000 UNITS: at 09:01

## 2022-06-27 ASSESSMENT — PAIN SCALES - GENERAL: PAINLEVEL_OUTOF10: 0

## 2022-06-27 NOTE — PROGRESS NOTES
Inpatient Physical Therapy Evaluation and Treatment    Unit: PCU  Date:  6/27/2022  Patient Name:    Yolette Sloan  Admitting diagnosis:  Cirrhosis [facility]) [K74.60]  Admit Date:  6/24/2022  Precautions/Restrictions/WB Status/ Lines/ Wounds/ Oxygen: Fall risk, Bed/chair alarm, Lines -IV and Purewick catheter, [place] (hard of hearing) and Telemetry    Treatment Time:  2027-3614  Treatment Number:  1   Timed Code Treatment Minutes: 27 minutes  Total Treatment Minutes:  37  minutes    Patient Goals for Therapy: Pt reports wanting to get out of bed today. Discharge Recommendations: SNF  DME needs for discharge: defer to facility       Therapy recommendation for EMS Transport: can transport by wheelchair    Therapy recommendations for staff:   Assist of 1 with use of rolling walker (RW) and gait belt for all transfers and ambulation to/from [facility]  to/from chair    History of Present Illness:   Per Dr. Tamara Moreno:  This 67 y.o. male was admitted 6/24/2022 with a diagnosis of \"Cirrhosis ([facility] [address] [K74.60]\" and is seen in consultation regarding decompensation     66-year-old male patient of Dr. Ramirez Davenport with hypertension hyperlipidemia coronary artery disease status post CABG COPD congestive heart failure alcohol abuse and cirrhosis who presents with worsening ascites. He saw Soha Dillard arrives earlier this week. There was consideration for cardiology referral and paracentesis. The patient had lower extremity edema. He is on outpatient diuretics. Has some confusion from baseline. Hgb decreased from baseline.     Home Health S4 Level Recommendation:  NA  AM-PAC Mobility Score       AM-PAC Inpatient Mobility without Stair Climbing Raw Score : 15    Preadmission Environment    Pt.  Lives with family (wife and step son )- wife works and step son works and pt is home alone   Home environment:    one story home  Steps to enter first floor: 2 steps with no railing     there is a step down to family room with bar to hold onto   Steps to second floor: N/A  Bathroom: tub/shower unit, grab bars and standard height commode  Equipment owned: RW, shower chair/bench, SPC and raised toilet     Preadmission Status:  Pt. Able to drive: No  Pt Fully independent with ADLs: No  Pt. Required assistance from family for: Bathing, Cleaning, Cooking, Dressing and Laundry   Pt. independent for transfers and gait and [facility] (at times)  History of falls Yes  Pt sleeps on a couch    Pain   No  Location: NA  Rating: NA /10  Pain Medicine Status: No request made    Cognition    A&O Person and Place, states \"June 22, 2022\"  Able to follow 2 step commands    Subjective  Patient lying supine in bed with spouse present. Pt agreeable to this PT eval & tx. Upper Extremity ROM/Strength  Please see OT evaluation. Lower Extremity ROM / Strength   AROM WFL: No  ROM limitations: Lacking MAURI ankle DF/PF ~5 deg DF to ~15 deg PF    Strength Assessment (measured on a 0-5 scale):  R LE   Quad   4   Ant Tib  5   Hamstring 4   Iliopsoas 3+ - hip ER and ABD compensation /c sartorius muscle   L LE  Quad   4+   Ant Tib  5   Hamstring 4+   Iliopsoas 4- - hip ER and ABD compensation /c sartorius muscle     Lower Extremity Sensation    WFL - h/o of R foot neuropathy, but able to detect light touch sensation in L3-S1 dermatomes. Lower Extremity Proprioception:   NT    Coordination and Tone  NT    Balance  Sitting:  Fair +; CGA  Comments: EOB ~6 min during vitals and strength assessment, use of BUE on bed for increased WILSON. Standing: Fair -; CGA  Comments: ~2 min during vitals assessment, observable BLE shaking, persistent slight flex in MAURI knees, no overt LOB.     Bed Mobility   Supine to Sit:    Min A   Sit to Supine:   Not Tested (pt up to chair at end of session)  Rolling:   Not Tested  Scooting in sitting: CGA  - increased difficulty unweighting pelvis to scoot towards EOB  Scooting in supine:  Not Tested    Transfer Training     Sit to stand: Min A  and 2 persons  Stand to sit:   CGA  Bed to Chair:   CGA with use of gait belt and rolling walker (RW)    Gait gait completed as indicated below  Distance:      5 ft  Deviations (firm surface/linoleum):  decreased evin, increased WILSON, forward flexed posture and decreased step length bilaterally  Assistive Device Used:    gait belt and rolling walker (RW)  Level of Assist:    CGA  Comment: increased time to complete, decreased foot clearance on R>L 2/2 increased weakness in R hip flexors, VC given to improve RLE foot clearance, no overt LOB    Stair Training deferred, pt unsafe/ not appropriate to complete stairs at this time      Activity Tolerance   Pt completed therapy session with No adverse symptoms noted w/activity   BP supine 99/59 HR 64 Sp02 100%  BP sitting edge of bed 95/ 54  BP standin/53    Positioning Needs   Pt up in chair, alarm set, positioned in proper neutral alignment and pressure relief provided. Call light provided and all needs within reach    Exercises Initiated  all completed bilaterally unless indicated and completed in seated position  Ankle Pumps x 10 reps  LAQ x 10 reps  marching x 10 reps     Standing balance exercises:  Lateral weightshifts x 10 reps each  Standing marches x 10 reps    Other  None. Patient/Family Education   Pt educated on role of inpatient PT, POC, importance of continued activity, DC recommendations, transfer techniques, HEP and calling for assist with mobility. Pt educated to perform above therapeutic exercises frequently throughout the day to improve MAURI strength and endurance. Assessment  Pt seen for Physical Therapy evaluation in acute care setting. Pt's PLOF includes living with spouse who provides assistance for ADLs and household chores, however, is IND /c transfers and amb /c RW prn. Pt presents today needing Min A for bed mobility and STS, and CGA for ambulation.  Pt demonstrates decreased muscular endurance, mild balance disturbances and RLE weakness greater than L that impairs R foot clearance during ambulation. VC given to increase R hip flexion to improve foot clearance. Pt would benefit from continued skilled physical therapy services in order to improve functional mobility, safety, and overall independence. Recommending SNF upon discharge as patient functioning well below baseline, demonstrates good rehab potential and unable to return home due to burden of care beyond caregiver ability, home environment not conducive to patient recovery and limited safety awareness. Goals : To be met in 3 visits:  1). Independent with LE Ex x 10 reps    To be met in 6 visits:  1). Supine to/from sit: SBA  2). Sit to/from stand: SBA  3). Bed to chair: SBA  4). Gait: Ambulate 50 ft.  with  SBA and use of rolling walker (RW)  5). Tolerate B LE exercises 3 sets of 10-15 reps  6). Ascend/descend 2 steps with SBA with use of no handrail and rolling walker (RW)    Rehabilitation Potential: Fair  Strengths for achieving goals include:   PLOF and Pt cooperative   Barriers to achieving goals include:    Weakness    Plan    To be seen 3-5 x / week  while in acute care setting for therapeutic exercises, bed mobility, transfers, progressive gait training, balance training, and family/patient education. Signature: BERYL Navarro  Co-signature: Alok Kebede PT, DPT, OMT-C  #[id number]      If patient discharges from this facility prior to next visit, this note will serve as the Discharge Summary.

## 2022-06-27 NOTE — PROGRESS NOTES
Inpatient Occupational Therapy  Evaluation and Treatment    Unit: PCU  Date:  6/27/2022  Patient Name:    Delta Howard  Admitting diagnosis:  Cirrhosis [facility]) [K74.60]  Admit Date:  6/24/2022  Precautions/Restrictions/WB Status/ Lines/ Wounds/ Oxygen: Fall risk, Bed/chair alarm, Lines -Purewick catheter and Telemetry    Treatment Time:  9909-3316   Treatment Number: 1   Timed code treatment minutes  33 minutes   Total Treatment minutes:   43  minutes    Patient Goals for Therapy:  \" no stated \"      Discharge Recommendations: SNF  DME needs for discharge: defer to facility       Therapy recommendations for staff:   Assist of 1 with use of rolling walker (RW) for all transfers to/from chair    History of Present Illness: H&P on 1025-22   70-year-old male patient of Dr. Ramsey Haynes with hypertension hyperlipidemia coronary artery disease status post CABG COPD congestive heart failure alcohol abuse and cirrhosis who presents with worsening ascites. He saw Bebe Joel arrives earlier this week. There was consideration for cardiology referral and paracentesis. The patient had lower extremity edema. He is on outpatient diuretics. Has some confusion from baseline. Hgb decreased from baseline  Home Health S4 Level Recommendation:  NA  AM-PAC Score: 16    Preadmission Environment    Pt. Lives with family (wife and step son )- wife works and step son works and pt is home alone   Home environment:  one story home  Steps to enter first floor: 2 steps with no railing     there is a step down to family room with bar to hold onto   Steps to second floor: N/A  Bathroom: tub/shower unit, grab bars and standard height commode  Equipment owned: RW, shower chair/bench, SPC and raised toilet    Preadmission Status:  Pt. Able to drive: No  Pt Fully independent with ADLs: No  Pt.  Required assistance from family for: Cleaning, Cooking and Laundry   Pt. independent for transfers and gait and walked with Valentino Sings at times   History of falls Yes  Pt sleeps on a couch    Pain  None at rest     Cognition    A&O , place    Able to follow 2 step commands    Subjective  Patient lying supine in bed with family at bedside. Pt agreeable to this OT eval & tx. Upper Extremity ROM:    WFL    Upper Extremity Strength:    Left shoulder flex 4/5  All other WFL     Upper Extremity Sensation    WFL    Upper Extremity Proprioception:  WFL    Coordination and Tone  Diminished    Balance  Functional Sitting Balance:  WFL  Functional Standing Balance:Impaired    Bed mobility:    Supine to sit:   Min A   Sit to supine:   Not Tested  Rolling:    Not Tested  Scooting in sitting:  Min A   Scooting to head of bed:   Not Tested    Bridging:   Not Tested    Transfers:    Sit to stand:  CGA  Stand to sit:  CGA  Bed to chair:   CGA with RW and gait belt   Standard toilet: Not Tested  Bed to [facility]:  Not Tested    Dressing:      UE:   Not Tested  LE:    Mod assist     Bathing:    UE:  Not Tested  LE:  Not Tested    Eating:   Not Tested    Toileting:  Not Tested    Activity Tolerance   Pt completed therapy session with No adverse symptoms noted w/activity  BP supine 99/59 HR 64 Sp02 100%  BP sitting edge of bed 95/ 54  BP standin/53    Positioning Needs:   Pt up in chair, alarm set, positioned in proper neutral alignment and pressure relief provided. Call light provided and all needs within reach    Exercise / Activities Initiated:   N/A    Patient/Family Education:   Role of OT    Assessment of Deficits: Pt seen for Occupational therapy evaluation in acute care setting. Pt demonstrated decreased Activity tolerance, ADLs, Balance , Bed mobility, Safety Awareness and Transfers. Pt functioning below baseline and will likely benefit from skilled occupational therapy services to maximize safety and independence. Goal(s) : To be met in 3 Visits:  1). Bed to toilet/BSC: SBA with RW    To be met in 5 Visits:  1). Supine to/from Sit:  SBA  2).  Upper Body Bathing:   SBA  3). Lower Body Bathing:   Min A   4). Upper Body Dressing:  SBA  5). Lower Body Dressing:  Min A to mod   6). Pt to demonstrate UE exs x 15 reps with minimal cues    Rehabilitation Potential:  Fair for goals listed above. Strengths for achieving goals include: Pt cooperative  Barriers to achieving goals include:  Complexity of condition     Plan: To be seen 3-5 x/wk while in acute care setting for therapeutic exercises, bed mobility, transfers, dressing, bathing, family/patient education, ADL/IADL retraining, energy conservation training.      [facility] OTR/L [id number]            If patient discharges from this facility prior to next visit, this note will serve as the Discharge Summary

## 2022-06-27 NOTE — FLOWSHEET NOTE
Assisted spouse Sarah Chirinos with completion of her own ACP documents, prayer with patient and spouse for hope & confidence in micky. Kemar Martins  Thank you for consulting Spiritual Care    If you need a  for emotional, spiritual or comfort care,   -or- assistance with [address] or Living Will forms,  please dial \"0\" and ask for the [address] [place] [address] on-call to be paged.     You may also call us directly:  [phone] Barrios  [phone]) [facility]  [phone] ([phone]) Outpatient

## 2022-06-27 NOTE — FLOWSHEET NOTE
06/27/22 1230   Vital Signs   Temp 97.6 °F (36.4 °C)   Temp Source Oral   Heart Rate 61   Heart Rate Source Monitor   Resp 17   BP (!) 92/55   BP Location Left upper arm   MAP (Calculated) 67.33   Patient Position Semi fowlers   Level of Consciousness Alert (0)   MEWS Score 2   Pain Assessment   Pain Assessment None - Denies Pain   Pain Level 0   Patient's Stated Pain Goal 0 - No pain   Oxygen Therapy   SpO2 96 %   O2 Device None (Room air)   O2 Flow Rate (L/min) 0 L/min        06/27/22 1230   Vital Signs   Temp 97.6 °F (36.4 °C)   Temp Source Oral   Heart Rate 61   Heart Rate Source Monitor   Resp 17   BP (!) 92/55   BP Location Left upper arm   MAP (Calculated) 67.33   Patient Position Semi fowlers   Level of Consciousness Alert (0)   MEWS Score 2   Pain Assessment   Pain Assessment None - Denies Pain   Pain Level 0   Patient's Stated Pain Goal 0 - No pain   Oxygen Therapy   SpO2 96 %   O2 Device None (Room air)   O2 Flow Rate (L/min) 0 L/min     Afternoon vitals completed. Midodrine given as scheduled. Pt stable.     Carolee Wallace, RN

## 2022-06-27 NOTE — PROGRESS NOTES
Hospitalist Progress Note      PCP: Viola Dallas MD    Date of Admission: 6/24/2022    Chief Complaint: hepatic encephalopathy, fall at home. Subjective:     More awake and alert. Denies new complaint today. Plenty of BM since lactulose started - I will further decrease his dose today. 1.7L of ascitic fluid removed with paracentesis on 6/26/2022        Medications:  Reviewed    Infusion Medications    octreotide (SandoSTATIN) infusion 50 mcg/hr (06/27/22 7705)    sodium chloride       Scheduled Medications    pantoprazole  40 mg IntraVENous BID    magnesium sulfate  4,000 mg IntraVENous Once    lactulose  20 g Oral BID    torsemide  20 mg Oral Daily    Vitamin D  2,000 Units Oral Daily    nadolol  20 mg Oral Daily    lactobacillus  1 capsule Oral Daily    vitamin B-12  1,000 mcg Oral Daily    sodium chloride flush  5-40 mL IntraVENous 2 times per day    budesonide-formoterol  1 puff Inhalation BID    ipratropium-albuterol  1 vial Inhalation BID    midodrine  10 mg Oral TID WC    allopurinol  100 mg Oral Daily     PRN Meds: sodium chloride flush, sodium chloride, ondansetron **OR** ondansetron, polyethylene glycol, acetaminophen **OR** acetaminophen, potassium chloride **OR** potassium alternative oral replacement **OR** potassium chloride, magnesium sulfate, albuterol sulfate HFA      Intake/Output Summary (Last 24 hours) at 6/27/2022 1234  Last data filed at 6/27/2022 0830  Gross per 24 hour   Intake 2954.41 ml   Output 1 ml   Net 2953.41 ml       Physical Exam Performed:    BP (!) 92/55   Pulse 61   Temp 97.6 °F (36.4 °C) (Oral)   Resp 17   Ht 6' (1.829 m)   Wt 222 lb 9.6 oz (101 kg)   SpO2 96%   BMI 30.19 kg/m²     General appearance: No apparent distress, appears stated age and cooperative. HEENT: Pupils equal, round, and reactive to light. Conjunctivae/corneas clear. Neck: Supple, with full range of motion. No jugular venous distention.  Trachea midline. Respiratory:  Normal respiratory effort. Clear to auscultation, bilaterally without Rales/Wheezes/Rhonchi. Cardiovascular: Regular rate and rhythm with normal S1/S2 without murmurs, rubs or gallops. Abdomen: Soft, non-tender, non-distended with normal bowel sounds. Musculoskeletal: No clubbing, cyanosis or edema bilaterally. Full range of motion without deformity. Skin: Skin color, texture, turgor normal.  No rashes or lesions. Neurologic:  Neurovascularly intact without any focal sensory/motor deficits. Cranial nerves: II-XII intact, grossly non-focal.  Psychiatric: Alert and oriented, thought content appropriate, normal insight  Capillary Refill: Brisk,3 seconds, normal   Peripheral Pulses: +2 palpable, equal bilaterally       Labs:   Recent Labs     06/25/22 0031 06/25/22 0031 06/26/22  0433 06/26/22  0433 06/26/22  1344 06/26/22  1946 06/27/22  0446   WBC 5.2  --  3.7*  --   --   --  3.6*   HGB 7.1*   < > 6.3*   < > 6.9* 7.7* 7.8*   HCT 22.3*   < > 19.5*   < > 21.7* 23.7* 24.6*   *  --  99*  --   --   --  94*    < > = values in this interval not displayed. Recent Labs     06/25/22 0031 06/26/22  0433 06/27/22  0446   * 134* 132*   K 3.1* 3.3* 4.3   CL 92* 95* 94*   CO2 30 27 28   BUN 31* 26* 20   CREATININE 2.5* 1.7* 1.3   CALCIUM 8.3 8.6 8.8   PHOS  --  3.4 2.7     Recent Labs     06/25/22 0031   *   ALT 29   BILITOT 3.5*   ALKPHOS 120     Recent Labs     06/25/22  0031 06/26/22  0804 06/27/22  0446   INR 2.32* 1.76* 1.53*     No results for input(s): Nettie Brunner in the last 72 hours. Urinalysis:      Lab Results   Component Value Date    NITRU Negative 02/22/2018    WBCUA 0-2 11/20/2017    BACTERIA RARE 06/24/2022    RBCUA 20-50 11/20/2017    BLOODU Negative 02/22/2018    SPECGRAV 1.020 06/24/2022    GLUCOSEU 100 06/24/2022       Radiology:  US GUIDED PARACENTESIS   Final Result   Successful paracentesis. US LIVER   Final Result   1.  Small to

## 2022-06-27 NOTE — FLOWSHEET NOTE
06/27/22 0830   Vital Signs   Temp 96.9 °F (36.1 °C)   Temp Source Oral   Heart Rate 68   Heart Rate Source Monitor   Resp 18   /60   BP Location Left upper arm   MAP (Calculated) 74   Patient Position Semi fowlers   Level of Consciousness Alert (0)   MEWS Score 1   Pain Assessment   Pain Assessment None - Denies Pain   Patient's Stated Pain Goal 0 - No pain   Oxygen Therapy   SpO2 97 %   O2 Device None (Room air)   O2 Flow Rate (L/min) 0 L/min       Pt assessment complete; see flow sheets. O/a x4. Vitals completed. Meds given per MAR. Wife at bedside; updated on care plan. MG replaced this morning. Pt stable.     Sarai Wynne RN

## 2022-06-27 NOTE — PROGRESS NOTES
RT Inhaler-Nebulizer Bronchodilator Protocol Note    There is a bronchodilator order in the chart from a provider indicating to follow the RT Bronchodilator Protocol and there is an Initiate RT Inhaler-Nebulizer Bronchodilator Protocol order as well (see protocol at bottom of note). CXR Findings:  No results found. The findings from the last RT Protocol Assessment were as follows:   History Pulmonary Disease: Chronic pulmonary disease  Respiratory Pattern: Regular pattern and RR 12-20 bpm  Breath Sounds: Slightly diminished and/or crackles  Cough: Strong, spontaneous, non-productive  Indication for Bronchodilator Therapy: Decreased or absent breath sounds  Bronchodilator Assessment Score: 4    Aerosolized bronchodilator medication orders have been revised according to the RT Inhaler-Nebulizer Bronchodilator Protocol below. Respiratory Therapist to perform RT Therapy Protocol Assessment initially then follow the protocol. Repeat RT Therapy Protocol Assessment PRN for score 0-3 or on second treatment, BID, and PRN for scores above 3. No Indications - adjust the frequency to every 6 hours PRN wheezing or bronchospasm, if no treatments needed after 48 hours then discontinue using Per Protocol order mode. If indication present, adjust the RT bronchodilator orders based on the Bronchodilator Assessment Score as indicated below. Use Inhaler orders unless patient has one or more of the following: on home nebulizer, not able to hold breath for 10 seconds, is not alert and oriented, cannot activate and use MDI correctly, or respiratory rate 25 breaths per minute or more, then use the equivalent nebulizer order(s) with same Frequency and PRN reasons based on the score. If a patient is on this medication at home then do not decrease Frequency below that used at home.     0-3 - enter or revise RT bronchodilator order(s) to equivalent RT Bronchodilator order with Frequency of every 4 hours PRN for wheezing or increased work of breathing using Per Protocol order mode. 4-6 - enter or revise RT Bronchodilator order(s) to two equivalent RT bronchodilator orders with one order with BID Frequency and one order with Frequency of every 4 hours PRN wheezing or increased work of breathing using Per Protocol order mode. 7-10 - enter or revise RT Bronchodilator order(s) to two equivalent RT bronchodilator orders with one order with TID Frequency and one order with Frequency of every 4 hours PRN wheezing or increased work of breathing using Per Protocol order mode. 11-13 - enter or revise RT Bronchodilator order(s) to one equivalent RT bronchodilator order with QID Frequency and an Albuterol order with Frequency of every 4 hours PRN wheezing or increased work of breathing using Per Protocol order mode. Greater than 13 - enter or revise RT Bronchodilator order(s) to one equivalent RT bronchodilator order with every 4 hours Frequency and an Albuterol order with Frequency of every 2 hours PRN wheezing or increased work of breathing using Per Protocol order mode. RT to enter RT Home Evaluation for COPD & MDI Assessment order using Per Protocol order mode.     Electronically signed by [employer] Back on 6/27/2022 at 7:30 AM

## 2022-06-27 NOTE — PROGRESS NOTES
Bedside report and [facility]. Pt currently resting in bed with the call light within reach. Pt denies any other care needs at this time. Pt stable at this time.     Keyonna Hahn RN

## 2022-06-27 NOTE — PROGRESS NOTES
PROGRESS NOTE  S:72 yrs Patient  admitted on 6/24/2022 with Cirrhosis ([facility] [address] [K74.60] . Today he complains of bloating and diarrhea. Patient reports black BMs. Nurse reports BM this AM was brown. He is tolerating clear liquid diet. Exam:   Vitals:    06/27/22 1230   BP: (!) 92/55   Pulse: 61   Resp: 17   Temp: 97.6 °F (36.4 °C)   SpO2: 96%      General appearance: alert, appears older than stated age, cooperative, morbidly obese and syndromic appearance - chronically ill appearing  HEENT: Neck supple with midline trachea  Neck: no adenopathy and supple, symmetrical, trachea midline  Lungs: clear to auscultation bilaterally  Heart: regular rate and rhythm, S1, S2 normal, no murmur, click, rub or gallop  Abdomen: normal findings: bowel sounds normal, no masses palpable, soft, non-tender and symmetric and abnormal findings:  distended and obese  Extremities: edema 2+ BLE     Medications: Reviewed    Labs:  CBC:   Recent Labs     06/25/22  0031 06/25/22  0031 06/26/22  0433 06/26/22  0433 06/26/22  1344 06/26/22  1946 06/27/22  0446   WBC 5.2  --  3.7*  --   --   --  3.6*   HGB 7.1*   < > 6.3*   < > 6.9* 7.7* 7.8*   HCT 22.3*   < > 19.5*   < > 21.7* 23.7* 24.6*   MCV 89.4  --  87.6  --   --   --  89.7   *  --  99*  --   --   --  94*    < > = values in this interval not displayed. BMP:   Recent Labs     06/25/22  0031 06/26/22  0433 06/27/22  0446   * 134* 132*   K 3.1* 3.3* 4.3   CL 92* 95* 94*   CO2 30 27 28   PHOS  --  3.4 2.7   BUN 31* 26* 20   CREATININE 2.5* 1.7* 1.3     LIVER PROFILE:   Recent Labs     06/25/22 0031   *   ALT 29   PROT 4.9*   BILITOT 3.5*   ALKPHOS 120     PT/INR:   Recent Labs     06/25/22  0031 06/26/22  0804 06/27/22  0446   INR 2.32* 1.76* 1.53*       IMAGING:  US GUIDED PARACENTESIS   Final Result   Successful paracentesis. US LIVER   Final Result   1. Small to moderate ascites potentially due to portal hypertension. 2. Limited visualization of the liver with cirrhotic changes. Attending Supervising [address] [place] [address] Attestation Statement  The patient is a 67 y.o. male. I have performed a history and physical examination of the patient. I discussed the case with my physician assistant Maria M Bee PA-C    I reviewed the patient's Past Medical History, Past Surgical History, Medications, and Allergies. Physical Exam:  Vitals:    06/27/22 0729 06/27/22 0830 06/27/22 1230 06/27/22 1700   BP:  102/60 (!) 92/55 (!) 96/52   Pulse:  68 61 73   Resp:  18 17    Temp:  96.9 °F (36.1 °C) 97.6 °F (36.4 °C)    TempSrc:  Oral Oral    SpO2: 96% 97% 96%    Weight:       Height:           Physical Examination: General appearance - chronically ill appearing  Mental status - alert, oriented to person, place, and time  Eyes - sclera icteric  Neck - supple, no significant adenopathy  Chest - clear to auscultation  Heart - normal rate and regular rhythm  Abdomen - distended, soft, NT  Extremities - pedal edema 2 +        Impression: 67year old male with a history of HLD, HTN, CAP s/p CABG, COPD, CHF, alcohol abuse, and alcohol induced cirrhosis decompensated by esophageal varices admitted with and hepatic encephalopathy, SEAN, and acute blood loss anemia. He is occult positive. Recommendation:  1. Continue supportive care  2. Monitor and document output  3. Monitor and replenish electrolytes  4. Minimize narcotics as able  5. Continue Pantoprazole 40 mg BID  6. Continue lactulose TID and Nadolol qd   7. Continue clear liquid diet   8. Nephrology following - appreciate diuretic recommendations   9. Encourage PT/OT  10.  alcohol abstinence   11. Will follow  12.  Will consider EGD +/- colonoscopy tomorrow      Jaime Ng PA-C  1:22 PM 6/27/2022                      67year old male with a history of HLD, HTN, CAD s/p CABG, COPD, CHF, alcohol abuse, and cirrhosis decompensated by esophageal varices and encephalopathy admitted with hepatic encephalopathy and anemia. Continue supportive care. Lactulose TID. Add xifaxan. Continue PPI BID. PT/OT. Counseled alcohol abstinence.  Will plan for EGD tomorrow    Kenia Lu MD          (O) [phone]  Celia Owen

## 2022-06-27 NOTE — CARE COORDINATION
Case Management Assessment  Initial Evaluation      Patient Name: Alise Juárez  YOB: 1950  Diagnosis: Cirrhosis ([address] [K74.60]  Date / Time: 6/24/2022  9:19 PM    Admission status/Date:06/24/2022 Inpatient  Chart Reviewed: Yes      Patient Interviewed: Yes   Family Interviewed:  Yes - pt's wife Nata Torres      Hospitalization in the last 30 days:  No    Health Care Decision Maker :   Primary Decision Maker: [address] [place] [address]  - [phone]    Secondary Decision Maker: Kraig Friaslisbeth - Other - [phone]    Supplemental (Other) Decision Maker: [facility] Child - [phone]    (CM - must 1st enter selection under Navigator - emergency contact- [place] Relationship and pick relationship)   Who do you trust or have selected to make healthcare decisions for you    Met with: pt and his wife at bedside    Current PCP: Consuelo Castelan MD    Financial  Medicare and [facility] required for SNF : N          3 night stay required -  N    ADLS  Support Systems/Care Needs: Spouse/Significant Other  Transportation: wife    Meal Preparation: wife    Housing  Living Arrangements: pt lives at home with his wife and step son   Steps: 2 tall steps from the garage  Intent for return to present living arrangements: Yes  Identified Issues: [id number] [facility] with [address] : No Agency:(Services)  Type of Home Care Services: None  Passport/Waiver : No  :                      Phone Number:    Passport/Waiver Services: n/a          Durable Medical Equiptment   DME Provider: n/a  Equipment:   Walker_x (rolling)__Cane_x__RTS___ BSC___Shower Chair___Hospital Bed___W/C____Other________  02 at ____Liter(s)---wears(frequency)_______ [facility] ___ CPAP___ BiPap___   N/A____    Home O2 Use :  No    If No for home O2---if presently on O2 during hospitalization:  No  if yes CM to follow for potential DC O2 need    [employer] Affiliation  Dialysis:  No    · Agency:  · Location:  · Dialysis Schedule:  · Phone:   · Fax: Other Community Services: n/a    DISCHARGE PLAN: Explained Case Management role/services. Chart review completed. Met with pt and his wife at bedside whom pt stated could remain present. He stated that his wife has been helping him with his ADL's lately. His wife stated she works and he is alone when she works. He stated he would be open to discussing SNF vs HHC depending what PT/OT recommends. He denied needs for CM. CM will follow. Please notify CM if needs or concerns arise. Angella RYDER, LISW-S     Addendum at 3:24pm: PT/OT recommended SNF. Met with pt and his wife at bedside. Discussed SNF recommendations and provided them with a list to review, along with showing the [facility] ratings. Pt stated he hasn't been to a SNF before. Explained that days 1-20 are covered at 100% and starting day 21, there is a co-pay that the SNF would let them know if the secondary insurance would cover it. They stated understanding. Pt and his wife requested referral to [facility] ([facility] They stated first opening on transportation. CM contact information provided. Attempted to call referral to [facility] at [facility] but the voicemail was full and unable to leave a message.

## 2022-06-27 NOTE — PROGRESS NOTES
[facility]  Nephrology follow-up note           Reason for Consult: SEAN  Requesting Physician:  Dr. Rosalina García history  Patient is doing better   Para 1.6l on 6/26    ROS: No chest pain/shortness of breath/fever/nausea/vomiting  PSFH: + visitor    Scheduled Meds:   pantoprazole  40 mg IntraVENous BID    magnesium sulfate  4,000 mg IntraVENous Once    lactulose  20 g Oral BID    Vitamin D  2,000 Units Oral Daily    nadolol  20 mg Oral Daily    lactobacillus  1 capsule Oral Daily    vitamin B-12  1,000 mcg Oral Daily    sodium chloride flush  5-40 mL IntraVENous 2 times per day    budesonide-formoterol  1 puff Inhalation BID    ipratropium-albuterol  1 vial Inhalation BID    midodrine  10 mg Oral TID WC    allopurinol  100 mg Oral Daily     Continuous Infusions:   octreotide (SandoSTATIN) infusion 50 mcg/hr (06/27/22 8707)    sodium chloride       PRN Meds:.sodium chloride flush, sodium chloride, ondansetron **OR** ondansetron, polyethylene glycol, acetaminophen **OR** acetaminophen, potassium chloride **OR** potassium alternative oral replacement **OR** potassium chloride, magnesium sulfate, albuterol sulfate HFA    History of Present Illness on 6/25/2022:    67 y.o. yo male with PMH of CAD status post CABG, COPD, hypertension, LINSEY, alcoholic cirrhosis who is admitted for SEAN, increasing ascites and lethargy  Patient fell at home on Thursday and needed 2 person to get him up; will continue to be weak and not able to do his usual activities and started getting more lethargic as well. It is noted by the wife that his abdomen was getting bigger as well.   Patient was recently seen in GI clinic and was maintained on diuretics  As the symptoms persisted, patient was taken to outside hospital ER and has been transferred to [facility] last night  Creatinine has increased to 2.5 and nephrology has been consulted  Upon chart review, baseline creatinine seems to be around the low ones; creatinine was 1.5 on 6/22    Physical exam:   Constitutional:  VITALS:  /60   Pulse 68   Temp 96.9 °F (36.1 °C) (Oral)   Resp 18   Ht 6' (1.829 m)   Wt 222 lb 9.6 oz (101 kg)   SpO2 97%   BMI 30.19 kg/m²   Gen: Arousable, but very sleepy  Neck: No JVD  Skin: Jaundiced  Cardiovascular:  S1, S2 without m/r/g   Respiratory: Creased breath sounds at the bases  Abdomen:  soft, nt, nd,   Extremities: 1+ lower extremity edema  Neuro/Psy: AAoriented times 2-3 ; moves all 4 ext    Data/  Recent Labs     06/25/22  0031 06/25/22  0031 06/26/22 0433 06/26/22  0433 06/26/22  1344 06/26/22  1946 06/27/22  0446   WBC 5.2  --  3.7*  --   --   --  3.6*   HGB 7.1*   < > 6.3*   < > 6.9* 7.7* 7.8*   HCT 22.3*   < > 19.5*   < > 21.7* 23.7* 24.6*   MCV 89.4  --  87.6  --   --   --  89.7   *  --  99*  --   --   --  94*    < > = values in this interval not displayed. Recent Labs     06/25/22 0031 06/26/22 0433 06/27/22  0446   * 134* 132*   K 3.1* 3.3* 4.3   CL 92* 95* 94*   CO2 30 27 28   GLUCOSE 79 89 100*   PHOS  --  3.4 2.7   MG 1.40*  1.40* 1.80 1.70*   BUN 31* 26* 20   CREATININE 2.5* 1.7* 1.3   LABGLOM 26* 40* 54*   GFRAA 31* 48* >60     Chest x-ray clear  UA bland  Urine sodium less than 20, creatinine 143    Assessment  -SEAN in the setting of liver cirrhosis, hypotension, anemia, diuretic use with bland UA. Most likely is prerenal in etiology.   Not likely to be HRS    -Hypokalemia    -Hypomagnesemia    -Hypotension    -Anemia and thrombocytopenia   Platelet count likely related to cirrhosis of liver   Defer to GI   Eliquis on hold   1 PRBC on 7/92    -Alcoholic liver cirrhosis with hepatic encephalopathy and ascites   Para on 6/26 for 1.6l    Plan  -cont Midodrine 10 mg 3 times daily  - torsemide 20 mg daily   -Hold Farxiga and antihypertensives  -Hold nadolol for systolic blood pressure less than 110  - allopurinol to 100 mg daily [decreased on 6/25]  -Serial renal panel  -daily wts and strict i/o  -renal dose medications   -avoid nephrotoxins      Thank you for the consultation. Please do not hesitate to call with questions. Jerad Galeano MD  Office: [phone]  Fax:    [phone]  [email or web address]

## 2022-06-27 NOTE — PLAN OF CARE
Problem: Discharge Planning  Goal: Discharge to home or other facility with appropriate resources  Outcome: Progressing  Flowsheets (Taken 6/26/2022 2251)  Discharge to home or other facility with appropriate resources: Identify barriers to discharge with patient and caregiver     Problem: Safety - Adult  Goal: Free from fall injury  Outcome: Progressing  Flowsheets (Taken 6/26/2022 2249)  Free From Fall Injury: Instruct family/caregiver on patient safety     Problem: ABCDS Injury Assessment  Goal: Absence of physical injury  Outcome: Progressing  Flowsheets (Taken 6/26/2022 2251)  Absence of Physical Injury: Implement safety measures based on patient assessment     Problem: Skin/Tissue Integrity  Goal: Absence of new skin breakdown  Description: 1. Monitor for areas of redness and/or skin breakdown  2. Assess vascular access sites hourly  3. Every 4-6 hours minimum:  Change oxygen saturation probe site  4. Every 4-6 hours:  If on nasal continuous positive airway pressure, respiratory therapy assess nares and determine need for appliance change or resting period.   Outcome: Progressing

## 2022-06-27 NOTE — FLOWSHEET NOTE
06/27/22 1700   Vital Signs   Heart Rate 73   BP (!) 96/52   MAP (Calculated) 66.67     Vitals as shown above. Midodrine given.     Keyonna Hahn RN

## 2022-06-28 ENCOUNTER — ANESTHESIA (OUTPATIENT)
Dept: ENDOSCOPY | Age: 72
DRG: 433 | End: 2022-06-28
Payer: MEDICARE

## 2022-06-28 ENCOUNTER — ANESTHESIA EVENT (OUTPATIENT)
Dept: ENDOSCOPY | Age: 72
DRG: 433 | End: 2022-06-28
Payer: MEDICARE

## 2022-06-28 LAB
ALBUMIN SERPL-MCNC: 3.1 G/DL (ref 3.4–5)
ANION GAP SERPL CALCULATED.3IONS-SCNC: 8 MMOL/L (ref 3–16)
BASOPHILS ABSOLUTE: 0 K/UL (ref 0–0.2)
BASOPHILS RELATIVE PERCENT: 1 %
BUN BLDV-MCNC: 17 MG/DL (ref 7–20)
CALCIUM SERPL-MCNC: 8.7 MG/DL (ref 8.3–10.6)
CHLORIDE BLD-SCNC: 93 MMOL/L (ref 99–110)
CO2: 30 MMOL/L (ref 21–32)
CREAT SERPL-MCNC: 1.4 MG/DL (ref 0.8–1.3)
EOSINOPHILS ABSOLUTE: 0.2 K/UL (ref 0–0.6)
EOSINOPHILS RELATIVE PERCENT: 5.6 %
GFR AFRICAN AMERICAN: >60
GFR NON-AFRICAN AMERICAN: 50
GLUCOSE BLD-MCNC: 94 MG/DL (ref 70–99)
HCT VFR BLD CALC: 26.1 % (ref 40.5–52.5)
HEMOGLOBIN: 8.2 G/DL (ref 13.5–17.5)
LYMPHOCYTES ABSOLUTE: 0.7 K/UL (ref 1–5.1)
LYMPHOCYTES RELATIVE PERCENT: 16.9 %
MAGNESIUM: 2 MG/DL (ref 1.8–2.4)
MCH RBC QN AUTO: 28.4 PG (ref 26–34)
MCHC RBC AUTO-ENTMCNC: 31.2 G/DL (ref 31–36)
MCV RBC AUTO: 90.8 FL (ref 80–100)
MONOCYTES ABSOLUTE: 0.9 K/UL (ref 0–1.3)
MONOCYTES RELATIVE PERCENT: 21.7 %
NEUTROPHILS ABSOLUTE: 2.2 K/UL (ref 1.7–7.7)
NEUTROPHILS RELATIVE PERCENT: 54.8 %
PDW BLD-RTO: 18.4 % (ref 12.4–15.4)
PHOSPHORUS: 2.4 MG/DL (ref 2.5–4.9)
PLATELET # BLD: 101 K/UL (ref 135–450)
PMV BLD AUTO: 7.7 FL (ref 5–10.5)
POTASSIUM SERPL-SCNC: 4.4 MMOL/L (ref 3.5–5.1)
RBC # BLD: 2.87 M/UL (ref 4.2–5.9)
SODIUM BLD-SCNC: 131 MMOL/L (ref 136–145)
WBC # BLD: 4.1 K/UL (ref 4–11)

## 2022-06-28 PROCEDURE — 80069 RENAL FUNCTION PANEL: CPT

## 2022-06-28 PROCEDURE — 6370000000 HC RX 637 (ALT 250 FOR IP): Performed by: INTERNAL MEDICINE

## 2022-06-28 PROCEDURE — 2580000003 HC RX 258: Performed by: NURSE ANESTHETIST, CERTIFIED REGISTERED

## 2022-06-28 PROCEDURE — 7100000011 HC PHASE II RECOVERY - ADDTL 15 MIN: Performed by: INTERNAL MEDICINE

## 2022-06-28 PROCEDURE — 85025 COMPLETE CBC W/AUTO DIFF WBC: CPT

## 2022-06-28 PROCEDURE — 94640 AIRWAY INHALATION TREATMENT: CPT

## 2022-06-28 PROCEDURE — 99233 SBSQ HOSP IP/OBS HIGH 50: CPT | Performed by: INTERNAL MEDICINE

## 2022-06-28 PROCEDURE — 3700000001 HC ADD 15 MINUTES (ANESTHESIA): Performed by: INTERNAL MEDICINE

## 2022-06-28 PROCEDURE — 6370000000 HC RX 637 (ALT 250 FOR IP): Performed by: PHYSICIAN ASSISTANT

## 2022-06-28 PROCEDURE — 3700000000 HC ANESTHESIA ATTENDED CARE: Performed by: INTERNAL MEDICINE

## 2022-06-28 PROCEDURE — 6360000002 HC RX W HCPCS: Performed by: NURSE ANESTHETIST, CERTIFIED REGISTERED

## 2022-06-28 PROCEDURE — 83735 ASSAY OF MAGNESIUM: CPT

## 2022-06-28 PROCEDURE — C9113 INJ PANTOPRAZOLE SODIUM, VIA: HCPCS | Performed by: INTERNAL MEDICINE

## 2022-06-28 PROCEDURE — 36415 COLL VENOUS BLD VENIPUNCTURE: CPT

## 2022-06-28 PROCEDURE — 94761 N-INVAS EAR/PLS OXIMETRY MLT: CPT

## 2022-06-28 PROCEDURE — 2500000003 HC RX 250 WO HCPCS: Performed by: NURSE ANESTHETIST, CERTIFIED REGISTERED

## 2022-06-28 PROCEDURE — 3609017100 HC EGD: Performed by: INTERNAL MEDICINE

## 2022-06-28 PROCEDURE — 2060000000 HC ICU INTERMEDIATE R&B

## 2022-06-28 PROCEDURE — 2580000003 HC RX 258: Performed by: INTERNAL MEDICINE

## 2022-06-28 PROCEDURE — 0DJ08ZZ INSPECTION OF UPPER INTESTINAL TRACT, VIA NATURAL OR ARTIFICIAL OPENING ENDOSCOPIC: ICD-10-PCS | Performed by: INTERNAL MEDICINE

## 2022-06-28 PROCEDURE — 7100000010 HC PHASE II RECOVERY - FIRST 15 MIN: Performed by: INTERNAL MEDICINE

## 2022-06-28 PROCEDURE — 6360000002 HC RX W HCPCS: Performed by: INTERNAL MEDICINE

## 2022-06-28 PROCEDURE — 2709999900 HC NON-CHARGEABLE SUPPLY: Performed by: INTERNAL MEDICINE

## 2022-06-28 RX ORDER — OXYCODONE HYDROCHLORIDE 5 MG/1
5 TABLET ORAL PRN
Status: CANCELLED | OUTPATIENT
Start: 2022-06-28 | End: 2022-06-28

## 2022-06-28 RX ORDER — SODIUM CHLORIDE 0.9 % (FLUSH) 0.9 %
5-40 SYRINGE (ML) INJECTION PRN
Status: CANCELLED | OUTPATIENT
Start: 2022-06-28

## 2022-06-28 RX ORDER — PROPOFOL 10 MG/ML
INJECTION, EMULSION INTRAVENOUS PRN
Status: DISCONTINUED | OUTPATIENT
Start: 2022-06-28 | End: 2022-06-28 | Stop reason: SDUPTHER

## 2022-06-28 RX ORDER — MEPERIDINE HYDROCHLORIDE 25 MG/ML
12.5 INJECTION INTRAMUSCULAR; INTRAVENOUS; SUBCUTANEOUS EVERY 5 MIN PRN
Status: CANCELLED | OUTPATIENT
Start: 2022-06-28

## 2022-06-28 RX ORDER — SODIUM CHLORIDE 9 MG/ML
INJECTION, SOLUTION INTRAVENOUS PRN
Status: CANCELLED | OUTPATIENT
Start: 2022-06-28

## 2022-06-28 RX ORDER — LIDOCAINE HYDROCHLORIDE 20 MG/ML
INJECTION, SOLUTION INFILTRATION; PERINEURAL PRN
Status: DISCONTINUED | OUTPATIENT
Start: 2022-06-28 | End: 2022-06-28 | Stop reason: SDUPTHER

## 2022-06-28 RX ORDER — SODIUM CHLORIDE 9 MG/ML
25 INJECTION, SOLUTION INTRAVENOUS PRN
Status: CANCELLED | OUTPATIENT
Start: 2022-06-28

## 2022-06-28 RX ORDER — SODIUM CHLORIDE, SODIUM LACTATE, POTASSIUM CHLORIDE, CALCIUM CHLORIDE 600; 310; 30; 20 MG/100ML; MG/100ML; MG/100ML; MG/100ML
INJECTION, SOLUTION INTRAVENOUS CONTINUOUS
Status: CANCELLED | OUTPATIENT
Start: 2022-06-28

## 2022-06-28 RX ORDER — ONDANSETRON 2 MG/ML
4 INJECTION INTRAMUSCULAR; INTRAVENOUS
Status: CANCELLED | OUTPATIENT
Start: 2022-06-28 | End: 2022-06-28

## 2022-06-28 RX ORDER — SODIUM CHLORIDE 0.9 % (FLUSH) 0.9 %
5-40 SYRINGE (ML) INJECTION EVERY 12 HOURS SCHEDULED
Status: CANCELLED | OUTPATIENT
Start: 2022-06-28

## 2022-06-28 RX ORDER — SODIUM CHLORIDE, SODIUM LACTATE, POTASSIUM CHLORIDE, CALCIUM CHLORIDE 600; 310; 30; 20 MG/100ML; MG/100ML; MG/100ML; MG/100ML
INJECTION, SOLUTION INTRAVENOUS CONTINUOUS PRN
Status: DISCONTINUED | OUTPATIENT
Start: 2022-06-28 | End: 2022-06-28 | Stop reason: SDUPTHER

## 2022-06-28 RX ORDER — OXYCODONE HYDROCHLORIDE 5 MG/1
10 TABLET ORAL PRN
Status: CANCELLED | OUTPATIENT
Start: 2022-06-28 | End: 2022-06-28

## 2022-06-28 RX ADMIN — Medication 2000 UNITS: at 08:10

## 2022-06-28 RX ADMIN — Medication 1 CAPSULE: at 08:10

## 2022-06-28 RX ADMIN — Medication 1 PUFF: at 07:41

## 2022-06-28 RX ADMIN — PANTOPRAZOLE SODIUM 40 MG: 40 INJECTION, POWDER, FOR SOLUTION INTRAVENOUS at 08:10

## 2022-06-28 RX ADMIN — CYANOCOBALAMIN TAB 500 MCG 1000 MCG: 500 TAB at 08:10

## 2022-06-28 RX ADMIN — MIDODRINE HYDROCHLORIDE 10 MG: 10 TABLET ORAL at 17:04

## 2022-06-28 RX ADMIN — Medication 1 PUFF: at 19:12

## 2022-06-28 RX ADMIN — LIDOCAINE HYDROCHLORIDE 50 MG: 20 INJECTION, SOLUTION INFILTRATION; PERINEURAL at 12:44

## 2022-06-28 RX ADMIN — IPRATROPIUM BROMIDE AND ALBUTEROL SULFATE 3 ML: 2.5; .5 SOLUTION RESPIRATORY (INHALATION) at 07:41

## 2022-06-28 RX ADMIN — Medication 10 ML: at 08:11

## 2022-06-28 RX ADMIN — PROPOFOL 100 MG: 10 INJECTION, EMULSION INTRAVENOUS at 12:44

## 2022-06-28 RX ADMIN — PANTOPRAZOLE SODIUM 40 MG: 40 INJECTION, POWDER, FOR SOLUTION INTRAVENOUS at 20:03

## 2022-06-28 RX ADMIN — LACTULOSE 10 G: 10 SOLUTION ORAL at 20:03

## 2022-06-28 RX ADMIN — SODIUM CHLORIDE, POTASSIUM CHLORIDE, SODIUM LACTATE AND CALCIUM CHLORIDE: 600; 310; 30; 20 INJECTION, SOLUTION INTRAVENOUS at 12:35

## 2022-06-28 RX ADMIN — MIDODRINE HYDROCHLORIDE 10 MG: 10 TABLET ORAL at 13:33

## 2022-06-28 RX ADMIN — ALLOPURINOL 100 MG: 100 TABLET ORAL at 08:10

## 2022-06-28 RX ADMIN — IPRATROPIUM BROMIDE AND ALBUTEROL SULFATE 3 ML: 2.5; .5 SOLUTION RESPIRATORY (INHALATION) at 19:11

## 2022-06-28 RX ADMIN — LACTULOSE 10 G: 10 SOLUTION ORAL at 13:33

## 2022-06-28 RX ADMIN — Medication 10 ML: at 20:03

## 2022-06-28 RX ADMIN — MIDODRINE HYDROCHLORIDE 10 MG: 10 TABLET ORAL at 08:10

## 2022-06-28 ASSESSMENT — PAIN SCALES - GENERAL
PAINLEVEL_OUTOF10: 0

## 2022-06-28 ASSESSMENT — LIFESTYLE VARIABLES: SMOKING_STATUS: 0

## 2022-06-28 ASSESSMENT — ENCOUNTER SYMPTOMS: SHORTNESS OF BREATH: 1

## 2022-06-28 ASSESSMENT — PAIN - FUNCTIONAL ASSESSMENT: PAIN_FUNCTIONAL_ASSESSMENT: 0-10

## 2022-06-28 NOTE — BRIEF OP NOTE
Brief Postoperative Note      Patient: Johnson Krishna  YOB: 1950  MRN: [id number]    Date of Procedure: 6/28/2022    Pre-Op Diagnosis: ABDOMINAL PAIN    Post-Op Diagnosis: Grade I EV, hiatal hernia, gastropathy, GAVE, no active bleeding       Procedure(s):  EGD W/ANES. Surgeon(s):  Macarena Somers MD    Assistant:  * No surgical staff found *    Anesthesia: Monitor Anesthesia Care    Estimated Blood Loss (mL): Minimal    Complications: None    Specimens:   * No specimens in log *    Implants:  * No implants in log *      Drains:   External Urinary Catheter (Active)   Site Assessment Clean,dry & intact; Clean;Dry; Intact 06/28/22 0546   Placement Replaced 06/28/22 0546   Securement Method Securing device (Describe) 06/28/22 0546   Catheter Care Catheter/Wick replaced 06/28/22 0546   Perineal Care Yes 06/28/22 0546   Suction 40 mmgHg continuous 06/28/22 0546   Urine Color Yellow 06/28/22 0546   Urine Appearance Clear 06/28/22 0546   Output (mL) 300 mL 06/28/22 0546       Findings: Grade I EV, hiatal hernia, gastropathy, GAVE, no active bleeding    Recommendation  1. Continue supportive care  2. PPI BID  3. Monitor Hgb  4. Advance diet  5. Lactulose and Xifaxan  6. Alcohol abstinence  7. D/C octreotide  8. Start non-selective BB  9.  OK to d/c from GI standpoint    Electronically signed by Macarena Somers MD on 6/28/2022 at 12:56 PM

## 2022-06-28 NOTE — OP NOTE
[address]                 [address] [address]                                OPERATIVE REPORT    PATIENT NAME: Rey Anaya                   :        1950  MED REC NO:   [id number]                          ROOM:       L86  ACCOUNT NO:   [de-identified]                           ADMIT DATE: 2022  PROVIDER:     Edgar Dong MD    DATE OF PROCEDURE:  2022    PRE-PROCEDURE DIAGNOSES:  1.  Melena. 2.  Anemia. 3.  GI bleed. PROCEDURE:  EGD. POSTPROCEDURE DIAGNOSES:  1.  Grade 1 esophageal varices. 2.  Hiatal hernia. 3.  Moderate gastropathy. 4.  GAVE. 5.  No active bleeding. PROCEDURE INDICATIONS:  A 29-year-old male with a history of diabetes,  hypertension, hyperlipidemia, coronary artery disease status post CABG,  COPD, congestive heart failure, alcohol abuse (active), and cirrhosis  decompensated by esophageal varices and encephalopathy, admitted with  altered mental status secondary to severe uncontrolled hepatic  encephalopathy. He is also noted to have melenic stools for the past 2  months with a decline in hemoglobin from baseline of 11 down to 7. He  required 2 units of packed RBCs, and he responded appropriately. He  denies any recent signs of hematemesis or rectal bleeding. His last EGD  and colonoscopy in January this year showed grade 1 esophageal varices  and poor prep colonoscopy. MEDICATIONS:  MAC per Anesthesia. PROCEDURE DETAILS:  Informed consent obtained after discussing risks,  benefits, and alternatives. Full history and physical was performed. The patient was classified as ASA class III. Medications were given to  achieve adequate sedation. Cardiopulmonary status was continuously  monitored throughout the procedure. The patient was placed in left  lateral decubitus position.   Once adequately sedated, a standard upper  gastroscope was inserted in the mouth and advanced under direct  visualization to the second portion of the duodenum. The entire mucosa  of the esophagus, stomach (retroflexed and forward views), duodenum  (bulb, sweep and second portion) were examined carefully during  withdrawal.  The patient tolerated the procedure well without any  difficulties. FINDINGS:    ESOPHAGUS:  There was a small 2 cm hiatal hernia from 38 to 40 cm from  entry. There were three columns of grade 1 esophageal varices without  any active bleeding or high-risk stigmata. STOMACH:  There was evidence of prominent gastric folds suggestive of  gastropathy, likely alcoholic gastritis versus portal hypertensive  gastropathy. Retroflexed view of the stomach did not reveal any gastric  varices or bleeding lesions. There were mild erythematous lesions in  the antrum suggestive of GAVE, no active bleeding identified. DUODENUM:  Examined portion of the duodenum appeared normal.    SUMMARY:  1.  A 2 cm hiatal hernia. 2.  Grade 1 esophageal varices. 3.  Alcoholic gastritis. 4.  Portal hypertensive gastropathy. 5.  GAVE. 6.  No active bleeding. RECOMMENDATIONS:  1. Return the patient to floor for continuous medical care. 2.  Continue PPI daily b.i.d.  3.  Continue lactulose and Xifaxan. 4.   on alcohol abstinence. 5.  PT, OT.  6.  Encourage nutrition. 7.  Okay to discharge from GI standpoint.     EBL: <5mL    Tamela Monroe MD    D: 06/28/2022 12:56:10       T: 06/28/2022 12:58:35     [email or web address]  Job#: [id number]     Doc#: [id number]    CC:  MD Gabriele Carr MD

## 2022-06-28 NOTE — PROGRESS NOTES
RT Inhaler-Nebulizer Bronchodilator Protocol Note    There is a bronchodilator order in the chart from a provider indicating to follow the RT Bronchodilator Protocol and there is an Initiate RT Inhaler-Nebulizer Bronchodilator Protocol order as well (see protocol at bottom of note). CXR Findings:  No results found. The findings from the last RT Protocol Assessment were as follows:   History Pulmonary Disease: (P) Chronic pulmonary disease  Respiratory Pattern: (P) Regular pattern and RR 12-20 bpm  Breath Sounds: (P) Slightly diminished and/or crackles  Cough: (P) Strong, spontaneous, non-productive  Indication for Bronchodilator Therapy: (P) Decreased or absent breath sounds  Bronchodilator Assessment Score: (P) 4    Aerosolized bronchodilator medication orders have been revised according to the RT Inhaler-Nebulizer Bronchodilator Protocol below. Respiratory Therapist to perform RT Therapy Protocol Assessment initially then follow the protocol. Repeat RT Therapy Protocol Assessment PRN for score 0-3 or on second treatment, BID, and PRN for scores above 3. No Indications - adjust the frequency to every 6 hours PRN wheezing or bronchospasm, if no treatments needed after 48 hours then discontinue using Per Protocol order mode. If indication present, adjust the RT bronchodilator orders based on the Bronchodilator Assessment Score as indicated below. Use Inhaler orders unless patient has one or more of the following: on home nebulizer, not able to hold breath for 10 seconds, is not alert and oriented, cannot activate and use MDI correctly, or respiratory rate 25 breaths per minute or more, then use the equivalent nebulizer order(s) with same Frequency and PRN reasons based on the score. If a patient is on this medication at home then do not decrease Frequency below that used at home.     0-3 - enter or revise RT bronchodilator order(s) to equivalent RT Bronchodilator order with Frequency of every 4 hours PRN for wheezing or increased work of breathing using Per Protocol order mode. 4-6 - enter or revise RT Bronchodilator order(s) to two equivalent RT bronchodilator orders with one order with BID Frequency and one order with Frequency of every 4 hours PRN wheezing or increased work of breathing using Per Protocol order mode. 7-10 - enter or revise RT Bronchodilator order(s) to two equivalent RT bronchodilator orders with one order with TID Frequency and one order with Frequency of every 4 hours PRN wheezing or increased work of breathing using Per Protocol order mode. 11-13 - enter or revise RT Bronchodilator order(s) to one equivalent RT bronchodilator order with QID Frequency and an Albuterol order with Frequency of every 4 hours PRN wheezing or increased work of breathing using Per Protocol order mode. Greater than 13 - enter or revise RT Bronchodilator order(s) to one equivalent RT bronchodilator order with every 4 hours Frequency and an Albuterol order with Frequency of every 2 hours PRN wheezing or increased work of breathing using Per Protocol order mode.          Electronically signed by Joyce Rollins RCP on 6/27/2022 at 8:34 PM

## 2022-06-28 NOTE — PROGRESS NOTES
RT Inhaler-Nebulizer Bronchodilator Protocol Note    There is a bronchodilator order in the chart from a provider indicating to follow the RT Bronchodilator Protocol and there is an Initiate RT Inhaler-Nebulizer Bronchodilator Protocol order as well (see protocol at bottom of note). CXR Findings:  No results found. The findings from the last RT Protocol Assessment were as follows:   History Pulmonary Disease: (P) Chronic pulmonary disease  Respiratory Pattern: (P) Regular pattern and RR 12-20 bpm  Breath Sounds: (P) Slightly diminished and/or crackles  Cough: (P) Strong, spontaneous, non-productive  Indication for Bronchodilator Therapy: (P) Decreased or absent breath sounds  Bronchodilator Assessment Score: (P) 4    Aerosolized bronchodilator medication orders have been revised according to the RT Inhaler-Nebulizer Bronchodilator Protocol below. Respiratory Therapist to perform RT Therapy Protocol Assessment initially then follow the protocol. Repeat RT Therapy Protocol Assessment PRN for score 0-3 or on second treatment, BID, and PRN for scores above 3. No Indications - adjust the frequency to every 6 hours PRN wheezing or bronchospasm, if no treatments needed after 48 hours then discontinue using Per Protocol order mode. If indication present, adjust the RT bronchodilator orders based on the Bronchodilator Assessment Score as indicated below. Use Inhaler orders unless patient has one or more of the following: on home nebulizer, not able to hold breath for 10 seconds, is not alert and oriented, cannot activate and use MDI correctly, or respiratory rate 25 breaths per minute or more, then use the equivalent nebulizer order(s) with same Frequency and PRN reasons based on the score. If a patient is on this medication at home then do not decrease Frequency below that used at home.     0-3 - enter or revise RT bronchodilator order(s) to equivalent RT Bronchodilator order with Frequency of every 4 hours PRN for wheezing or increased work of breathing using Per Protocol order mode. 4-6 - enter or revise RT Bronchodilator order(s) to two equivalent RT bronchodilator orders with one order with BID Frequency and one order with Frequency of every 4 hours PRN wheezing or increased work of breathing using Per Protocol order mode. 7-10 - enter or revise RT Bronchodilator order(s) to two equivalent RT bronchodilator orders with one order with TID Frequency and one order with Frequency of every 4 hours PRN wheezing or increased work of breathing using Per Protocol order mode. 11-13 - enter or revise RT Bronchodilator order(s) to one equivalent RT bronchodilator order with QID Frequency and an Albuterol order with Frequency of every 4 hours PRN wheezing or increased work of breathing using Per Protocol order mode. Greater than 13 - enter or revise RT Bronchodilator order(s) to one equivalent RT bronchodilator order with every 4 hours Frequency and an Albuterol order with Frequency of every 2 hours PRN wheezing or increased work of breathing using Per Protocol order mode.          Electronically signed by Amparo Guerrier RCP on 6/28/2022 at 7:13 PM

## 2022-06-28 NOTE — PROGRESS NOTES
RT Inhaler-Nebulizer Bronchodilator Protocol Note    There is a bronchodilator order in the chart from a provider indicating to follow the RT Bronchodilator Protocol and there is an Initiate RT Inhaler-Nebulizer Bronchodilator Protocol order as well (see protocol at bottom of note). CXR Findings:  No results found. The findings from the last RT Protocol Assessment were as follows:   History Pulmonary Disease: Chronic pulmonary disease  Respiratory Pattern: Regular pattern and RR 12-20 bpm  Breath Sounds: Slightly diminished and/or crackles  Cough: Strong, spontaneous, non-productive  Indication for Bronchodilator Therapy: Decreased or absent breath sounds  Bronchodilator Assessment Score: 4    Aerosolized bronchodilator medication orders have been revised according to the RT Inhaler-Nebulizer Bronchodilator Protocol below. Respiratory Therapist to perform RT Therapy Protocol Assessment initially then follow the protocol. Repeat RT Therapy Protocol Assessment PRN for score 0-3 or on second treatment, BID, and PRN for scores above 3. No Indications - adjust the frequency to every 6 hours PRN wheezing or bronchospasm, if no treatments needed after 48 hours then discontinue using Per Protocol order mode. If indication present, adjust the RT bronchodilator orders based on the Bronchodilator Assessment Score as indicated below. Use Inhaler orders unless patient has one or more of the following: on home nebulizer, not able to hold breath for 10 seconds, is not alert and oriented, cannot activate and use MDI correctly, or respiratory rate 25 breaths per minute or more, then use the equivalent nebulizer order(s) with same Frequency and PRN reasons based on the score. If a patient is on this medication at home then do not decrease Frequency below that used at home.     0-3 - enter or revise RT bronchodilator order(s) to equivalent RT Bronchodilator order with Frequency of every 4 hours PRN for wheezing or increased work of breathing using Per Protocol order mode. 4-6 - enter or revise RT Bronchodilator order(s) to two equivalent RT bronchodilator orders with one order with BID Frequency and one order with Frequency of every 4 hours PRN wheezing or increased work of breathing using Per Protocol order mode. 7-10 - enter or revise RT Bronchodilator order(s) to two equivalent RT bronchodilator orders with one order with TID Frequency and one order with Frequency of every 4 hours PRN wheezing or increased work of breathing using Per Protocol order mode. 11-13 - enter or revise RT Bronchodilator order(s) to one equivalent RT bronchodilator order with QID Frequency and an Albuterol order with Frequency of every 4 hours PRN wheezing or increased work of breathing using Per Protocol order mode. Greater than 13 - enter or revise RT Bronchodilator order(s) to one equivalent RT bronchodilator order with every 4 hours Frequency and an Albuterol order with Frequency of every 2 hours PRN wheezing or increased work of breathing using Per Protocol order mode.      =    Electronically signed by Edenilson Hunter RCP on 6/28/2022 at 7:44 AM

## 2022-06-28 NOTE — ANESTHESIA PRE PROCEDURE
Department of Anesthesiology  Preprocedure Note       Name:  Fransico Current   Age:  67 y.o.  :  1950                                          MRN:  [id number]         Date:  2022      Surgeon: Rohini Sepulveda):  Taras Rodriguez MD    Procedure: Procedure(s):  EGD W/ANES. Medications prior to admission:   Prior to Admission medications    Medication Sig Start Date End Date Taking?  Authorizing Provider   Probiotic Product (PROBIOTIC DAILY PO) Take 1 capsule by mouth daily Over the counter   Yes Historical Provider, MD   allopurinol (ZYLOPRIM) 300 MG tablet TAKE ONE TABLET BY MOUTH DAILY 22   MO Rush - CNP   pantoprazole (PROTONIX) 40 MG tablet Take 40 mg by mouth in the morning and at bedtime     Historical Provider, MD   apixaban (ELIQUIS) 5 MG TABS tablet Take by mouth 2 times daily    Historical Provider, MD   venlafaxine (EFFEXOR) 37.5 MG tablet Take 37.5 mg by mouth    Historical Provider, MD   midodrine (PROAMATINE) 5 MG tablet Take 5 mg by mouth in the morning and at bedtime    Historical Provider, MD   nadolol (CORGARD) 20 MG tablet Take 20 mg by mouth daily    Historical Provider, MD   potassium chloride (KLOR-CON M) 20 MEQ TBCR extended release tablet Take 20 mEq by mouth daily (with breakfast)    Historical Provider, MD   budesonide-formoterol (SYMBICORT) 160-4.5 MCG/ACT AERO INHALE 1 PUFF BY MOUTH TWICE DAILY 22   Jorge Harden MD   busPIRone (BUSPAR) 10 MG tablet Take 1 tablet by mouth twice daily 22   Jorge Harden MD   torsemide (DEMADEX) 20 MG tablet Take 0.5 tablets by mouth daily 10/12/21   Jorge Harden MD   albuterol sulfate  (90 Base) MCG/ACT inhaler INHALE 2 PUFFS BY MOUTH EVERY 6 HOURS AS NEEDED FOR WHEEZING 21   Jorge Harden MD   dapagliflozin (FARXIGA) 5 MG tablet Take 5 mg by mouth every morning    Historical Provider, MD   ferrous sulfate (IRON 325) 325 (65 Fe) MG tablet Take 1 tab with breakfast  6/10/21   Karina Ellsworth Annette Sarmiento MD   ipratropium-albuterol (DUONEB) 0.5-2.5 (3) MG/3ML SOLN nebulizer solution Inhale 3 mLs into the lungs every 4 hours 3/16/20   Nubia Aragon MD   Cholecalciferol (VITAMIN D3) 50 MCG (2000 UT) CAPS Take 1 capsule by mouth daily    Historical Provider, MD   Coenzyme Q10 (COQ10) 100 MG CAPS Take 1 capsule by mouth daily    Historical Provider, MD   MYRBETRIQ 50 MG TB24 Take 50 mg by mouth daily  7/31/19   Historical Provider, MD   vitamin B-12 (CYANOCOBALAMIN) 1000 MCG tablet Take 1 tablet by mouth daily 10/31/17   Nubia Aragon MD       Current medications:    Current Facility-Administered Medications   Medication Dose Route Frequency Provider Last Rate Last Admin    pantoprazole (PROTONIX) injection 40 mg  40 mg IntraVENous BID Gilbert Cabrera MD   40 mg at 06/28/22 0810    torsemide (DEMADEX) tablet 20 mg  20 mg Oral Daily Margret Matthews MD   20 mg at 06/27/22 1100    lactulose (CHRONULAC) 10 GM/15ML solution 10 g  10 g Oral TID RAJAT Hager   10 g at 06/27/22 2026    octreotide (SANDOSTATIN) 500 mcg in sodium chloride 0.9 % 100 mL infusion  50 mcg/hr IntraVENous Continuous Gilbert Cabrera MD 10 mL/hr at 06/27/22 1535 50 mcg/hr at 06/27/22 1535    Vitamin D (CHOLECALCIFEROL) tablet 2,000 Units  2,000 Units Oral Daily Perez Tsai MD   2,000 Units at 06/28/22 0810    nadolol (CORGARD) tablet 20 mg  20 mg Oral Daily Margret Matthews MD   20 mg at 06/27/22 0901    lactobacillus (CULTURELLE) capsule 1 capsule  1 capsule Oral Daily Perez Tsai MD   1 capsule at 06/28/22 0810    vitamin B-12 (CYANOCOBALAMIN) tablet 1,000 mcg  1,000 mcg Oral Daily Perez Tsai MD   1,000 mcg at 06/28/22 0810    sodium chloride flush 0.9 % injection 5-40 mL  5-40 mL IntraVENous 2 times per day Perez Tsai MD   10 mL at 06/28/22 0811    sodium chloride flush 0.9 % injection 5-40 mL  5-40 mL IntraVENous PRN Perez Tsai MD        0.9 % sodium chloride infusion   IntraVENous PRN Nik Allen MD        ondansetron (ZOFRAN-ODT) disintegrating tablet 4 mg  4 mg Oral Q8H PRN Nik Allen MD        Or    ondansetron [facility]) injection 4 mg  4 mg IntraVENous Q6H PRN Nik Allen MD        polyethylene glycol [facility]) packet 17 g  17 g Oral Daily PRN Nik Allen MD        acetaminophen (TYLENOL) tablet 650 mg  650 mg Oral Q6H PRN Nik Allen MD        Or    acetaminophen (TYLENOL) suppository 650 mg  650 mg Rectal Q6H PRN Nik Allen MD        potassium chloride (KLOR-CON M) extended release tablet 40 mEq  40 mEq Oral PRN Nik Allen MD   40 mEq at 06/25/22 0501    Or    potassium bicarb-citric acid (EFFER-K) effervescent tablet 40 mEq  40 mEq Oral PRN Nik Allen MD        Or    potassium chloride 10 mEq/100 mL IVPB (Peripheral Line)  10 mEq IntraVENous PRN Nik Allen MD        magnesium sulfate 2000 mg in 50 mL IVPB premix  2,000 mg IntraVENous PRN Nik Allen MD   Stopped at 06/25/22 0732    budesonide-formoterol (SYMBICORT) 160-4.5 MCG/ACT inhaler 1 puff  1 puff Inhalation BID Janet Lyon MD   1 puff at 06/28/22 0741    albuterol sulfate HFA (PROVENTIL;VENTOLIN;PROAIR) 108 (90 Base) MCG/ACT inhaler 2 puff  2 puff Inhalation Q4H PRN Janet Lyon MD        ipratropium-albuterol (DUONEB) nebulizer solution 3 mL  1 vial Inhalation BID Janet Lyon MD   3 mL at 06/28/22 0741    midodrine (PROAMATINE) tablet 10 mg  10 mg Oral TID WC Ismael Gunter MD   10 mg at 06/28/22 0810    allopurinol (ZYLOPRIM) tablet 100 mg  100 mg Oral Daily Ismael Gunter MD   100 mg at 06/28/22 0810       Allergies: Allergies   Allergen Reactions    Lisinopril Other (See Comments)     angioedema is what he had.   See 12/ 2017 ER visit and [facility] hospitalization    Penicillins Hives    Sulfa Antibiotics Hives    Tetanus Toxoids Hives       Problem List:    Patient Active Problem List   Diagnosis Code    Memory impairment R41.3    Coronary artery disease involving native heart without angina pectoris I25.10    Hypertension I10    Paroxysmal atrial fibrillation (HCC) I48.0    LINSEY (non-compliant with home CPAP/BiPAP) G47.33    Chronic gout without tophus M1A. 9XX0    COPD (chronic obstructive pulmonary disease) (HCC) J44.9    GERD (gastroesophageal reflux disease) K21.9    HLD (hyperlipidemia) E78.5    Hx bowel perforation K63.1    Former smoker Z87.891    Hx ventral hernia K43.9    Anticoagulated on Eliquis Z79.01    Alcoholism ([facility] [address] F10.20    Hypomagnesemia E83.42    Lactic acidosis E87.2    Obesity (BMI 30-39. 9) E66.9    Acute diastolic congestive heart failure (HCC) I50.31    Chronic diastolic congestive heart failure (HCC) I50.32    Cirrhosis ([facility] [address] K74.60    Hepatic encephalopathy (HCC) K72.90       Past Medical History:        Diagnosis Date    Acid reflux     Acute alcohol intoxication ([facility] [address] 11/20/2017    Acute respiratory failure with hypoxemia (HCC) 11/20/2017    Alcohol withdrawal ([facility] [address] 11/14/2009    Anxiety     CAD (coronary artery disease)     COPD (chronic obstructive pulmonary disease) (HCC)     Hyperlipidemia     Hypertension     LINSEY (obstructive sleep apnea)     PTSD (post-traumatic stress disorder)        Past Surgical History:        Procedure Laterality Date    CARDIAC SURGERY      cabg    CATARACT REMOVAL WITH IMPLANT Left 08/02/2016    PHACO EMULSIFICATION OF CATARACT WITH INTRAOCULAR LENS    CATARACT REMOVAL WITH IMPLANT Right 08/22/2016    COLONOSCOPY  2011    normal    COLONOSCOPY  12/15/2016    COLONOSCOPY  12/01/2021    COLONOSCOPY N/A 12/1/2021    COLONOSCOPY WITH BIOPSY performed by Suzanna Santizo MD at [address] [facility] [address] NEEDLE BIOPSY LIVER PERCUTANEOUS  1/17/2022    CT NEEDLE BIOPSY LIVER PERCUTANEOUS 1/17/2022 [facility] CT SCAN    EYE SURGERY      cataract bilateral    HERNIA REPAIR      umbilical    KNEE ARTHROSCOPY      UPPER GASTROINTESTINAL ENDOSCOPY  2021    UPPER GASTROINTESTINAL ENDOSCOPY N/A 2021    EGD W/ANES. (10:45) performed by Gwendolyn Nath MD at [facility] 19 History:    Social History     Tobacco Use    Smoking status: Former Smoker     Packs/day: 2.00     Years: 40.00     Pack years: 80.00     Types: Cigarettes     Quit date: 2002     Years since quittin.5    Smokeless tobacco: Current User     Types: Snuff    Tobacco comment: 2016 placed in avs   Substance Use Topics    Alcohol use: Yes     Alcohol/week: 10.0 - 12.0 standard drinks     Types: 10 - 12 Shots of liquor per week     Comment: 10-12 shots / day                                Ready to quit: Not Answered  Counseling given: Not Answered  Comment: 2016 placed in avs      Vital Signs (Current):   Vitals:    22 0101 22 0741 22 0743 22 0809   BP: (!) 96/58 (!) 85/48  (!) 87/49   Pulse: 58 58 64    Resp: 18 18 18    Temp: 97.7 °F (36.5 °C) 97.1 °F (36.2 °C)     TempSrc: Oral Oral     SpO2: 96% 97% 95%    Weight: 228 lb 11.2 oz (103.7 kg)      Height:                                                  BP Readings from Last 3 Encounters:   22 (!) 87/49   22 104/62   22 125/72       NPO Status:  MN+, SEE MAR                                                                               BMI:   Wt Readings from Last 3 Encounters:   22 228 lb 11.2 oz (103.7 kg)   22 207 lb (93.9 kg)   22 205 lb (93 kg)     Body mass index is 31.02 kg/m².     CBC:   Lab Results   Component Value Date    WBC 4.1 2022    RBC 2.87 2022    HGB 8.2 2022    HCT 26.1 2022    MCV 90.8 2022    RDW 18.4 2022     2022       CMP:   Lab Results   Component Value Date     2022    K 4.4 2022    K 3.1 2022    CL 93 2022    CO2 30 2022    BUN 17 2022    CREATININE 1.4 06/28/2022    GFRAA >60 06/28/2022    GFRAA >60 01/07/2013    AGRATIO 1.0 06/25/2022    LABGLOM 50 06/28/2022    GLUCOSE 94 06/28/2022    PROT 4.9 06/25/2022    PROT 7.8 01/07/2013    CALCIUM 8.7 06/28/2022    BILITOT 3.5 06/25/2022    ALKPHOS 120 06/25/2022     06/25/2022    ALT 29 06/25/2022       POC Tests:   Recent Labs     06/26/22  0158   POCGLU 91       Coags:   Lab Results   Component Value Date    PROTIME 18.2 06/27/2022    INR 1.53 06/27/2022    APTT 45.0 06/25/2022       HCG (If Applicable): No results found for: PREGTESTUR, PREGSERUM, HCG, HCGQUANT     ABGs: No results found for: PHART, PO2ART, ZQT8GRM, WRW4DVI, BEART, G0HJDCPB     Type & Screen (If Applicable):  No results found for: LABABO, LABRH    Drug/Infectious Status (If Applicable):  No results found for: HIV, HEPCAB    COVID-19 Screening (If Applicable):   Lab Results   Component Value Date    COVID19 NEGATIVE 06/24/2022           Anesthesia Evaluation  Patient summary reviewed and Nursing notes reviewed no history of anesthetic complications:   Airway: Mallampati: II  TM distance: >3 FB   Neck ROM: full  Mouth opening: > = 3 FB   Dental:    (+) lower dentures and partials      Pulmonary: breath sounds clear to auscultation  (+) COPD (NO O2 REQ. , DAILY AND PRN INHALERS):  shortness of breath:  sleep apnea: on CPAP and noncompliant,      (-) asthma and not a current smoker          Patient did not smoke on day of surgery.                  Cardiovascular:    (+) hypertension:, CAD: obstructive, CABG/stent (CABG, 4, 2006):, dysrhythmias (PAF): atrial fibrillation, CHF: diastolic, SOLIMAN:, hyperlipidemia    (-) pacemaker, past MI and  angina    ECG reviewed  Rhythm: regular  Rate: normal           Beta Blocker:  Dose within 24 Hrs         Neuro/Psych:   (+) neuromuscular disease (GEN WEAKNESS):, psychiatric history (PTSD / ETOH USE/ABUSE):depression/anxiety  (ANX.)   (-) seizures, TIA and CVA           GI/Hepatic/Renal:   (+) GERD:, liver disease (CIRRHOSIS): portal hypertension, esophageal varices, renal disease: CRI,      (-) bowel prep and no morbid obesity       Endo/Other:    (+) blood dyscrasia (ELIQUIS): anticoagulation therapy and anemia, arthritis (AND GOUT):., no malignancy/cancer. (-) diabetes mellitus, hypothyroidism, hyperthyroidism, no malignancy/cancer               Abdominal:       Abdomen: soft. Vascular: Other Findings:           Anesthesia Plan      TIVA     ASA 3     (DNR SUSPENDED)  Induction: intravenous. Anesthetic plan and risks discussed with patient. Plan discussed with CRNA. This pre-anesthesia assessment may be used as a history and physical.    DOS STAFF ADDENDUM:    Pt seen and examined, chart reviewed (including anesthesia, drug and allergy history). No interval changes to history and physical examination. Anesthetic plan, risks, benefits, alternatives, and personnel involved discussed with patient. Patient verbalized an understanding and agrees to proceed.       Joycelyn Grider MD  June 28, 2022  11:49 AM      Joycelyn Grider MD   6/28/2022

## 2022-06-28 NOTE — PROGRESS NOTES
[facility]  Nephrology follow-up note           Reason for Consult: SEAN  Requesting Physician:  Dr. Annemarie Virk history  Patient  feels better  EGD on 6/28  Blood pressure soft  Received torsemide 1 dose on 6/27  Para 1.6l on 6/26    ROS: No chest pain/shortness of breath/fever/nausea/vomiting  PSFH: + visitor    Scheduled Meds:   pantoprazole  40 mg IntraVENous BID    torsemide  20 mg Oral Daily    lactulose  10 g Oral TID    Vitamin D  2,000 Units Oral Daily    nadolol  20 mg Oral Daily    lactobacillus  1 capsule Oral Daily    vitamin B-12  1,000 mcg Oral Daily    sodium chloride flush  5-40 mL IntraVENous 2 times per day    budesonide-formoterol  1 puff Inhalation BID    ipratropium-albuterol  1 vial Inhalation BID    midodrine  10 mg Oral TID WC    allopurinol  100 mg Oral Daily     Continuous Infusions:   octreotide (SandoSTATIN) infusion 50 mcg/hr (06/27/22 1535)    sodium chloride       PRN Meds:.sodium chloride flush, sodium chloride, ondansetron **OR** ondansetron, polyethylene glycol, acetaminophen **OR** acetaminophen, potassium chloride **OR** potassium alternative oral replacement **OR** potassium chloride, magnesium sulfate, albuterol sulfate HFA    History of Present Illness on 6/25/2022:    67 y.o. yo male with PMH of CAD status post CABG, COPD, hypertension, LINSEY, alcoholic cirrhosis who is admitted for SEAN, increasing ascites and lethargy  Patient fell at home on Thursday and needed 2 person to get him up; will continue to be weak and not able to do his usual activities and started getting more lethargic as well. It is noted by the wife that his abdomen was getting bigger as well.   Patient was recently seen in GI clinic and was maintained on diuretics  As the symptoms persisted, patient was taken to outside hospital ER and has been transferred to [facility] last night  Creatinine has increased to 2.5 and nephrology has been consulted  Upon chart review, baseline creatinine seems to be around the low ones; creatinine was 1.5 on 6/22    Physical exam:   Constitutional:  VITALS:  BP (!) 87/49   Pulse 64   Temp 97.1 °F (36.2 °C) (Oral)   Resp 18   Ht 6' (1.829 m)   Wt 228 lb 11.2 oz (103.7 kg)   SpO2 95%   BMI 31.02 kg/m²   Gen: Awake alert   neck: No JVD  Skin: Jaundiced  Cardiovascular:  S1, S2 without m/r/g   Respiratory: Creased breath sounds at the bases  Abdomen:  soft, nt, nd,   Extremities: 1+ lower extremity edema  Neuro/Psy: AAoriented times 2-3 ; moves all 4 ext    Data/  Recent Labs     06/26/22  0433 06/26/22  1344 06/26/22  1946 06/27/22 0446 06/28/22  0513   WBC 3.7*  --   --  3.6* 4.1   HGB 6.3*   < > 7.7* 7.8* 8.2*   HCT 19.5*   < > 23.7* 24.6* 26.1*   MCV 87.6  --   --  89.7 90.8   PLT 99*  --   --  94* 101*    < > = values in this interval not displayed. Recent Labs     06/26/22  0433 06/27/22 0446 06/28/22  0513   * 132* 131*   K 3.3* 4.3 4.4   CL 95* 94* 93*   CO2 27 28 30   GLUCOSE 89 100* 94   PHOS 3.4 2.7 2.4*   MG 1.80 1.70* 2.00   BUN 26* 20 17   CREATININE 1.7* 1.3 1.4*   LABGLOM 40* 54* 50*   GFRAA 48* >60 >60     Chest x-ray clear  UA bland  Urine sodium less than 20, creatinine 143    Assessment  -SEAN in the setting of liver cirrhosis, hypotension, anemia, diuretic use with bland UA. Most likely is prerenal in etiology.   Not likely to be HRS    -Hypokalemia    -Hypomagnesemia    -Hypotension on midodrine    -Anemia and thrombocytopenia   Platelet count likely related to cirrhosis of liver   Defer to GI   Eliquis on hold   1 PRBC on 5/70    -Alcoholic liver cirrhosis with hepatic encephalopathy and ascites   Para on 6/26 for 1.6l    Plan  -cont Midodrine 10 mg 3 times daily  - torsemide 20 mg daily, hold for systolic blood pressure less than 100  -Hold Farxiga and antihypertensives  -Encourage p.o. after EGD on 6/27  -Hold nadolol for systolic blood pressure less than 110  - allopurinol to 100 mg daily [decreased on

## 2022-06-28 NOTE — PROGRESS NOTES
Hospitalist Progress Note      PCP: Sergio Harp MD    Date of Admission: 6/24/2022    Chief Complaint: hepatic encephalopathy, fall at home. Subjective:     More awake and alert. Denies new complaint today. 1.7L of ascitic fluid removed with paracentesis on 6/26/2022      Plan for EGD this afternoon. Medications:  Reviewed    Infusion Medications    sodium chloride       Scheduled Medications    pantoprazole  40 mg IntraVENous BID    torsemide  20 mg Oral Daily    lactulose  10 g Oral TID    Vitamin D  2,000 Units Oral Daily    nadolol  20 mg Oral Daily    lactobacillus  1 capsule Oral Daily    vitamin B-12  1,000 mcg Oral Daily    sodium chloride flush  5-40 mL IntraVENous 2 times per day    budesonide-formoterol  1 puff Inhalation BID    ipratropium-albuterol  1 vial Inhalation BID    midodrine  10 mg Oral TID WC    allopurinol  100 mg Oral Daily     PRN Meds: sodium chloride flush, sodium chloride, ondansetron **OR** ondansetron, polyethylene glycol, acetaminophen **OR** acetaminophen, potassium chloride **OR** potassium alternative oral replacement **OR** potassium chloride, magnesium sulfate, albuterol sulfate HFA      Intake/Output Summary (Last 24 hours) at 6/28/2022 1439  Last data filed at 6/28/2022 0546  Gross per 24 hour   Intake --   Output 900 ml   Net -900 ml       Physical Exam Performed:    BP (!) 99/53   Pulse 60   Temp 96.8 °F (36 °C) (Oral)   Resp 16   Ht 6' (1.829 m)   Wt 228 lb 11.2 oz (103.7 kg)   SpO2 97%   BMI 31.02 kg/m²     General appearance: No apparent distress, appears stated age and cooperative. HEENT: Pupils equal, round, and reactive to light. Conjunctivae/corneas clear. Neck: Supple, with full range of motion. No jugular venous distention. Trachea midline. Respiratory:  Normal respiratory effort. Clear to auscultation, bilaterally without Rales/Wheezes/Rhonchi.   Cardiovascular: Regular rate and rhythm with normal S1/S2 without murmurs, rubs or gallops. Abdomen: Soft, non-tender, non-distended with normal bowel sounds. Musculoskeletal: No clubbing, cyanosis or edema bilaterally. Full range of motion without deformity. Skin: Skin color, texture, turgor normal.  No rashes or lesions. Neurologic:  Neurovascularly intact without any focal sensory/motor deficits. Cranial nerves: II-XII intact, grossly non-focal.  Psychiatric: Alert and oriented, thought content appropriate, normal insight  Capillary Refill: Brisk,3 seconds, normal   Peripheral Pulses: +2 palpable, equal bilaterally       Labs:   Recent Labs     06/26/22  0433 06/26/22  1344 06/26/22  1946 06/27/22  0446 06/28/22  0513   WBC 3.7*  --   --  3.6* 4.1   HGB 6.3*   < > 7.7* 7.8* 8.2*   HCT 19.5*   < > 23.7* 24.6* 26.1*   PLT 99*  --   --  94* 101*    < > = values in this interval not displayed. Recent Labs     06/26/22  0433 06/27/22 0446 06/28/22  0513   * 132* 131*   K 3.3* 4.3 4.4   CL 95* 94* 93*   CO2 27 28 30   BUN 26* 20 17   CREATININE 1.7* 1.3 1.4*   CALCIUM 8.6 8.8 8.7   PHOS 3.4 2.7 2.4*     No results for input(s): AST, ALT, BILIDIR, BILITOT, ALKPHOS in the last 72 hours. Recent Labs     06/26/22  0804 06/27/22 0446   INR 1.76* 1.53*     No results for input(s): Curlie Lat in the last 72 hours. Urinalysis:      Lab Results   Component Value Date    NITRU Negative 02/22/2018    WBCUA 0-2 11/20/2017    BACTERIA RARE 06/24/2022    RBCUA 20-50 11/20/2017    BLOODU Negative 02/22/2018    SPECGRAV 1.020 06/24/2022    GLUCOSEU 100 06/24/2022       Radiology:  US GUIDED PARACENTESIS   Final Result   Successful paracentesis. US LIVER   Final Result   1. Small to moderate ascites potentially due to portal hypertension. 2. Limited visualization of the liver with cirrhotic changes.                  Assessment/Plan:    Active Hospital Problems    Diagnosis     Hepatic encephalopathy ([facility] [address] [K72.90]      Priority: Medium    Cirrhosis ([facility] [address] [K74.60]      Priority: Medium        Acute encephalopathy - suspect metabolic  Probable Hepatic Encephalopathy. Ammonia elevated. Started lactulose - +BM x3 and mental status improved. Titrate lactulose to 2-4 sfot BM daily.          Decompensated Alcoholic Liver Cirrhosis. Per recent biopsy results stage 4-4 disease at baseline. Continued alcohol intake despite the diagnosis. GI consult. Paracentesis 1.7L removed 6/26  Eliquis held (last dose Thursday per wife's report).   - EGD planned today. Anemia of acute to subacute blood loss. Started Octreotide with hx of esophageal varices - plan 72hr course - should be completed by tomorrow. PPI IV  GI involved - EGD today   FOBT positive. Hgb 6.3 - pRBC 6/26 - repeat Hgb post transfusion 6.9 inappropriate response - additional pRBC ordered - Hgb up to 7.7 and 7.8 - appears HGB is now stable.          Hypotension due to advanced liver disease  Midodrine. Albumin. Gentle IVF per nephrology - now stopped. Once PO intake stable and procedure plans finalized, will need to resume diuretics - he is accumulating fluid fast.    - I am not sure if he will be diuretic responsive now given advanced liver disease.           SEAN - can not rule out hepatorenal process. Nephrology consult. Med changes as above.           K and Mg supplement. Daily Labs.            DVT Prophylaxis: SCD, eliquis on hold for paracentesis and also with concerns for ABLA.          Diet: Diet NPO Exceptions are: Ice Chips, Sips of Water with Meds  Code Status: full code - discussed with wife - she wants to talk to her sister. She thinks he would not want to be on life support.  Though no code status changes made today.        Dispo - inpatient.        Dar Stevenson MD

## 2022-06-28 NOTE — CARE COORDINATION
INTERDISCIPLINARY PLAN OF CARE CONFERENCE    Date/Time: 6/28/2022 8:51 AM  Completed by: Evelio Gonzalez RN, Case Management      Patient Name:  Rosa Hendrickson  YOB: 1950  Admitting Diagnosis: Cirrhosis ([address] [P37.38]     Admit Date/Time:  6/24/2022  9:19 PM    Chart reviewed. Interdisciplinary team contacted or reviewed plan related to patient progress and discharge plans. Disciplines included Case Management, Nursing, and Dietitian. Current Status:ongoing  PT/OT recommendation for discharge plan of care: SNF--AMPAC 15    Expected D/C Disposition:  Skilled nursing facility  Confirmed plan with patient and/or family Yes confirmed with: (name) pt and wife  Met with:pt and wife  Discharge Plan Comments: Reviewed chart. Role of discharge planner explained and patient and wife verbalized understanding. Pt is from home with wife. Pt and wife are aware that PT/OT recommend SNF. AMPAC of 15. CM discussed it family wanted HomeCare verses [facility] (SNF) , as CM discussed benefits of both. Pt and wife state that they wants SNF and wants [address] [facility]) for SNF. Wife is not with pt 24/7. CM called [facility] and made referral and CM faxed all info to [facility] to [phone]. Nany Shiv will let CM know if able to accept. No precert is needed. Pt will need a rapid covid test at d/c. Pt to have a EGD today. Addendum 1333: Olla Don with [facility] states she can accept pt. No precert needed. Pt will need a rapid covid at d/c.        Home O2 in place on admit: No  Pt informed of need to bring portable home O2 tank on day of discharge for nursing to connect prior to leaving:  Not Indicated  Verbalized agreement/Understanding:  Not Indicated

## 2022-06-28 NOTE — FLOWSHEET NOTE
06/28/22 0741   Vital Signs   Temp 97.1 °F (36.2 °C)   Temp Source Oral   Heart Rate 58   Heart Rate Source Monitor   Resp 18   BP (!) 85/48   BP Location Right Arm   BP Method Automatic   MAP (Calculated) 60.33   Patient Position Semi fowlers   Level of Consciousness Alert (0)   MEWS Score 2   Oxygen Therapy   SpO2 97 %   O2 Device None (Room air)     Patient resting quietly in bed. No s/s of distress noted. NPO for EGD today. BP recheck 87/49. Torsemide and nadolol held this AM for SBP 80s. AM midodrine given. Shift assessment complete, see flow sheet. Denies needs at this time. Call light in reach. Will monitor.

## 2022-06-28 NOTE — PLAN OF CARE
Problem: Safety - Adult  Goal: Free from fall injury  Outcome: Progressing  Flowsheets (Taken 6/27/2022 1147 by Linnette Yusuf, RN)  Free From Fall Injury: Instruct family/caregiver on patient safety     Problem: ABCDS Injury Assessment  Goal: Absence of physical injury  Outcome: Progressing  Flowsheets (Taken 6/27/2022 1147 by Linnette Yusuf RN)  Absence of Physical Injury: Implement safety measures based on patient assessment     Problem: Chronic Conditions and Co-morbidities  Goal: Patient's chronic conditions and co-morbidity symptoms are monitored and maintained or improved  Outcome: Progressing

## 2022-06-28 NOTE — ANESTHESIA POSTPROCEDURE EVALUATION
Department of Anesthesiology  Postprocedure Note    Patient: Cami Bliss  MRN: [id number]  YOB: 1950  Date of evaluation: 6/28/2022      Procedure Summary     Date: 06/28/22 Room / Location: [address] [place] [address]    Anesthesia Start: 0441 Anesthesia Stop: 6473    Procedure: EGD W/ANES. (N/A ) Diagnosis:       Abdominal pain, unspecified abdominal location      (ABDOMINAL PAIN)    Surgeons: Elena Connelly MD Responsible Provider: Cisco Leggett MD    Anesthesia Type: TIVA ASA Status: 3          Anesthesia Type: No value filed.     Nikita Phase I: Nikita Score: 10    Nikita Phase II: Nikita Score: 10    Vitals:    06/28/22 0743 06/28/22 0809 06/28/22 1156 06/28/22 1250   BP:  (!) 87/49 (!) 97/57 (!) 105/52   Pulse: 64  59 68   Resp: 18  16 14   Temp:   97.6 °F (36.4 °C) 98 °F (36.7 °C)   TempSrc:   Tympanic    SpO2: 95%  97% 93%   Weight:       Height:         Anesthesia Post Evaluation    Patient location during evaluation: bedside  Patient participation: complete - patient participated  Level of consciousness: awake and alert  Airway patency: patent  Nausea & Vomiting: no nausea  Complications: no  Cardiovascular status: hemodynamically stable and blood pressure returned to baseline  Respiratory status: acceptable  Hydration status: euvolemic

## 2022-06-28 NOTE — PROGRESS NOTES
Pt sitting up in bed this evening. Pt A&O x 4, VSS, BP soft. Shift assessment complete and all meds given per MAR. Pt reminded of NPO status at midnight tonight for EGD tomorrow. Beverage provided. Bed in lowest position, call light within reach with all personal belongings. Bed alarm in place. All patient needs addressed.

## 2022-06-28 NOTE — H&P
History and Physical / Pre-Sedation Assessment    Patient:  Veronica Blackman   :   1950     Intended Procedure:  EGD    HPI: 67year old male with a history of HLD, HTN, CAD s/p CABG, COPD, CHF, alcohol abuse, and cirrhosis decompensated by esophageal varices and encephalopathy admitted with hepatic encephalopathy and anemia. Past Medical History:   Diagnosis Date    Acid reflux     Acute alcohol intoxication ([facility] 2017    Acute respiratory failure with hypoxemia (HCC) 2017    Alcohol withdrawal ([facility] 2009    Anxiety     CAD (coronary artery disease)     COPD (chronic obstructive pulmonary disease) (HCC)     Hyperlipidemia     Hypertension     LINSEY (obstructive sleep apnea)     PTSD (post-traumatic stress disorder)      Past Surgical History:   Procedure Laterality Date    CARDIAC SURGERY      cabg    CATARACT REMOVAL WITH IMPLANT Left 2016    PHACO EMULSIFICATION OF CATARACT WITH INTRAOCULAR LENS    CATARACT REMOVAL WITH IMPLANT Right 2016    COLONOSCOPY      normal    COLONOSCOPY  12/15/2016    COLONOSCOPY  2021    COLONOSCOPY N/A 2021    COLONOSCOPY WITH BIOPSY performed by Norman Garcia MD at [address] [facility] NEEDLE BIOPSY LIVER PERCUTANEOUS  2022    CT NEEDLE BIOPSY LIVER PERCUTANEOUS 2022 [facility] CT SCAN    EYE SURGERY      cataract bilateral    HERNIA REPAIR      umbilical    KNEE ARTHROSCOPY      UPPER GASTROINTESTINAL ENDOSCOPY  2021    UPPER GASTROINTESTINAL ENDOSCOPY N/A 2021    EGD W/ANES. (10:45) performed by Norman Garcia MD at [address] [facility]       Medications reviewed  Prior to Admission medications    Medication Sig Start Date End Date Taking?  Authorizing Provider   Probiotic Product (PROBIOTIC DAILY PO) Take 1 capsule by mouth daily Over the counter   Yes Historical Provider, MD   allopurinol (ZYLOPRIM) 300 MG tablet TAKE ONE TABLET BY MOUTH DAILY 22   Beverly Callaway Karen Tillman, APRN - CNP   pantoprazole (PROTONIX) 40 MG tablet Take 40 mg by mouth in the morning and at bedtime     Historical Provider, MD   apixaban (ELIQUIS) 5 MG TABS tablet Take by mouth 2 times daily    Historical Provider, MD   venlafaxine (EFFEXOR) 37.5 MG tablet Take 37.5 mg by mouth    Historical Provider, MD   midodrine (PROAMATINE) 5 MG tablet Take 5 mg by mouth in the morning and at bedtime    Historical Provider, MD   nadolol (CORGARD) 20 MG tablet Take 20 mg by mouth daily    Historical Provider, MD   potassium chloride (KLOR-CON M) 20 MEQ TBCR extended release tablet Take 20 mEq by mouth daily (with breakfast)    Historical Provider, MD   budesonide-formoterol (SYMBICORT) 160-4.5 MCG/ACT AERO INHALE 1 PUFF BY MOUTH TWICE DAILY 4/12/22   Jose Tidwell MD   busPIRone (BUSPAR) 10 MG tablet Take 1 tablet by mouth twice daily 1/13/22   Jose Tidwell MD   torsemide [facility]) 20 MG tablet Take 0.5 tablets by mouth daily 10/12/21   Jose Tidwell MD   albuterol sulfate  (90 Base) MCG/ACT inhaler INHALE 2 PUFFS BY MOUTH EVERY 6 HOURS AS NEEDED FOR WHEEZING 7/1/21   Jose Tidwell MD   dapagliflozin (FARXIGA) 5 MG tablet Take 5 mg by mouth every morning    Historical Provider, MD   ferrous sulfate (IRON 325) 325 (65 Fe) MG tablet Take 1 tab with breakfast  6/10/21   Jose Tidwell MD   ipratropium-albuterol (DUONEB) 0.5-2.5 (3) MG/3ML SOLN nebulizer solution Inhale 3 mLs into the lungs every 4 hours 3/16/20   Jarrod Harp MD   Cholecalciferol (VITAMIN D3) 50 MCG (2000 UT) CAPS Take 1 capsule by mouth daily    Historical Provider, MD   Coenzyme Q10 (COQ10) 100 MG CAPS Take 1 capsule by mouth daily    Historical Provider, MD   MYRBETRIQ 50 MG TB24 Take 50 mg by mouth daily  7/31/19   Historical Provider, MD   vitamin B-12 (CYANOCOBALAMIN) 1000 MCG tablet Take 1 tablet by mouth daily 10/31/17   Jarrod Harp MD        Allergies:    Allergies   Allergen Reactions    Lisinopril Other (See Comments)     angioedema is what he had. See 12/ 2017 ER visit and [facility] hospitalization    Penicillins Hives    Sulfa Antibiotics Hives    Tetanus Toxoids Hives       Nurses notes reviewed and agreed. Physical Exam:  Vital Signs: BP (!) 97/57   Pulse 59   Temp 97.6 °F (36.4 °C) (Tympanic)   Resp 16   Ht 6' (1.829 m)   Wt 228 lb 11.2 oz (103.7 kg)   SpO2 97%   BMI 31.02 kg/m²    Airway: Mallampati: II (soft palate, uvula, fauces visible)  Pulmonary:Normal  Cardiac:Normal  Abdomen:Normal    Pre-Procedure Assessment / Plan:  ASA: Class 3 - A patient with severe systemic disease that limits activity but is not incapacitating  Level of Sedation Plan: Moderate sedation  Post Procedure plan: Return to same level of care    I assessed the patient and find that the patient is in satisfactory condition to proceed with the planned procedure and sedation plan. I have explained the risk, benefits, and alternatives to the procedure; the patient understands and agrees to proceed.        Elisabeth Wells MD  6/28/2022

## 2022-06-29 VITALS
WEIGHT: 239 LBS | SYSTOLIC BLOOD PRESSURE: 96 MMHG | RESPIRATION RATE: 18 BRPM | HEIGHT: 72 IN | OXYGEN SATURATION: 98 % | DIASTOLIC BLOOD PRESSURE: 47 MMHG | TEMPERATURE: 97.4 F | HEART RATE: 71 BPM | BODY MASS INDEX: 32.37 KG/M2

## 2022-06-29 LAB
ALBUMIN SERPL-MCNC: 2.7 G/DL (ref 3.4–5)
ANION GAP SERPL CALCULATED.3IONS-SCNC: 9 MMOL/L (ref 3–16)
BASOPHILS ABSOLUTE: 0.1 K/UL (ref 0–0.2)
BASOPHILS RELATIVE PERCENT: 1.3 %
BUN BLDV-MCNC: 17 MG/DL (ref 7–20)
CALCIUM SERPL-MCNC: 8.6 MG/DL (ref 8.3–10.6)
CHLORIDE BLD-SCNC: 95 MMOL/L (ref 99–110)
CO2: 28 MMOL/L (ref 21–32)
CREAT SERPL-MCNC: 1.4 MG/DL (ref 0.8–1.3)
EOSINOPHILS ABSOLUTE: 0.2 K/UL (ref 0–0.6)
EOSINOPHILS RELATIVE PERCENT: 4.6 %
GFR AFRICAN AMERICAN: >60
GFR NON-AFRICAN AMERICAN: 50
GLUCOSE BLD-MCNC: 83 MG/DL (ref 70–99)
HCT VFR BLD CALC: 24.1 % (ref 40.5–52.5)
HEMOGLOBIN: 7.9 G/DL (ref 13.5–17.5)
LYMPHOCYTES ABSOLUTE: 0.7 K/UL (ref 1–5.1)
LYMPHOCYTES RELATIVE PERCENT: 15 %
MAGNESIUM: 1.7 MG/DL (ref 1.8–2.4)
MCH RBC QN AUTO: 29.1 PG (ref 26–34)
MCHC RBC AUTO-ENTMCNC: 32.7 G/DL (ref 31–36)
MCV RBC AUTO: 89 FL (ref 80–100)
MONOCYTES ABSOLUTE: 1.1 K/UL (ref 0–1.3)
MONOCYTES RELATIVE PERCENT: 22.8 %
NEUTROPHILS ABSOLUTE: 2.8 K/UL (ref 1.7–7.7)
NEUTROPHILS RELATIVE PERCENT: 56.3 %
PDW BLD-RTO: 18.6 % (ref 12.4–15.4)
PHOSPHORUS: 2.2 MG/DL (ref 2.5–4.9)
PLATELET # BLD: 98 K/UL (ref 135–450)
PMV BLD AUTO: 8.1 FL (ref 5–10.5)
POTASSIUM SERPL-SCNC: 4.3 MMOL/L (ref 3.5–5.1)
RBC # BLD: 2.71 M/UL (ref 4.2–5.9)
SARS-COV-2, NAAT: NOT DETECTED
SODIUM BLD-SCNC: 132 MMOL/L (ref 136–145)
WBC # BLD: 5 K/UL (ref 4–11)

## 2022-06-29 PROCEDURE — 6360000002 HC RX W HCPCS: Performed by: INTERNAL MEDICINE

## 2022-06-29 PROCEDURE — 94640 AIRWAY INHALATION TREATMENT: CPT

## 2022-06-29 PROCEDURE — 6370000000 HC RX 637 (ALT 250 FOR IP): Performed by: INTERNAL MEDICINE

## 2022-06-29 PROCEDURE — 2580000003 HC RX 258: Performed by: INTERNAL MEDICINE

## 2022-06-29 PROCEDURE — 85025 COMPLETE CBC W/AUTO DIFF WBC: CPT

## 2022-06-29 PROCEDURE — 80069 RENAL FUNCTION PANEL: CPT

## 2022-06-29 PROCEDURE — 87635 SARS-COV-2 COVID-19 AMP PRB: CPT

## 2022-06-29 PROCEDURE — 83735 ASSAY OF MAGNESIUM: CPT

## 2022-06-29 PROCEDURE — 94761 N-INVAS EAR/PLS OXIMETRY MLT: CPT

## 2022-06-29 PROCEDURE — C9113 INJ PANTOPRAZOLE SODIUM, VIA: HCPCS | Performed by: INTERNAL MEDICINE

## 2022-06-29 PROCEDURE — 6370000000 HC RX 637 (ALT 250 FOR IP): Performed by: PHYSICIAN ASSISTANT

## 2022-06-29 PROCEDURE — 99239 HOSP IP/OBS DSCHRG MGMT >30: CPT | Performed by: INTERNAL MEDICINE

## 2022-06-29 PROCEDURE — 36415 COLL VENOUS BLD VENIPUNCTURE: CPT

## 2022-06-29 RX ORDER — TORSEMIDE 20 MG/1
20 TABLET ORAL DAILY
Qty: 30 TABLET | Refills: 0 | Status: SHIPPED | OUTPATIENT
Start: 2022-06-29

## 2022-06-29 RX ORDER — LACTULOSE 10 G/15ML
10 SOLUTION ORAL 2 TIMES DAILY
Qty: 1 EACH | Refills: 2 | Status: SHIPPED | OUTPATIENT
Start: 2022-06-29

## 2022-06-29 RX ORDER — MAGNESIUM SULFATE IN WATER 40 MG/ML
2000 INJECTION, SOLUTION INTRAVENOUS ONCE
Status: COMPLETED | OUTPATIENT
Start: 2022-06-29 | End: 2022-06-29

## 2022-06-29 RX ORDER — MIDODRINE HYDROCHLORIDE 10 MG/1
10 TABLET ORAL 3 TIMES DAILY
Qty: 90 TABLET | Refills: 1 | Status: SHIPPED | OUTPATIENT
Start: 2022-06-29

## 2022-06-29 RX ORDER — SPIRONOLACTONE 25 MG/1
25 TABLET ORAL DAILY
Qty: 30 TABLET | Refills: 3 | Status: SHIPPED | OUTPATIENT
Start: 2022-06-29

## 2022-06-29 RX ADMIN — Medication 1 PUFF: at 07:24

## 2022-06-29 RX ADMIN — Medication 1 CAPSULE: at 08:19

## 2022-06-29 RX ADMIN — LACTULOSE 10 G: 10 SOLUTION ORAL at 08:19

## 2022-06-29 RX ADMIN — PANTOPRAZOLE SODIUM 40 MG: 40 INJECTION, POWDER, FOR SOLUTION INTRAVENOUS at 08:19

## 2022-06-29 RX ADMIN — MIDODRINE HYDROCHLORIDE 10 MG: 10 TABLET ORAL at 08:19

## 2022-06-29 RX ADMIN — Medication 10 ML: at 08:19

## 2022-06-29 RX ADMIN — Medication 2000 UNITS: at 08:19

## 2022-06-29 RX ADMIN — MAGNESIUM SULFATE HEPTAHYDRATE 2000 MG: 40 INJECTION, SOLUTION INTRAVENOUS at 08:26

## 2022-06-29 RX ADMIN — ALLOPURINOL 100 MG: 100 TABLET ORAL at 08:19

## 2022-06-29 RX ADMIN — CYANOCOBALAMIN TAB 500 MCG 1000 MCG: 500 TAB at 08:19

## 2022-06-29 RX ADMIN — IPRATROPIUM BROMIDE AND ALBUTEROL SULFATE 3 ML: 2.5; .5 SOLUTION RESPIRATORY (INHALATION) at 07:24

## 2022-06-29 RX ADMIN — MIDODRINE HYDROCHLORIDE 10 MG: 10 TABLET ORAL at 12:17

## 2022-06-29 NOTE — PROGRESS NOTES
Report given to VANE Marie at patient bedside. Pt is stable, call light in reach, with no needs at this time. Care transferred.      Sherry Hughes RN

## 2022-06-29 NOTE — CARE COORDINATION
DISCHARGE ORDER  Date/Time 2022 10:10 AM  Completed by: Merary Zamora RN, Case Management    Patient Name: Jair Haddad    : 1950      Admit order Date and Status:2022  Noted discharge order. (verify MD's last order for status of admission/Traditional Medicare 3 [place] Inpatient qualifying stay required for SNF)    Confirmed discharge plan with:              Patient:  Yes              When pt confirms DC plan does any support person need to be contacted by CM Yes if yes who_____spouse, Lilian Cooper                       Discharge to Facility: [address] [place] [address]   · Facility phone number for staff giving report: Name: [facility])  · Address: [address] [place] [address], [facility]  · Phone: [phone]  · Fax: [phone] and [phone]       Pre-certification completed: not needed   Hospital Exemption Notification (HENS) completed: yes   Discharge orders and Continuity of Care faxed to facility:  [phone]      Transportation:               Medical Transport explained with choice list offered to pt/family. Choice:Yes(no preference)  Agency used: [address] [place] [address] Rd up time:   2695-3149      Pt/family/Nursing/Facility aware of  time: yes  Yes Names: pt, Lilian [place] (wife), Holly Odonnell with [facility]. Ambulance form completed:  yes:      Date Last IMM Given: 2022--per pt and spouse, pt is ready for d/c. Comments:Faxed RACHEL/AVS to [facility] to [phone]. Rapid covid for SNF ordered and VANE Dietz, informed. REN spoke with Dr. Esvin Valentine (neph), and he states that he will not be able to see pt before pt is discharged, however he is agreeable with pt to be discharged to [facility] ([facility]) today. CM informed [place], pt, and pt's wife of this. Pt is being d/c'd to [facility] today. Pt's O2 sats are 98% on RA.     Discharge timeout done with Sandra Garvin RN. All discharge needs and concerns addressed. Discharging nurse to complete RACHEL, reconcile AVS, and place final copy with patient's discharge packet. Discharging RN to ensure that written prescriptions for  Level II medications are sent with patient to the facility as per protocol.

## 2022-06-29 NOTE — PROGRESS NOTES
Vitals:    06/29/22 0128   BP: (!) 99/40   Pulse: 66   Resp: 16   Temp: 97 °F (36.1 °C)   SpO2: 98%     Updated vitals as shown, patient resting in bed with no needs expressed at this time.     Kristy Marks RN

## 2022-06-29 NOTE — DISCHARGE INSTR - COC
Continuity of Care Form    Patient Name: Kalani Heller   :  1950  MRN:  [id number]    [facility] date:  2022  Discharge date:  2022    Code Status Order: DNR-CCA   Advance Directives:   [facility] Documentation       Date/Time Healthcare Directive Type of Healthcare Directive Copy in [address] Agent's Name Healthcare Agent's Phone Number    22 1200 Yes, patient has an advance directive for healthcare treatment Durable power of  for health care;Living will Yes, copy in chart [address] --            Admitting Physician:  Sarai Roberson MD  PCP: Rashawn Gaviria MD    Discharging Nurse: Rubén Monreal [facility] Unit/Room#: /5833-99  Discharging Unit Phone Number: [phone]    Emergency Contact:   Extended Emergency Contact Information  Primary Emergency Contact: [facility]  POA  Address: [address] [place] [address] Phone: [phone]  Mobile Phone: [phone]  Relation: Spouse  Secondary Emergency Contact: [address] Phone: [phone]  Relation: Other    Past Surgical History:  Past Surgical History:   Procedure Laterality Date    CARDIAC SURGERY      cabg    CATARACT REMOVAL WITH IMPLANT Left 2016    PHACO EMULSIFICATION OF CATARACT WITH INTRAOCULAR LENS    CATARACT REMOVAL WITH IMPLANT Right 2016    COLONOSCOPY      normal    COLONOSCOPY  12/15/2016    COLONOSCOPY  2021    COLONOSCOPY N/A 2021    COLONOSCOPY WITH BIOPSY performed by Doni Weir MD at [address] [facility] LIVER PERCUTANEOUS  2022    CT NEEDLE BIOPSY LIVER PERCUTANEOUS 2022 [address] SCAN    EYE SURGERY      cataract bilateral    HERNIA REPAIR      umbilical    KNEE ARTHROSCOPY      UPPER GASTROINTESTINAL ENDOSCOPY  2021    UPPER GASTROINTESTINAL ENDOSCOPY N/A 2021    EGD W/ANES.  (10:45) performed by Alycia Guadalupe Aida Hardy MD at [facility] N/A 6/28/2022    EGD W/ANES. performed by Mo Swan MD at [address] [place] [address]       Immunization History:   Immunization History   Administered Date(s) Administered    COVID-19, PFIZER GRAY top, DO NOT Dilute, (age 15 y+), IM, 30 mcg/0.3 mL 01/11/2022    COVID-19, PFIZER PURPLE top, DILUTE for use, (age 15 y+), 30mcg/0.3mL 02/05/2021, 02/26/2021    Influenza 10/11/2010    Influenza Vaccine, unspecified formulation 09/13/2017    Influenza Virus Vaccine 10/05/2013, 10/15/2014, 10/29/2015    Influenza Whole 10/05/2013, 10/15/2014    Influenza, High Dose (Fluzone 65 yrs and older) 10/04/2018    Influenza, Quadv, IM, (6 mo and older Fluzone, Flulaval, Fluarix and 3 yrs and older Afluria) 09/13/2017    Influenza, Quadv, adjuvanted, 65 yrs +, IM, PF (Fluad) 10/06/2020    Pneumococcal Conjugate 13-valent (Covubra30) 10/29/2015    Pneumococcal Polysaccharide (Cjqjvwfwy51) 10/16/2018, 08/20/2019    Zoster Live (Zostavax) 11/11/2013    Zoster Recombinant (Shingrix) 12/07/2019, 02/01/2020       Active Problems:  Patient Active Problem List   Diagnosis Code    Memory impairment R41.3    Coronary artery disease involving native heart without angina pectoris I25.10    Hypertension I10    Paroxysmal atrial fibrillation (HCC) I48.0    LINSEY (non-compliant with home CPAP/BiPAP) G47.33    Chronic gout without tophus M1A. 9XX0    COPD (chronic obstructive pulmonary disease) ([facility]) J44.9    GERD (gastroesophageal reflux disease) K21.9    HLD (hyperlipidemia) E78.5    Hx bowel perforation K63.1    Former smoker Z87.891    Hx ventral hernia K43.9    Anticoagulated on Eliquis Z79.01    Alcoholism ([address] F10.20    Hypomagnesemia E83.42    Lactic acidosis E87.2    Obesity (BMI 30-39. 9) A03.7    Acute diastolic congestive heart failure (HCC) I50.31    Chronic diastolic congestive heart failure (HCC) I50.32    Cirrhosis ([facility] K74.60    Hepatic encephalopathy ([facility] K72.90       Isolation/Infection:   Isolation            No Isolation          Patient Infection Status       None to display            Nurse Assessment:  Last Vital Signs: BP (!) 96/47   Pulse 71   Temp 97.4 °F (36.3 °C) (Oral)   Resp 18   Ht 6' (1.829 m)   Wt 239 lb (108.4 kg)   SpO2 98%   BMI 32.41 kg/m²     Last documented pain score (0-10 scale): Pain Level: 0  Last Weight:   Wt Readings from Last 1 Encounters:   06/29/22 239 lb (108.4 kg)     Mental Status:  oriented, alert, and coherent    IV Access:  - None    Nursing Mobility/ADLs:  Walking   Assisted  Transfer  Assisted  Bathing  Assisted  Dressing  Assisted  Toileting  Assisted  Feeding  Independent  Med Admin  Assisted  Med Delivery   whole    Wound Care Documentation and Therapy:  Incision 01/17/22 Abdomen Lateral;Right;Upper (Active)   Number of days: 162        Elimination:  Continence: Bowel: No  Bladder: No  Urinary Catheter: None   Colostomy/Ileostomy/Ileal Conduit: No       Date of Last BM: 6/29    Intake/Output Summary (Last 24 hours) at 6/29/2022 0932  Last data filed at 6/29/2022 0646  Gross per 24 hour   Intake --   Output 900 ml   Net -900 ml     I/O last 3 completed shifts:  In: -   Out: 1200 [Urine:1200]    Safety Concerns: At Risk for Falls    Impairments/Disabilities:      None    Nutrition Therapy:  Current Nutrition Therapy:   - Oral Diet:  Low Sodium (2gm)    Routes of Feeding: Oral  Liquids: Thin Liquids  Daily Fluid Restriction: no  Last Modified Barium Swallow with Video (Video Swallowing Test): not done    Treatments at the Time of Hospital Discharge:   Respiratory Treatments:   Oxygen Therapy:  is not on home oxygen therapy.   Ventilator:    - No ventilator support    Rehab Therapies: Physical Therapy, Occupational Therapy, and N  Weight Bearing Status/Restrictions: No weight bearing restrictions  Other Medical Equipment (for information only, NOT a DME order):  walker, bedside commode, and hospital bed  Other Treatments:     Patient's personal belongings (please select all that are sent with patient):  None    RN SIGNATURE:  Electronically signed by Caryn Gasca RN on 6/29/22 at 12:14 PM EDT    CASE MANAGEMENT/SOCIAL WORK SECTION    Inpatient Status Date: 6/24/2022    Readmission Risk Assessment Score:  Readmission Risk              Risk of Unplanned Readmission:  20           Discharging to Facility/ Agency   Name: [facility]  Address: [address] [place] [address], [facility]  Phone: [phone]  Fax: [phone] and [phone]        / signature: Electronically signed by Ansley Kwong RN on 6/29/22 at 10:03 AM EDT    PHYSICIAN SECTION    Prognosis: Fair    Condition at Discharge: Stable    Rehab Potential (if transferring to Rehab): Fair    Recommended Labs or Other Treatments After Discharge:       IP Rehab ECF with PTOT. Please note med changes and new meds started this admit. Physician Certification: I certify the above information and transfer of Johnnie Huang  is necessary for the continuing treatment of the diagnosis listed and that he requires [facility] for less 30 days.      Update Admission H&P: No change in H&P    PHYSICIAN SIGNATURE:  Electronically signed by Timothy Ventura MD on 6/29/22 at 9:33 AM EDT

## 2022-06-29 NOTE — FLOWSHEET NOTE
06/29/22 0815   Vital Signs   Temp 97.4 °F (36.3 °C)   Temp Source Oral   Heart Rate 71   Resp 18   BP (!) 96/47   BP Location Right upper arm   MAP (Calculated) 63.33   Patient Position Sitting   Level of Consciousness Alert (0)   MEWS Score 2   Oxygen Therapy   SpO2 98 %   O2 Device None (Room air)   Patient sitting on edge of bed eating breakfast. No s/s of distress noted. Shift assessment complete, see flow sheet. Denies needs at this time. Call light in reach. Will monitor.

## 2022-06-29 NOTE — FLOWSHEET NOTE
Vitals:    06/28/22 2000   BP: (!) 97/41   Pulse: 64   Resp: 16   Temp: 98.5 °F (36.9 °C)   SpO2: 96%       Vitals as shown above. Shift assessment completed, see flow sheets. Patient is A&O x 4. Night meds given per mar. Pt denies pain at this time. Pt is resting with bed alarm on, call light in reach.     Kristy Marks RN

## 2022-06-29 NOTE — DISCHARGE SUMMARY
Hospital Medicine Discharge Summary    Patient ID: Luh Du      Patient's PCP: Sheeba Carranza MD    Admit Date: 6/24/2022     Discharge Date: 6/29/2022     Admitting Provider: Catalino Cunningham MD     Discharge Provider: Manny Valente MD     Discharge Diagnoses: Active Hospital Problems    Diagnosis     Hepatic encephalopathy ([facility] [address] [K72.90]      Priority: Medium    Cirrhosis ([facility] [address] [K74.60]      Priority: Medium       The patient was seen and examined on day of discharge and this discharge summary is in conjunction with any daily progress note from day of discharge. 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 Acute encephalopathy - suspect metabolic  Probable Hepatic Encephalopathy. Ammonia elevated. Started lactulose - +BM x3 and mental status improved. Titrate lactulose to 2-4 sfot BM daily - prescribed on discharge and discussed with wife at length.            Decompensated Alcoholic Liver Cirrhosis. Per biopsy (Jan 17, 2022) results stage 4-4 disease at baseline. Continued alcohol intake despite the diagnosis. GI consult. Paracentesis 1.7L removed 6/26  Eliquis held (last dose Thursday per wife's report).             - EGD confirmed esophageal varices, no active bleeding.            Anemia of acute to subacute blood loss. Started Octreotide with hx of esophageal varices - finished 72hr course  PPI IV to PO  GI involved - EGD 6/28 confirmed esophageal varices, no active bleeding  FOBT positive. Hgb 6.3 - pRBC 6/26 - repeat Hgb post transfusion 6.9 inappropriate response - additional pRBC ordered - Hgb up to 7.7 and 7.8 - appears HGB is now stable.           Hypotension due to advanced liver disease  Midodrine. Albumin. Gentle IVF per nephrology - now stopped.                - I am not sure if he will be diuretic responsive now given advanced liver disease.    - trial of torsemide and aldactone on discharge           SEAN - can not rule out hepatorenal process. Nephrology consult. Med changes as above.            K and Mg supplement. Daily Labs.             Discussed very poor overall prognosis with wife and with patient when his mental status improved. Code status again confirmed to be DNRCCA , DNI. Pt does not want artifical life support or intubation, no ACLS. He is ok with medical management of his condition otherwise. Physical Exam Performed:     BP (!) 96/47   Pulse 71   Temp 97.4 °F (36.3 °C) (Oral)   Resp 18   Ht 6' (1.829 m)   Wt 239 lb (108.4 kg)   SpO2 98%   BMI 32.41 kg/m²       General appearance: No apparent distress, appears stated age and cooperative. HEENT: Pupils equal, round, and reactive to light. Conjunctivae/corneas clear. Neck: Supple, with full range of motion. No jugular venous distention. Trachea midline. Respiratory:  Normal respiratory effort. Clear to auscultation, bilaterally without Rales/Wheezes/Rhonchi. Cardiovascular: Regular rate and rhythm with normal S1/S2 without murmurs, rubs or gallops. Abdomen: Soft, non-tender, non-distended with normal bowel sounds. Musculoskeletal: No clubbing, cyanosis or edema bilaterally. Full range of motion without deformity. Skin: Skin color, texture, turgor normal.  No rashes or lesions. Neurologic:  Neurovascularly intact without any focal sensory/motor deficits. Cranial nerves: II-XII intact, grossly non-focal.  Psychiatric: Alert and oriented, thought content appropriate, normal insight  Capillary Refill: Brisk,3 seconds, normal   Peripheral Pulses: +2 palpable, equal bilaterally          Labs:  For convenience and continuity at follow-up the following most recent labs are provided:      CBC:    Lab Results   Component Value Date    WBC 5.0 06/29/2022    HGB 7.9 06/29/2022    HCT 24.1 06/29/2022    PLT 98 06/29/2022       Renal:    Lab Results   Component Value Date     06/29/2022    K 4.3 06/29/2022    K 3.1 06/25/2022    CL 95 06/29/2022    CO2 28 06/29/2022    BUN 17 06/29/2022    CREATININE 1.4 06/29/2022    CALCIUM 8.6 06/29/2022    PHOS 2.2 06/29/2022         Significant Diagnostic Studies    Radiology:   US GUIDED PARACENTESIS   Final Result   Successful paracentesis. US LIVER   Final Result   1. Small to moderate ascites potentially due to portal hypertension. 2. Limited visualization of the liver with cirrhotic changes. Consults:     IP CONSULT TO GI  IP CONSULT TO NEPHROLOGY    Disposition:  ECF     Condition at Discharge: Stable    Discharge Instructions/Follow-up:  PCP and GI 1 week. Code Status:  DNR-CCA     Activity: activity as tolerated    Diet: low na diet. No alcohol. Discharge Medications:     Discharge Medication List as of 6/29/2022 12:14 PM           Details   spironolactone (ALDACTONE) 25 MG tablet Take 1 tablet by mouth daily, Disp-30 tablet, R-3Normal      lactulose (CHRONULAC) 10 GM/15ML solution Take 15 mLs by mouth 2 times daily Adjust to 2-4BM daily. , Disp-1 each, R-2Normal              Details   torsemide (DEMADEX) 20 MG tablet Take 1 tablet by mouth daily, Disp-30 tablet, R-0Normal      midodrine (PROAMATINE) 10 MG tablet Take 1 tablet by mouth 3 times daily, Disp-90 tablet, R-1Normal              Details   Probiotic Product (PROBIOTIC DAILY PO) Take 1 capsule by mouth daily Over the counterHistorical Med      allopurinol (ZYLOPRIM) 300 MG tablet TAKE ONE TABLET BY MOUTH DAILY, Disp-90 tablet, R-1Normal      pantoprazole (PROTONIX) 40 MG tablet Take 40 mg by mouth in the morning and at bedtime Historical Med      apixaban (ELIQUIS) 5 MG TABS tablet Take by mouth 2 times dailyHistorical Med      nadolol (CORGARD) 20 MG tablet Take 20 mg by mouth dailyHistorical Med      potassium chloride (KLOR-CON M) 20 MEQ TBCR extended release tablet Take 20 mEq by mouth daily (with breakfast)Historical Med      budesonide-formoterol (SYMBICORT) 160-4.5 MCG/ACT AERO INHALE 1 PUFF BY MOUTH TWICE DAILY, Disp-3 each, R-3Please consider 90 day supplies to promote better adherenceNormal      albuterol sulfate  (90 Base) MCG/ACT inhaler INHALE 2 PUFFS BY MOUTH EVERY 6 HOURS AS NEEDED FOR WHEEZING, Disp-9 g, R-3Normal      dapagliflozin (FARXIGA) 5 MG tablet Take 5 mg by mouth every morningHistorical Med      ipratropium-albuterol (DUONEB) 0.5-2.5 (3) MG/3ML SOLN nebulizer solution Inhale 3 mLs into the lungs every 4 hours, Disp-360 mL, R-2Normal      Cholecalciferol (VITAMIN D3) 50 MCG (2000 UT) CAPS Take 1 capsule by mouth dailyHistorical Med      Coenzyme Q10 (COQ10) 100 MG CAPS Take 1 capsule by mouth dailyHistorical Med      MYRBETRIQ 50 MG TB24 Take 50 mg by mouth daily , DAWHistorical Med      vitamin B-12 (CYANOCOBALAMIN) 1000 MCG tablet Take 1 tablet by mouth daily, Disp-90 tablet, R-1NO PRINT             Time Spent on discharge is more than 30 minutes in the examination, evaluation, counseling and review of medications and discharge plan. Signed:    Juan Crouch MD   6/29/2022      Thank you Jimmy Jarvis MD for the opportunity to be involved in this patient's care. If you have any questions or concerns, please feel free to contact me at [phone].

## 2022-07-01 ENCOUNTER — TELEPHONE (OUTPATIENT)
Dept: FAMILY MEDICINE CLINIC | Age: 72
End: 2022-07-01

## 2022-07-01 NOTE — TELEPHONE ENCOUNTER
[facility] called pt discharged from hospital and is out of his morphine the hospital prescribed they are wanting us to refill it and I told them that we didn't prescribe it and was not sure there was much we could do.  I told them to contact the hospital.

## 2022-07-05 NOTE — TELEPHONE ENCOUNTER
If the patient is in hospice care, the hospice company should be prescribing any medication for pain control for the patient. This will not be prescribed by myself or Dr. Jose Marte.

## (undated) DEVICE — ENDO CARRY-ON PROCEDURE KIT INCLUDES SUCTION TUBING, LUBRICANT, GAUZE, BIOHAZARD STICKER, TRANSPORT PAD AND INTERCEPT BEDSIDE KIT.: Brand: ENDO CARRY-ON PROCEDURE KIT

## (undated) DEVICE — CANNULA,OXY,ADULT,SUPERSOFT,W/7'TUB,SC: Brand: MEDLINE INDUSTRIES, INC.

## (undated) DEVICE — TRAP POLYP ETRAP

## (undated) DEVICE — YANKAUER,BULB TIP,W/O VENT,RIGID,STERILE: Brand: MEDLINE

## (undated) DEVICE — ENDOSCOPIC KIT 2 12 FT OP4 DE2 GWN SYR

## (undated) DEVICE — SNARE ENDOSCP AD L240CM LOOP W10MM SHTH DIA2.4MM RND INSUL

## (undated) DEVICE — CONMED SCOPE SAVER BITE BLOCK, 20X27 MM: Brand: SCOPE SAVER

## (undated) DEVICE — ELECTRODE,RADIOTRANSLUCENT,FOAM,3PK: Brand: MEDLINE